# Patient Record
Sex: FEMALE | Race: WHITE | NOT HISPANIC OR LATINO | Employment: FULL TIME | URBAN - NONMETROPOLITAN AREA
[De-identification: names, ages, dates, MRNs, and addresses within clinical notes are randomized per-mention and may not be internally consistent; named-entity substitution may affect disease eponyms.]

---

## 2017-01-20 ENCOUNTER — OFFICE VISIT (OUTPATIENT)
Dept: INTERNAL MEDICINE | Facility: CLINIC | Age: 52
End: 2017-01-20

## 2017-01-20 VITALS
HEIGHT: 62 IN | BODY MASS INDEX: 31.13 KG/M2 | WEIGHT: 169.19 LBS | RESPIRATION RATE: 14 BRPM | OXYGEN SATURATION: 98 % | TEMPERATURE: 97.9 F | DIASTOLIC BLOOD PRESSURE: 70 MMHG | HEART RATE: 64 BPM | SYSTOLIC BLOOD PRESSURE: 110 MMHG

## 2017-01-20 DIAGNOSIS — E78.5 ELEVATED LIPIDS: Primary | ICD-10-CM

## 2017-01-20 PROCEDURE — 99213 OFFICE O/P EST LOW 20 MIN: CPT | Performed by: PHYSICIAN ASSISTANT

## 2017-01-20 NOTE — MR AVS SNAPSHOT
Maddi Betancur   2017 5:15 PM   Office Visit    Provider:  IVAN Muñoz   Department:  [facility] PRIMARY CARE   Dept Phone:  [phone]                Your Full Care Plan              Your Updated Medication List          This list is accurate as of: 17  5:52 PM.  Always use your most recent med list.                azelastine 0.05 % ophthalmic solution   Commonly known as:  OPTIVAR       BENADRYL 25 mg   Generic drug:  diphenhydrAMINE       buPROPion  MG 24 hr tablet   Commonly known as:  WELLBUTRIN XL   Take 1 tablet by mouth every morning.       EPIPEN 2-DONNA 0.3 MG/0.3ML solution auto-injector injection   Generic drug:  EPINEPHrine       FLONASE 50 MCG/ACT nasal spray   Generic drug:  fluticasone       PATANOL 0.1 % ophthalmic solution   Generic drug:  olopatadine       PROAIR  (90 BASE) MCG/ACT inhaler   Generic drug:  albuterol       QVAR 80 MCG/ACT inhaler   Generic drug:  beclomethasone       ZYRTEC ALLERGY 10 MG tablet   Generic drug:  cetirizine               You Were Diagnosed With        Codes Comments    Elevated lipids    -  Primary ICD-10-CM: E78.5  ICD-9-CM: 272.4       Instructions     None    Patient Instructions History      [employer] Signup     [facility] allows you to send messages to your doctor, view your test results, renew your prescriptions, schedule appointments, and more. To sign up, go to [employer] and click on the Sign Up Now link in the New User? box. Enter your [employer] Activation Code exactly as it appears below along with the last four digits of your Social Security Number and your Date of Birth () to complete the sign-up process. If you do not sign up before the expiration date, you must request a new code.    C[employer] Activation Code: [email or web address]  Expires: 2/3/2017  5:52 PM    If you have questions, you can email [email or web address] or call [phone] to talk  "to our MyChart staff. Remember, MyChart is NOT to be used for urgent needs. For medical emergencies, dial 911.               Other Info from Your Visit           Allergies     No Known Allergies      Reason for Visit     Med Refill followup, discuss cholesterol, labs?       Vital Signs     Blood Pressure Pulse Temperature Respirations Height Weight    110/70 64 97.9 °F (36.6 °C) 14 62.25\" (158.1 cm) 169 lb 3 oz (76.7 kg)    Oxygen Saturation Body Mass Index Smoking Status             98% 30.7 kg/m2 Former Smoker         Problems and Diagnoses Noted     Elevated lipids      "

## 2017-01-20 NOTE — PROGRESS NOTES
Maddi Betancur is a 51 y.o. female.     Subjective   History of Present Illness   Here today for follow up of hyperlipidemia. Has been dieting, exercising and losing weight in the last 6 months but recently donated blood and received a letter stating her total cholesterol was over 250. Last lipids in our office were elevated at 228 for total cholesterol. Has family history of hyperlipidemia in several relatives.         The following portions of the patient's history were reviewed and updated as appropriate: allergies, current medications, past family history, past medical history, past social history, past surgical history and problem list.    Review of Systems    Constitutional: Negative for appetite change, chills, fatigue, fever and unexpected weight change.   HENT: Negative for congestion, ear pain, hearing loss, nosebleeds, postnasal drip, rhinorrhea, sore throat, tinnitus and trouble swallowing.    Eyes: Negative for photophobia, discharge and visual disturbance.   Respiratory: Negative for cough, chest tightness, shortness of breath and wheezing.    Cardiovascular: Negative for chest pain, palpitations and leg swelling.   Gastrointestinal: Negative for abdominal distention, abdominal pain, blood in stool, constipation, diarrhea, nausea and vomiting.   Endocrine: Negative for cold intolerance, heat intolerance, polydipsia, polyphagia and polyuria.   Musculoskeletal: Negative for arthralgias, back pain, joint swelling, myalgias, neck pain and neck stiffness.   Skin: Negative for color change, pallor, rash and wound.   Allergic/Immunologic: Negative for environmental allergies, food allergies and immunocompromised state.   Neurological: Negative for dizziness, tremors, seizures, weakness, numbness and headaches.   Hematological: Negative for adenopathy. Does not bruise/bleed easily.   Psychiatric/Behavioral: Negative for agitation, behavioral problems, confusion, hallucinations, self-injury and suicidal  "ideas. The patient is not nervous/anxious.      Objective    Physical Exam  Constitutional: Oriented to person, place, and time. Appears well-developed and well-nourished.   HENT:   Head: Normocephalic and atraumatic.   Eyes: EOM are normal. Pupils are equal, round, and reactive to light.   Neck: Normal range of motion. Neck supple.   Cardiovascular: Normal rate, regular rhythm and normal heart sounds.    Pulmonary/Chest: Effort normal and breath sounds normal. No respiratory distress.  Has no wheezes or rales. Exhibits no chest wall tenderness.   Abdominal: Soft. Bowel sounds are normal. Exhibits no distension and no mass. There is no tenderness.   Musculoskeletal: Normal range of motion. Exhibits no tenderness.   Neurological: Alert and oriented to person, place, and time.   Skin: Skin is warm and dry.   Psychiatric: Has a normal mood and affect. Behavior is normal. Judgment and thought content normal.       Visit Vitals   • /70   • Pulse 64   • Temp 97.9 °F (36.6 °C)   • Resp 14   • Ht 62.25\" (158.1 cm)   • Wt 169 lb 3 oz (76.7 kg)   • SpO2 98%   • BMI 30.7 kg/m2       Nursing note and vitals reviewed.        Assessment/Plan   Maddi was seen today for med refill.    Diagnoses and all orders for this visit:    Elevated lipids  -     Lipid Panel; Future    Will evaluate after fasting lipids reviewed. Continue diet, exercise and weight loss.            "

## 2017-01-24 ENCOUNTER — LAB (OUTPATIENT)
Dept: INTERNAL MEDICINE | Facility: CLINIC | Age: 52
End: 2017-01-24

## 2017-01-24 DIAGNOSIS — E78.5 ELEVATED LIPIDS: ICD-10-CM

## 2017-01-24 LAB
ARTICHOKE IGE QN: 155 MG/DL (ref 0–130)
CHOLEST SERPL-MCNC: 238 MG/DL (ref 0–200)
HDLC SERPL-MCNC: 56 MG/DL (ref 40–60)
TRIGL SERPL-MCNC: 134 MG/DL (ref 0–150)

## 2017-01-24 PROCEDURE — 80061 LIPID PANEL: CPT | Performed by: PHYSICIAN ASSISTANT

## 2017-01-24 PROCEDURE — 36415 COLL VENOUS BLD VENIPUNCTURE: CPT | Performed by: INTERNAL MEDICINE

## 2017-01-26 ENCOUNTER — TELEPHONE (OUTPATIENT)
Dept: INTERNAL MEDICINE | Facility: CLINIC | Age: 52
End: 2017-01-26

## 2017-01-27 RX ORDER — ROSUVASTATIN CALCIUM 10 MG/1
10 TABLET, COATED ORAL NIGHTLY
Qty: 30 TABLET | Refills: 5 | Status: SHIPPED | OUTPATIENT
Start: 2017-01-27 | End: 2017-01-27 | Stop reason: SDUPTHER

## 2017-01-27 RX ORDER — ROSUVASTATIN CALCIUM 10 MG/1
10 TABLET, COATED ORAL NIGHTLY
Qty: 90 TABLET | Refills: 1 | Status: SHIPPED | OUTPATIENT
Start: 2017-01-27 | End: 2017-08-07 | Stop reason: SDUPTHER

## 2017-03-03 RX ORDER — BUPROPION HYDROCHLORIDE 300 MG/1
TABLET ORAL
Qty: 90 TABLET | Refills: 3 | Status: SHIPPED | OUTPATIENT
Start: 2017-03-03 | End: 2018-02-20 | Stop reason: SDUPTHER

## 2017-03-24 ENCOUNTER — TELEPHONE (OUTPATIENT)
Dept: INTERNAL MEDICINE | Facility: CLINIC | Age: 52
End: 2017-03-24

## 2017-03-24 NOTE — TELEPHONE ENCOUNTER
----- Message from Ivett Lozano sent at 3/24/2017 11:05 AM EDT -----  Contact: PAtient   Patient called the office stating that she was needing to leave more information about what she was wanting to discuss with Leonor. She said that it is regarding her cholesterol medication, She said that she thinks that's he is having side effects from it and would like a call back when you get a chance.

## 2017-03-28 NOTE — TELEPHONE ENCOUNTER
Pt said she was not sure if the reactions are from crestor or allergy shots, she gets hot spots on her skin in different places, they itch and will go away, last week she got a 3 inch x 6 inch bruise on leg, it also itched, went away in a wk, she also went to give blood and her total chol was 152, told her we would call her Thursday and if she wanted to d/c med to see if this stops she can or wait for our call, please call her back at [phone]

## 2017-03-30 NOTE — TELEPHONE ENCOUNTER
Cholesterol probably did not truly drop that much and would be more accurate to check a cholesterol panel in a few months. If she is more comfortable taking Crestor every other night that would be fine too.     The bruise was most likely from scratching at one of the skin reactions. Not a side effect of Crestor and not of any concern unless she notices frequent easy bruising.

## 2017-03-30 NOTE — TELEPHONE ENCOUNTER
I doubt that Crestor would be causing the skin issues and more likely would be the allergy shots but if she wants to discontinue the Crestor for 1-2 weeks to see then that would be fine.

## 2017-05-19 ENCOUNTER — OFFICE VISIT (OUTPATIENT)
Dept: INTERNAL MEDICINE | Facility: CLINIC | Age: 52
End: 2017-05-19

## 2017-05-19 VITALS
TEMPERATURE: 98.3 F | OXYGEN SATURATION: 99 % | HEART RATE: 82 BPM | SYSTOLIC BLOOD PRESSURE: 124 MMHG | WEIGHT: 164.2 LBS | BODY MASS INDEX: 30.22 KG/M2 | HEIGHT: 62 IN | DIASTOLIC BLOOD PRESSURE: 78 MMHG

## 2017-05-19 DIAGNOSIS — E78.00 PURE HYPERCHOLESTEROLEMIA: Primary | ICD-10-CM

## 2017-05-19 DIAGNOSIS — R10.9 RIGHT FLANK PAIN: Primary | ICD-10-CM

## 2017-05-19 LAB
BILIRUB BLD-MCNC: NEGATIVE MG/DL
CLARITY, POC: CLEAR
COLOR UR: YELLOW
GLUCOSE UR STRIP-MCNC: NEGATIVE MG/DL
KETONES UR QL: NEGATIVE
LEUKOCYTE EST, POC: NEGATIVE
NITRITE UR-MCNC: NEGATIVE MG/ML
PH UR: 6.5 [PH] (ref 5–8)
PROT UR STRIP-MCNC: NEGATIVE MG/DL
RBC # UR STRIP: NEGATIVE /UL
SP GR UR: 1 (ref 1–1.03)
UROBILINOGEN UR QL: NORMAL

## 2017-05-19 PROCEDURE — 81003 URINALYSIS AUTO W/O SCOPE: CPT | Performed by: PHYSICIAN ASSISTANT

## 2017-05-19 PROCEDURE — 99213 OFFICE O/P EST LOW 20 MIN: CPT | Performed by: PHYSICIAN ASSISTANT

## 2017-05-19 RX ORDER — IBUPROFEN 800 MG/1
800 TABLET ORAL EVERY 8 HOURS PRN
Qty: 60 TABLET | Refills: 0 | Status: SHIPPED | OUTPATIENT
Start: 2017-05-19 | End: 2017-10-12

## 2017-05-19 RX ORDER — CYCLOBENZAPRINE HCL 5 MG
5 TABLET ORAL 3 TIMES DAILY PRN
Qty: 15 TABLET | Refills: 0 | Status: SHIPPED | OUTPATIENT
Start: 2017-05-19 | End: 2017-10-12

## 2017-05-22 ENCOUNTER — TELEPHONE (OUTPATIENT)
Dept: INTERNAL MEDICINE | Facility: CLINIC | Age: 52
End: 2017-05-22

## 2017-07-03 ENCOUNTER — RESULTS ENCOUNTER (OUTPATIENT)
Dept: INTERNAL MEDICINE | Facility: CLINIC | Age: 52
End: 2017-07-03

## 2017-07-03 DIAGNOSIS — E78.00 PURE HYPERCHOLESTEROLEMIA: ICD-10-CM

## 2017-07-25 LAB
CHOLEST SERPL-MCNC: 122 MG/DL (ref 0–199)
HDLC SERPL-MCNC: 50 MG/DL (ref 40–60)
LDLC SERPL CALC-MCNC: 58 MG/DL (ref 0–99)
TRIGL SERPL-MCNC: 69 MG/DL
VLDLC SERPL CALC-MCNC: 13.8 MG/DL

## 2017-08-07 RX ORDER — ROSUVASTATIN CALCIUM 10 MG/1
TABLET, COATED ORAL
Qty: 90 TABLET | Refills: 1 | Status: SHIPPED | OUTPATIENT
Start: 2017-08-07 | End: 2018-02-03 | Stop reason: SDUPTHER

## 2017-10-12 ENCOUNTER — OFFICE VISIT (OUTPATIENT)
Dept: OBSTETRICS AND GYNECOLOGY | Facility: CLINIC | Age: 52
End: 2017-10-12

## 2017-10-12 VITALS
DIASTOLIC BLOOD PRESSURE: 67 MMHG | BODY MASS INDEX: 28.34 KG/M2 | SYSTOLIC BLOOD PRESSURE: 116 MMHG | WEIGHT: 154 LBS | HEIGHT: 62 IN

## 2017-10-12 DIAGNOSIS — N89.8 VAGINAL IRRITATION: Primary | ICD-10-CM

## 2017-10-12 DIAGNOSIS — N95.2 ATROPHIC VAGINITIS: ICD-10-CM

## 2017-10-12 PROCEDURE — 99214 OFFICE O/P EST MOD 30 MIN: CPT | Performed by: PHYSICIAN ASSISTANT

## 2017-10-12 RX ORDER — INFLUENZA VIRUS VACCINE 15; 15; 15; 15 UG/.5ML; UG/.5ML; UG/.5ML; UG/.5ML
SUSPENSION INTRAMUSCULAR
Refills: 0 | COMMUNITY
Start: 2017-10-02 | End: 2017-10-12

## 2017-10-12 NOTE — PROGRESS NOTES
Subjective   Chief Complaint   Patient presents with   • Vaginitis       Maddi Betancur is a 52 y.o. year old  presenting to be seen for complaints of vaginal itching, burning,  and irritation that has been off and on for 6 months now  She has assumed these were yeast infections and has been treated for such-has had diflucan and monistat cream 4 times in past 6 months with persistent symptoms  Has vaginal dyspareunia  She had hysterectomy several years ago-she has ovaries  Has been several years since last pap        Past Medical History:   Diagnosis Date   • Acid reflux    • Anemia    • Asthma    • BMI 34.0-34.9,adult    • Cancer     Cervical   • Depression         Current Outpatient Prescriptions:   •  albuterol (PROAIR HFA) 108 (90 BASE) MCG/ACT inhaler, ProAir  (90 Base) MCG/ACT Inhalation Aerosol Solution; Patient Sig: ProAir  (90 Base) MCG/ACT Inhalation Aerosol Solution INHALE 1 TO 2 PUFFS EVERY 4 TO 6 HOURS AS NEEDED.; 1; 2; -May-2013; Active, Disp: , Rfl:   •  azelastine (OPTIVAR) 0.05 % ophthalmic solution, Apply  to eye every 12 (twelve) hours., Disp: , Rfl:   •  beclomethasone (QVAR) 80 MCG/ACT inhaler, Qvar 80 MCG/ACT Inhalation Aerosol Solution; Patient Sig: Qvar 80 MCG/ACT Inhalation Aerosol Solution ; 0; -2014; Active, Disp: , Rfl:   •  buPROPion XL (WELLBUTRIN XL) 300 MG 24 hr tablet, TAKE 1 TABLET EVERY MORNING **PAR MFG **, Disp: 90 tablet, Rfl: 3  •  cetirizine (ZYRTEC ALLERGY) 10 MG tablet, Take  by mouth., Disp: , Rfl:   •  diphenhydrAMINE (BENADRYL) 25 mg, Take 25 mg by mouth Every Night., Disp: , Rfl:   •  EPIPEN 2-DONNA 0.3 MG/0.3ML solution auto-injector injection, use as directed for ALLERGIC REACTION, Disp: , Rfl: 0  •  rosuvastatin (CRESTOR) 10 MG tablet, TAKE 1 TABLET NIGHTLY, Disp: 90 tablet, Rfl: 1  •  conjugated estrogens (PREMARIN) 0.625 MG/GM vaginal cream, Use 0.5  grams intravaginally 2 times per week to control symptoms., Disp: 30 g, Rfl: 0  •   "conjugated estrogens (PREMARIN) 0.625 MG/GM vaginal cream, Use 0.5  grams intravaginally 2 times per week to control symptoms., Disp: 30 g, Rfl: 3   No Known Allergies   Past Surgical History:   Procedure Laterality Date   • DIAGNOSTIC LAPAROSCOPY     • EXCISION URETHRAL DIVERTICULUM FEMALE     • GANGLION CYST EXCISION     • HYSTERECTOMY     • PARTIAL HYSTERECTOMY        Social History     Social History   • Marital status:      Spouse name: N/A   • Number of children: N/A   • Years of education: N/A     Occupational History   • Not on file.     Social History Main Topics   • Smoking status: Former Smoker   • Smokeless tobacco: Never Used   • Alcohol use No   • Drug use: No   • Sexual activity: Yes     Birth control/ protection: Surgical      Comment: Hysterectomy     Other Topics Concern   • Not on file     Social History Narrative      Family History   Problem Relation Age of Onset   • Lung cancer Mother    • Cervical cancer Mother    • Lymphoma Mother    • Hyperlipidemia Mother    • Hypertension Mother    • Breast cancer Maternal Aunt        Review of Systems   Constitutional: Negative.    Gastrointestinal: Negative.    Genitourinary: Positive for dyspareunia and vaginal pain. Negative for pelvic pain and vaginal discharge.   Musculoskeletal: Positive for arthralgias.   All other systems reviewed and are negative.          Objective   /67  Ht 62\" (157.5 cm)  Wt 154 lb (69.9 kg)  BMI 28.17 kg/m2    Physical Exam   Constitutional: She appears well-developed and well-nourished. She is cooperative.   Eyes: Conjunctivae and lids are normal.   Neck: No thyroid mass and no thyromegaly present.   Cardiovascular: Normal rate, regular rhythm and normal heart sounds.    Pulmonary/Chest: Effort normal and breath sounds normal.   Abdominal: Soft. Normal appearance. There is no hepatosplenomegaly. There is no tenderness.   Genitourinary: There is no tenderness or lesion on the right labia. There is no " tenderness or lesion on the left labia. Right adnexum displays no mass and no tenderness. Left adnexum displays no mass and no tenderness. No erythema or tenderness in the vagina. No vaginal discharge found.   Genitourinary Comments: Tissue atrophic  Small amt white exudate appears residual monistat cream   Neurological: She is alert.   Skin: Skin is warm, dry and intact.   Psychiatric: She has a normal mood and affect. Her behavior is normal.            Assessment and Plan  Maddi was seen today for vaginitis.    Diagnoses and all orders for this visit:    Vaginal irritation  -     Candida panel, PCR - Swab, Vagina    Atrophic vaginitis    Other orders  -     conjugated estrogens (PREMARIN) 0.625 MG/GM vaginal cream; Use 0.5  grams intravaginally 2 times per week to control symptoms.  -     conjugated estrogens (PREMARIN) 0.625 MG/GM vaginal cream; Use 0.5  grams intravaginally 2 times per week to control symptoms.      Patient Instructions   Suspect symptoms from atrophy  Vaginal culture done for yeast and will call with results             This note was electronically signed.    Ilsa Campo PA-C   October 12, 2017

## 2017-10-19 LAB
C ALBICANS DNA VAG QL NAA+PROBE: NEGATIVE
C GLABRATA DNA VAG QL NAA+PROBE: NEGATIVE
C KRUSEI DNA VAG QL NAA+PROBE: NEGATIVE
C LUSITANIAE DNA VAG QL NAA+PROBE: NEGATIVE
C PARAP DNA VAG QL NAA+PROBE: NEGATIVE
C TROPICLS DNA VAG QL NAA+PROBE: NEGATIVE

## 2017-12-15 ENCOUNTER — OFFICE VISIT (OUTPATIENT)
Dept: INTERNAL MEDICINE | Facility: CLINIC | Age: 52
End: 2017-12-15

## 2017-12-15 VITALS
TEMPERATURE: 98.4 F | HEIGHT: 62 IN | SYSTOLIC BLOOD PRESSURE: 128 MMHG | DIASTOLIC BLOOD PRESSURE: 72 MMHG | BODY MASS INDEX: 27.79 KG/M2 | WEIGHT: 151 LBS | HEART RATE: 60 BPM

## 2017-12-15 DIAGNOSIS — E78.2 MIXED HYPERLIPIDEMIA: Primary | ICD-10-CM

## 2017-12-15 LAB
ALBUMIN SERPL-MCNC: 4.5 G/DL (ref 3.5–5)
ALBUMIN/GLOB SERPL: 1.8 G/DL (ref 1–2)
ALP SERPL-CCNC: 86 U/L (ref 38–126)
ALT SERPL-CCNC: 43 U/L (ref 13–69)
AST SERPL-CCNC: 47 U/L (ref 15–46)
BILIRUB SERPL-MCNC: 0.6 MG/DL (ref 0.2–1.3)
BUN SERPL-MCNC: 10 MG/DL (ref 7–20)
BUN/CREAT SERPL: 14.3 (ref 7.1–23.5)
CALCIUM SERPL-MCNC: 9.9 MG/DL (ref 8.4–10.2)
CHLORIDE SERPL-SCNC: 105 MMOL/L (ref 98–107)
CHOLEST SERPL-MCNC: 129 MG/DL (ref 0–199)
CO2 SERPL-SCNC: 25 MMOL/L (ref 26–30)
CREAT SERPL-MCNC: 0.7 MG/DL (ref 0.6–1.3)
GFR SERPLBLD CREATININE-BSD FMLA CKD-EPI: 106 ML/MIN/1.73
GFR SERPLBLD CREATININE-BSD FMLA CKD-EPI: 88 ML/MIN/1.73
GLOBULIN SER CALC-MCNC: 2.5 GM/DL
GLUCOSE SERPL-MCNC: 77 MG/DL (ref 74–98)
HDLC SERPL-MCNC: 61 MG/DL (ref 40–60)
LDLC SERPL CALC-MCNC: 56 MG/DL (ref 0–99)
POTASSIUM SERPL-SCNC: 4.2 MMOL/L (ref 3.5–5.1)
PROT SERPL-MCNC: 7 G/DL (ref 6.3–8.2)
SODIUM SERPL-SCNC: 142 MMOL/L (ref 137–145)
TRIGL SERPL-MCNC: 62 MG/DL
VLDLC SERPL CALC-MCNC: 12.4 MG/DL

## 2017-12-15 PROCEDURE — 99213 OFFICE O/P EST LOW 20 MIN: CPT | Performed by: PHYSICIAN ASSISTANT

## 2017-12-15 NOTE — PROGRESS NOTES
Maddi Betancur is a 52 y.o. female.     Subjective   History of Present Illness   Here today with concern of elevated cholesterol when checked while donating blood. She has been taking Crestor regularly as directed and wants to ensure it is working well. She follows a strict diet low in cholesterol and exercises most days of the week.       The following portions of the patient's history were reviewed and updated as appropriate: allergies, current medications, past family history, past medical history, past social history, past surgical history and problem list.    Review of Systems    Constitutional: Negative for appetite change, chills, fatigue, fever and unexpected weight change.   HENT: Negative for congestion, ear pain, hearing loss, nosebleeds, postnasal drip, rhinorrhea, sore throat, tinnitus and trouble swallowing.    Eyes: Negative for photophobia, discharge and visual disturbance.   Respiratory: Negative for cough, chest tightness, shortness of breath and wheezing.    Cardiovascular: Negative for chest pain, palpitations and leg swelling.   Gastrointestinal: Negative for abdominal distention, abdominal pain, blood in stool, constipation, diarrhea, nausea and vomiting.   Endocrine: Negative for cold intolerance, heat intolerance, polydipsia, polyphagia and polyuria.   Musculoskeletal: Negative for arthralgias, back pain, joint swelling, myalgias, neck pain and neck stiffness.   Skin: Negative for color change, pallor, rash and wound.   Allergic/Immunologic: Negative for environmental allergies, food allergies and immunocompromised state.   Neurological: Negative for dizziness, tremors, seizures, weakness, numbness and headaches.   Hematological: Negative for adenopathy. Does not bruise/bleed easily.   Psychiatric/Behavioral: Negative for sleep disturbances, agitation, behavioral problems, confusion, hallucinations, self-injury and suicidal ideas. The patient is not nervous/anxious.   "    Objective    Physical Exam  Constitutional: Oriented to person, place, and time. Appears well-developed and well-nourished.   HENT:   Head: Normocephalic and atraumatic.   Eyes: EOM are normal. Pupils are equal, round, and reactive to light.   Neck: Normal range of motion. Neck supple.   Cardiovascular: Normal rate, regular rhythm and normal heart sounds.    Pulmonary/Chest: Effort normal and breath sounds normal. No respiratory distress.  Has no wheezes or rales. Exhibits no chest wall tenderness.   Abdominal: Soft. Bowel sounds are normal. Exhibits no distension and no mass. There is no tenderness.   Musculoskeletal: Normal range of motion. Exhibits no tenderness.   Neurological: Alert and oriented to person, place, and time.   Skin: Skin is warm and dry.   Psychiatric: Has a normal mood and affect. Behavior is normal. Judgment and thought content normal.       /72  Pulse 60  Temp 98.4 °F (36.9 °C)  Ht 157.5 cm (62.01\")  Wt 68.5 kg (151 lb)  BMI 27.61 kg/m2    Nursing note and vitals reviewed.        Assessment/Plan   Maddi was seen today for follow-up and hyperlipidemia.    Diagnoses and all orders for this visit:    Mixed hyperlipidemia  -     Lipid Panel  -     Comprehensive Metabolic Panel        PAP scheduled for next month. She declined order for mammogram.   She declined an order for colonoscopy today but will consider in the future.        "

## 2018-01-17 ENCOUNTER — OFFICE VISIT (OUTPATIENT)
Dept: OBSTETRICS AND GYNECOLOGY | Facility: CLINIC | Age: 53
End: 2018-01-17

## 2018-01-17 VITALS
SYSTOLIC BLOOD PRESSURE: 122 MMHG | DIASTOLIC BLOOD PRESSURE: 70 MMHG | HEIGHT: 62 IN | WEIGHT: 152 LBS | BODY MASS INDEX: 27.97 KG/M2

## 2018-01-17 DIAGNOSIS — Z12.72 SPECIAL SCREENING FOR MALIGNANT NEOPLASM OF VAGINA: ICD-10-CM

## 2018-01-17 DIAGNOSIS — Z01.419 ENCOUNTER FOR GYNECOLOGICAL EXAMINATION WITHOUT ABNORMAL FINDING: Primary | ICD-10-CM

## 2018-01-17 PROCEDURE — 99396 PREV VISIT EST AGE 40-64: CPT | Performed by: PHYSICIAN ASSISTANT

## 2018-01-17 NOTE — PROGRESS NOTES
Subjective   Chief Complaint   Patient presents with   • Gynecologic Exam     No complaints       Maddi Betancur is a 52 y.o. year old  presenting to be seen for her annual gynecological exam.   She has no complaints or concerns  Using vaginal estrogen cream about once q 2 weeks and working OK this way-does not need refills  Patient has never had mammogram and refuses mammogram- she does do SBE monthly  Has had hysterectomy-has ovaries    Past Medical History:   Diagnosis Date   • Acid reflux    • Anemia    • Asthma    • BMI 34.0-34.9,adult    • Cancer     Cervical   • Depression         Current Outpatient Prescriptions:   •  albuterol (PROAIR HFA) 108 (90 BASE) MCG/ACT inhaler, ProAir  (90 Base) MCG/ACT Inhalation Aerosol Solution; Patient Sig: ProAir  (90 Base) MCG/ACT Inhalation Aerosol Solution INHALE 1 TO 2 PUFFS EVERY 4 TO 6 HOURS AS NEEDED.; 1; 2; 22-May-2013; Active, Disp: , Rfl:   •  azelastine (OPTIVAR) 0.05 % ophthalmic solution, Apply  to eye every 12 (twelve) hours., Disp: , Rfl:   •  beclomethasone (QVAR) 80 MCG/ACT inhaler, Qvar 80 MCG/ACT Inhalation Aerosol Solution; Patient Sig: Qvar 80 MCG/ACT Inhalation Aerosol Solution ; 0; -2014; Active, Disp: , Rfl:   •  buPROPion XL (WELLBUTRIN XL) 300 MG 24 hr tablet, TAKE 1 TABLET EVERY MORNING **PAR MFG **, Disp: 90 tablet, Rfl: 3  •  cetirizine (ZYRTEC ALLERGY) 10 MG tablet, Take  by mouth., Disp: , Rfl:   •  conjugated estrogens (PREMARIN) 0.625 MG/GM vaginal cream, Use 0.5  grams intravaginally 2 times per week to control symptoms., Disp: 30 g, Rfl: 0  •  diphenhydrAMINE (BENADRYL) 25 mg, Take 25 mg by mouth Every Night., Disp: , Rfl:   •  EPIPEN 2-DONNA 0.3 MG/0.3ML solution auto-injector injection, use as directed for ALLERGIC REACTION, Disp: , Rfl: 0  •  rosuvastatin (CRESTOR) 10 MG tablet, TAKE 1 TABLET NIGHTLY, Disp: 90 tablet, Rfl: 1   No Known Allergies   Past Surgical History:   Procedure Laterality Date   • DIAGNOSTIC  "LAPAROSCOPY     • EXCISION URETHRAL DIVERTICULUM FEMALE     • GANGLION CYST EXCISION     • HYSTERECTOMY     • PARTIAL HYSTERECTOMY        Social History     Social History   • Marital status:      Spouse name: N/A   • Number of children: N/A   • Years of education: N/A     Occupational History   • Not on file.     Social History Main Topics   • Smoking status: Former Smoker   • Smokeless tobacco: Never Used   • Alcohol use No   • Drug use: No   • Sexual activity: Yes     Birth control/ protection: Surgical      Comment: Hysterectomy     Other Topics Concern   • Not on file     Social History Narrative      Family History   Problem Relation Age of Onset   • Lung cancer Mother    • Cervical cancer Mother    • Lymphoma Mother    • Hyperlipidemia Mother    • Hypertension Mother    • Breast cancer Maternal Aunt        Review of Systems   Constitutional: Negative.    Gastrointestinal: Negative.    Genitourinary: Negative.            Objective   /70  Ht 157.5 cm (62\")  Wt 68.9 kg (152 lb)  Breastfeeding? No  BMI 27.8 kg/m2    Physical Exam   Constitutional: She appears well-developed and well-nourished. She is cooperative.   Pulmonary/Chest: Right breast exhibits no inverted nipple, no mass, no nipple discharge, no skin change and no tenderness. Left breast exhibits no inverted nipple, no mass, no nipple discharge, no skin change and no tenderness.   Abdominal: Soft. Normal appearance. There is no hepatosplenomegaly. There is no tenderness.   Genitourinary: Vagina normal. There is no tenderness or lesion on the right labia. There is no tenderness or lesion on the left labia. Right adnexum displays no mass and no tenderness. Left adnexum displays no mass and no tenderness.   Neurological: She is alert.   Skin: Skin is warm and dry.   Psychiatric: She has a normal mood and affect. Her behavior is normal.            Assessment and Plan  Maddi was seen today for gynecologic exam.    Diagnoses and all orders " for this visit:    Encounter for gynecological examination without abnormal finding    Special screening for malignant neoplasm of vagina  -     Liquid-based Pap Smear, Screening - ThinPrep Vial, Cervix; Future    Patient Instructions   Self breast exam monthly  Annual mammogram  Calcium 1200 mg daily and vit D 2000 mg daily  Regular excercise             This note was electronically signed.    Ilsa Campo PA-C   January 17, 2018

## 2018-01-25 DIAGNOSIS — Z12.72 SPECIAL SCREENING FOR MALIGNANT NEOPLASM OF VAGINA: ICD-10-CM

## 2018-02-05 RX ORDER — ROSUVASTATIN CALCIUM 10 MG/1
TABLET, COATED ORAL
Qty: 90 TABLET | Refills: 1 | Status: SHIPPED | OUTPATIENT
Start: 2018-02-05 | End: 2018-08-02 | Stop reason: SDUPTHER

## 2018-02-20 RX ORDER — BUPROPION HYDROCHLORIDE 300 MG/1
TABLET ORAL
Qty: 90 TABLET | Refills: 3 | Status: SHIPPED | OUTPATIENT
Start: 2018-02-20 | End: 2019-02-14 | Stop reason: SDUPTHER

## 2018-03-15 ENCOUNTER — TRANSCRIBE ORDERS (OUTPATIENT)
Dept: ADMINISTRATIVE | Facility: HOSPITAL | Age: 53
End: 2018-03-15

## 2018-03-15 DIAGNOSIS — M25.571 CHRONIC PAIN OF RIGHT ANKLE: Primary | ICD-10-CM

## 2018-03-15 DIAGNOSIS — G89.29 CHRONIC PAIN OF RIGHT ANKLE: Primary | ICD-10-CM

## 2018-03-20 ENCOUNTER — HOSPITAL ENCOUNTER (OUTPATIENT)
Dept: ULTRASOUND IMAGING | Facility: HOSPITAL | Age: 53
Discharge: HOME | End: 2018-03-20
Attending: ORTHOPAEDIC SURGERY | Admitting: ORTHOPAEDIC SURGERY

## 2018-03-20 DIAGNOSIS — G89.29 CHRONIC PAIN OF RIGHT ANKLE: ICD-10-CM

## 2018-03-20 DIAGNOSIS — M25.571 CHRONIC PAIN OF RIGHT ANKLE: ICD-10-CM

## 2018-03-20 PROCEDURE — 93971 EXTREMITY STUDY: CPT

## 2018-08-02 RX ORDER — ROSUVASTATIN CALCIUM 10 MG/1
TABLET, COATED ORAL
Qty: 90 TABLET | Refills: 1 | Status: SHIPPED | OUTPATIENT
Start: 2018-08-02 | End: 2019-01-20 | Stop reason: SDUPTHER

## 2018-10-29 ENCOUNTER — TELEPHONE (OUTPATIENT)
Dept: INTERNAL MEDICINE | Facility: CLINIC | Age: 53
End: 2018-10-29

## 2018-11-01 ENCOUNTER — OFFICE VISIT (OUTPATIENT)
Dept: INTERNAL MEDICINE | Facility: CLINIC | Age: 53
End: 2018-11-01

## 2018-11-01 VITALS
TEMPERATURE: 98.8 F | BODY MASS INDEX: 27.79 KG/M2 | HEART RATE: 70 BPM | WEIGHT: 151 LBS | HEIGHT: 62 IN | SYSTOLIC BLOOD PRESSURE: 112 MMHG | OXYGEN SATURATION: 100 % | DIASTOLIC BLOOD PRESSURE: 68 MMHG

## 2018-11-01 DIAGNOSIS — E78.2 MIXED HYPERLIPIDEMIA: ICD-10-CM

## 2018-11-01 DIAGNOSIS — Z23 NEED FOR INFLUENZA VACCINATION: ICD-10-CM

## 2018-11-01 DIAGNOSIS — M21.612 BUNION OF GREAT TOE OF LEFT FOOT: ICD-10-CM

## 2018-11-01 DIAGNOSIS — Z01.818 PREOP EXAMINATION: Primary | ICD-10-CM

## 2018-11-01 LAB
ALBUMIN SERPL-MCNC: 4.4 G/DL (ref 3.5–5)
ALBUMIN/GLOB SERPL: 1.8 G/DL (ref 1–2)
ALP SERPL-CCNC: 73 U/L (ref 38–126)
ALT SERPL-CCNC: 30 U/L (ref 13–69)
AST SERPL-CCNC: 26 U/L (ref 15–46)
BASOPHILS # BLD AUTO: 0.04 10*3/MM3 (ref 0–0.2)
BASOPHILS NFR BLD AUTO: 0.8 % (ref 0–2.5)
BILIRUB SERPL-MCNC: 0.5 MG/DL (ref 0.2–1.3)
BUN SERPL-MCNC: 9 MG/DL (ref 7–20)
BUN/CREAT SERPL: 12.9 (ref 7.1–23.5)
CALCIUM SERPL-MCNC: 9.6 MG/DL (ref 8.4–10.2)
CHLORIDE SERPL-SCNC: 103 MMOL/L (ref 98–107)
CHOLEST SERPL-MCNC: 132 MG/DL (ref 0–199)
CO2 SERPL-SCNC: 27 MMOL/L (ref 26–30)
CREAT SERPL-MCNC: 0.7 MG/DL (ref 0.6–1.3)
EOSINOPHIL # BLD AUTO: 0.1 10*3/MM3 (ref 0–0.7)
EOSINOPHIL NFR BLD AUTO: 1.9 % (ref 0–7)
ERYTHROCYTE [DISTWIDTH] IN BLOOD BY AUTOMATED COUNT: 14.8 % (ref 11.5–14.5)
GLOBULIN SER CALC-MCNC: 2.5 GM/DL
GLUCOSE SERPL-MCNC: 83 MG/DL (ref 74–98)
HCT VFR BLD AUTO: 34.5 % (ref 37–47)
HDLC SERPL-MCNC: 69 MG/DL (ref 40–60)
HGB BLD-MCNC: 10.8 G/DL (ref 12–16)
IMM GRANULOCYTES # BLD: 0.01 10*3/MM3 (ref 0–0.06)
IMM GRANULOCYTES NFR BLD: 0.2 % (ref 0–0.6)
LDLC SERPL CALC-MCNC: 49 MG/DL (ref 0–99)
LYMPHOCYTES # BLD AUTO: 1.63 10*3/MM3 (ref 0.6–3.4)
LYMPHOCYTES NFR BLD AUTO: 31.2 % (ref 10–50)
MCH RBC QN AUTO: 26.3 PG (ref 27–31)
MCHC RBC AUTO-ENTMCNC: 31.3 G/DL (ref 30–37)
MCV RBC AUTO: 83.9 FL (ref 81–99)
MONOCYTES # BLD AUTO: 0.42 10*3/MM3 (ref 0–0.9)
MONOCYTES NFR BLD AUTO: 8 % (ref 0–12)
NEUTROPHILS # BLD AUTO: 3.03 10*3/MM3 (ref 2–6.9)
NEUTROPHILS NFR BLD AUTO: 57.9 % (ref 37–80)
NRBC BLD AUTO-RTO: 0 /100 WBC (ref 0–0)
PLATELET # BLD AUTO: 196 10*3/MM3 (ref 130–400)
POTASSIUM SERPL-SCNC: 4.7 MMOL/L (ref 3.5–5.1)
PROT SERPL-MCNC: 6.9 G/DL (ref 6.3–8.2)
RBC # BLD AUTO: 4.11 10*6/MM3 (ref 4.2–5.4)
SODIUM SERPL-SCNC: 138 MMOL/L (ref 137–145)
TRIGL SERPL-MCNC: 71 MG/DL
VLDLC SERPL CALC-MCNC: 14.2 MG/DL
WBC # BLD AUTO: 5.23 10*3/MM3 (ref 4.8–10.8)

## 2018-11-01 PROCEDURE — 90674 CCIIV4 VAC NO PRSV 0.5 ML IM: CPT | Performed by: PHYSICIAN ASSISTANT

## 2018-11-01 PROCEDURE — 90471 IMMUNIZATION ADMIN: CPT | Performed by: PHYSICIAN ASSISTANT

## 2018-11-01 PROCEDURE — 99214 OFFICE O/P EST MOD 30 MIN: CPT | Performed by: PHYSICIAN ASSISTANT

## 2019-01-21 RX ORDER — ROSUVASTATIN CALCIUM 10 MG/1
TABLET, COATED ORAL
Qty: 90 TABLET | Refills: 1 | Status: SHIPPED | OUTPATIENT
Start: 2019-01-21 | End: 2019-07-18 | Stop reason: SDUPTHER

## 2019-02-14 RX ORDER — BUPROPION HYDROCHLORIDE 300 MG/1
TABLET ORAL
Qty: 90 TABLET | Refills: 3 | Status: SHIPPED | OUTPATIENT
Start: 2019-02-14 | End: 2020-01-30 | Stop reason: SDUPTHER

## 2019-05-07 ENCOUNTER — OFFICE VISIT (OUTPATIENT)
Dept: INTERNAL MEDICINE | Facility: CLINIC | Age: 54
End: 2019-05-07

## 2019-05-07 ENCOUNTER — HOSPITAL ENCOUNTER (OUTPATIENT)
Dept: GENERAL RADIOLOGY | Facility: HOSPITAL | Age: 54
Discharge: HOME | End: 2019-05-07
Admitting: PHYSICIAN ASSISTANT

## 2019-05-07 VITALS
DIASTOLIC BLOOD PRESSURE: 62 MMHG | SYSTOLIC BLOOD PRESSURE: 118 MMHG | OXYGEN SATURATION: 99 % | HEART RATE: 70 BPM | TEMPERATURE: 99.2 F

## 2019-05-07 DIAGNOSIS — Q76.0 SPINA BIFIDA OCCULTA: ICD-10-CM

## 2019-05-07 DIAGNOSIS — M53.87 SCIATICA ASSOCIATED WITH DISORDER OF LUMBOSACRAL SPINE: ICD-10-CM

## 2019-05-07 DIAGNOSIS — M54.50 ACUTE EXACERBATION OF CHRONIC LOW BACK PAIN: Primary | ICD-10-CM

## 2019-05-07 DIAGNOSIS — G89.29 ACUTE EXACERBATION OF CHRONIC LOW BACK PAIN: Primary | ICD-10-CM

## 2019-05-07 PROCEDURE — 99213 OFFICE O/P EST LOW 20 MIN: CPT | Performed by: PHYSICIAN ASSISTANT

## 2019-05-07 PROCEDURE — 72110 X-RAY EXAM L-2 SPINE 4/>VWS: CPT

## 2019-05-07 RX ORDER — CYCLOBENZAPRINE HCL 10 MG
10 TABLET ORAL NIGHTLY PRN
Qty: 30 TABLET | Refills: 0 | Status: SHIPPED | OUTPATIENT
Start: 2019-05-07 | End: 2020-01-30

## 2019-05-07 NOTE — PROGRESS NOTES
Maddi Betancur is a 53 y.o. female.     Subjective   History of Present Illness   Here today with complaint of acute on chronic right sided sciatica for the last 4 days which has seemed to worsen each day since onset. She typically only experiences symptoms for up to 1 day at most but this time it has been persistent for 4 days.  No known injury.  Pain is worsened by laying or sitting and improves some with walking. She has tried Advil and Tylenol which have not helped.  She has been using a heating pad which helps a little very short-term.  She has been unable to sleep due to pain. No loss of bowel or bladder control. She does have chronic low-back pain which is no worse than normal.  She is known to have spina bifida occulta which was an incidental finding on imaging around 20 years ago and she has had no imaging studies since then.           The following portions of the patient's history were reviewed and updated as appropriate: allergies, current medications, past family history, past medical history, past social history, past surgical history and problem list.    Review of Systems   Constitutional: Negative for appetite change, chills, fatigue, fever and unexpected weight change.   Respiratory: Negative for cough, chest tightness, shortness of breath and wheezing.    Cardiovascular: Negative for chest pain, palpitations and leg swelling.   Gastrointestinal: Negative for abdominal pain.   Genitourinary: Negative.  Negative for decreased urine volume, difficulty urinating, dysuria, frequency and urgency.   Musculoskeletal: Positive for arthralgias, back pain and gait problem. Negative for joint swelling, neck pain and neck stiffness.   Skin: Negative.    Neurological: Negative for dizziness, seizures, syncope, facial asymmetry, weakness, numbness and headaches.   Psychiatric/Behavioral: Positive for sleep disturbance. Negative for behavioral problems, confusion, dysphoric mood, self-injury and suicidal ideas.  The patient is not nervous/anxious.          Objective    Physical Exam   Constitutional: She is oriented to person, place, and time. She appears well-developed and well-nourished. No distress.   HENT:   Head: Normocephalic and atraumatic.   Eyes: Conjunctivae and EOM are normal. Pupils are equal, round, and reactive to light.   Neck: Normal range of motion. Neck supple.   Cardiovascular: Normal rate, regular rhythm and normal heart sounds. Exam reveals no gallop and no friction rub.   No murmur heard.  Pulmonary/Chest: Effort normal and breath sounds normal. No stridor. No respiratory distress. She has no wheezes. She has no rales. She exhibits no tenderness.   Abdominal: Soft. Bowel sounds are normal. She exhibits no mass. There is no tenderness. No hernia.   Musculoskeletal: She exhibits tenderness ( L3-S1 vertebral tenderness with bilateral SI joint tenderness (right greater than left) and right gluteal tenderness.). She exhibits no edema or deformity.   Range of motion slowed due to pain.  Unable to assess straight leg raise due to pain.   Lymphadenopathy:     She has no cervical adenopathy.   Neurological: She is alert and oriented to person, place, and time. She displays normal reflexes. No cranial nerve deficit or sensory deficit. She exhibits normal muscle tone. Coordination normal.   Skin: Skin is warm and dry. Capillary refill takes less than 2 seconds. No rash noted. She is not diaphoretic.   Psychiatric: She has a normal mood and affect. Her behavior is normal. Judgment and thought content normal.   Nursing note and vitals reviewed.        /62   Pulse 70   Temp 99.2 °F (37.3 °C)   SpO2 99%     Nursing note and vitals reviewed.          Assessment/Plan   Maddi was seen today for abstract.    Diagnoses and all orders for this visit:    Acute exacerbation of chronic low back pain  -     XR Spine Lumbar 4+ View  -     cyclobenzaprine (FLEXERIL) 10 MG tablet; Take 1 tablet by mouth At Night As  Needed for Muscle Spasms.    Sciatica associated with disorder of lumbosacral spine  -     XR Spine Lumbar 4+ View    Spina bifida occulta  -     XR Spine Lumbar 4+ View      Provided with instructions regarding low back exercises and stretches.  Encouraged her to continue heat as needed followed by stretching.  May use Flexeril at bedtime as needed due to pain causing sleep disturbances.  She declined physical therapy referral today.    Call or return to clinic if symptoms worsen or persist.

## 2019-05-09 PROBLEM — M53.87 SCIATICA ASSOCIATED WITH DISORDER OF LUMBOSACRAL SPINE: Status: ACTIVE | Noted: 2019-05-09

## 2019-07-18 RX ORDER — ROSUVASTATIN CALCIUM 10 MG/1
TABLET, COATED ORAL
Qty: 90 TABLET | Refills: 1 | Status: SHIPPED | OUTPATIENT
Start: 2019-07-18 | End: 2020-01-13

## 2019-09-30 ENCOUNTER — OFFICE VISIT (OUTPATIENT)
Dept: INTERNAL MEDICINE | Facility: CLINIC | Age: 54
End: 2019-09-30

## 2019-09-30 VITALS
HEART RATE: 74 BPM | HEIGHT: 62 IN | BODY MASS INDEX: 26.13 KG/M2 | SYSTOLIC BLOOD PRESSURE: 116 MMHG | WEIGHT: 142 LBS | OXYGEN SATURATION: 100 % | TEMPERATURE: 98.8 F | DIASTOLIC BLOOD PRESSURE: 74 MMHG

## 2019-09-30 DIAGNOSIS — Z01.818 PREOP GENERAL PHYSICAL EXAM: Primary | ICD-10-CM

## 2019-09-30 DIAGNOSIS — Z23 NEED FOR IMMUNIZATION AGAINST INFLUENZA: ICD-10-CM

## 2019-09-30 DIAGNOSIS — M54.2 CHRONIC NECK PAIN: ICD-10-CM

## 2019-09-30 DIAGNOSIS — G89.29 CHRONIC NECK PAIN: ICD-10-CM

## 2019-09-30 DIAGNOSIS — G89.29 CHRONIC UPPER BACK PAIN: ICD-10-CM

## 2019-09-30 DIAGNOSIS — M54.9 CHRONIC UPPER BACK PAIN: ICD-10-CM

## 2019-09-30 DIAGNOSIS — N62 LARGE BREASTS: ICD-10-CM

## 2019-09-30 DIAGNOSIS — M21.611 BUNION, RIGHT FOOT: ICD-10-CM

## 2019-09-30 LAB
ALBUMIN SERPL-MCNC: 4.5 G/DL (ref 3.5–5.2)
ALBUMIN/GLOB SERPL: 2.4 G/DL
ALP SERPL-CCNC: 71 U/L (ref 39–117)
ALT SERPL-CCNC: 13 U/L (ref 1–33)
AST SERPL-CCNC: 18 U/L (ref 1–32)
BASOPHILS # BLD AUTO: 0.02 10*3/MM3 (ref 0–0.2)
BASOPHILS NFR BLD AUTO: 0.5 % (ref 0–1.5)
BILIRUB SERPL-MCNC: 0.4 MG/DL (ref 0.2–1.2)
BUN SERPL-MCNC: 11 MG/DL (ref 6–20)
BUN/CREAT SERPL: 14.9 (ref 7–25)
CALCIUM SERPL-MCNC: 9.1 MG/DL (ref 8.6–10.5)
CHLORIDE SERPL-SCNC: 108 MMOL/L (ref 98–107)
CO2 SERPL-SCNC: 24.4 MMOL/L (ref 22–29)
CREAT SERPL-MCNC: 0.74 MG/DL (ref 0.57–1)
EOSINOPHIL # BLD AUTO: 0.08 10*3/MM3 (ref 0–0.4)
EOSINOPHIL NFR BLD AUTO: 2 % (ref 0.3–6.2)
ERYTHROCYTE [DISTWIDTH] IN BLOOD BY AUTOMATED COUNT: 13.7 % (ref 12.3–15.4)
GLOBULIN SER CALC-MCNC: 1.9 GM/DL
GLUCOSE SERPL-MCNC: 90 MG/DL (ref 65–99)
HCT VFR BLD AUTO: 33.5 % (ref 34–46.6)
HGB BLD-MCNC: 10.8 G/DL (ref 12–15.9)
IMM GRANULOCYTES # BLD AUTO: 0.01 10*3/MM3 (ref 0–0.05)
IMM GRANULOCYTES NFR BLD AUTO: 0.3 % (ref 0–0.5)
LYMPHOCYTES # BLD AUTO: 1.36 10*3/MM3 (ref 0.7–3.1)
LYMPHOCYTES NFR BLD AUTO: 34.8 % (ref 19.6–45.3)
MCH RBC QN AUTO: 28.3 PG (ref 26.6–33)
MCHC RBC AUTO-ENTMCNC: 32.2 G/DL (ref 31.5–35.7)
MCV RBC AUTO: 87.9 FL (ref 79–97)
MONOCYTES # BLD AUTO: 0.3 10*3/MM3 (ref 0.1–0.9)
MONOCYTES NFR BLD AUTO: 7.7 % (ref 5–12)
NEUTROPHILS # BLD AUTO: 2.14 10*3/MM3 (ref 1.7–7)
NEUTROPHILS NFR BLD AUTO: 54.7 % (ref 42.7–76)
NRBC BLD AUTO-RTO: 0 /100 WBC (ref 0–0.2)
PLATELET # BLD AUTO: 172 10*3/MM3 (ref 140–450)
POTASSIUM SERPL-SCNC: 4.2 MMOL/L (ref 3.5–5.2)
PROT SERPL-MCNC: 6.4 G/DL (ref 6–8.5)
RBC # BLD AUTO: 3.81 10*6/MM3 (ref 3.77–5.28)
SODIUM SERPL-SCNC: 143 MMOL/L (ref 136–145)
WBC # BLD AUTO: 3.91 10*3/MM3 (ref 3.4–10.8)

## 2019-09-30 PROCEDURE — 90471 IMMUNIZATION ADMIN: CPT | Performed by: PHYSICIAN ASSISTANT

## 2019-09-30 PROCEDURE — 90674 CCIIV4 VAC NO PRSV 0.5 ML IM: CPT | Performed by: PHYSICIAN ASSISTANT

## 2019-09-30 PROCEDURE — 99243 OFF/OP CNSLTJ NEW/EST LOW 30: CPT | Performed by: PHYSICIAN ASSISTANT

## 2019-09-30 NOTE — PROGRESS NOTES
Subjective   Maddi Betancur is a 54 y.o. female who presents to the office today for a preoperative consultation at the request of surgeon Dr. Johnston who plans on performing osteotomy of the right 1st distal-metatarsal  on October 28. Planned anesthesia: general. The patient has the following known anesthesia issues: none. Patients bleeding risk: no recent abnormal bleeding, no remote history of abnormal bleeding and no use of Ca-channel blockers. Patient does not have objections to receiving blood products if needed.    Today she is concerned with chronic neck and upper back pain which she feels is due to her breasts being heavy. This has been bothersome to her for many years but seems to be worsening.     The following portions of the patient's history were reviewed and updated as appropriate: allergies, current medications, past family history, past medical history, past social history, past surgical history and problem list.    Patient Active Problem List    Diagnosis   • Sciatica associated with disorder of lumbosacral spine [M53.87]   • Spina bifida occulta [Q76.0]   • Mixed hyperlipidemia [E78.2]   • Atopic rhinitis [J30.9]   • Asthma [J45.909]   • Depression [F32.9]     Current Outpatient Medications on File Prior to Visit   Medication Sig Dispense Refill   • azelastine (OPTIVAR) 0.05 % ophthalmic solution Apply  to eye every 12 (twelve) hours.     • buPROPion XL (WELLBUTRIN XL) 300 MG 24 hr tablet TAKE 1 TABLET EVERY MORNING*PAR MFR* 90 tablet 3   • conjugated estrogens (PREMARIN) 0.625 MG/GM vaginal cream Use 0.5  grams intravaginally 2 times per week to control symptoms. 30 g 0   • cyclobenzaprine (FLEXERIL) 10 MG tablet Take 1 tablet by mouth At Night As Needed for Muscle Spasms. 30 tablet 0   • EPIPEN 2-DONNA 0.3 MG/0.3ML solution auto-injector injection use as directed for ALLERGIC REACTION  0   • rosuvastatin (CRESTOR) 10 MG tablet TAKE 1 TABLET NIGHTLY      *GLENMARK MFR* 90 tablet 1     No current  "facility-administered medications on file prior to visit.        Review of Systems  A comprehensive review of systems was negative except for: Musculoskeletal: positive for arthralgias, back pain, myalgias and neck pain    Objective   /74   Pulse 74   Temp 98.8 °F (37.1 °C)   Ht 157.5 cm (62.01\")   Wt 64.4 kg (142 lb)   SpO2 100%   BMI 25.97 kg/m²     General Appearance:    Alert, cooperative, no distress, appears stated age   Head:    Normocephalic, without obvious abnormality, atraumatic   Eyes:    PERRL, conjunctiva/corneas clear, EOM's intact, fundi     benign, both eyes   Ears:    Normal TM's and external ear canals, both ears   Nose:   Nares normal, septum midline, mucosa normal, no drainage    or sinus tenderness   Throat:   Lips, mucosa, and tongue normal; gums normal; partial upper dentures   Neck:   Supple, symmetrical, trachea midline, no adenopathy;     thyroid:  no enlargement/tenderness/nodules; no carotid    bruit or JVD   Back:     Bilateral trapezius tenderness. Symmetric, no curvature, ROM normal, no CVA tenderness   Lungs:     Clear to auscultation bilaterally, respirations unlabored   Chest Wall:    No tenderness or deformity    Heart:    Regular rate and rhythm, S1 and S2 normal, no murmur, rub   or gallop   Breast Exam:    Large breasts   Abdomen:     Soft, non-tender, bowel sounds active all four quadrants,     no masses, no organomegaly   Extremities:   Right bunion deformity. Extremities atraumatic, no cyanosis   or edema   Pulses:   2+ and symmetric all extremities   Skin:   Skin color, texture, turgor normal, no rashes or lesions   Lymph nodes:   Cervical, supraclavicular, and axillary nodes normal   Neurologic:   CNII-XII intact, normal strength, sensation and reflexes     throughout       Predictors of intubation difficulty:   Morbid obesity? no   Anatomically abnormal facies? no   Prominent incisors? no   Receding mandible? no   Short, thick neck? no   Neck range of motion: " normal   Dentition: partial upper dentures        Assessment/Plan   54 y.o. female with planned surgery as above.    Known risk factors for perioperative complications: None    Difficulty with intubation is not anticipated.    Cardiac Risk Estimation: low    Current medications which may produce withdrawal symptoms if withheld perioperatively: none    1. Preoperative workup as follows CBC, CMP.  2. Change in medication regimen before surgery: d/c NSAIDS 14 days before surgery, continue medication regimen including morning of surgery, with sip of water.  3. Prophylaxis for cardiac events with perioperative beta-blockers: should be considered, specific regimen per anesthesia.  4. Invasive hemodynamic monitoring perioperatively: at the discretion of anesthesiologist.  5. Deep vein thrombosis prophylaxis postoperatively:regimen to be chosen by surgical team.  6. Surveillance for postoperative MI with ECG immediately postoperatively and on postoperative days 1 and 2 AND troponin levels 24 hours postoperatively and on day 4 or hospital discharge (whichever comes first): should be considered.    Maddi was seen today for follow-up and pre-op exam.    Diagnoses and all orders for this visit:    Preop general physical exam  -     CBC & Differential  -     Comprehensive Metabolic Panel    Bunion, right foot  -     CBC & Differential  -     Comprehensive Metabolic Panel    Large breasts  -     Ambulatory Referral to Plastic Surgery    Chronic neck pain  -     Ambulatory Referral to Plastic Surgery    Chronic upper back pain  -     Ambulatory Referral to Plastic Surgery    Need for immunization against influenza  -     Flucelvax Quad=>4Years ([phone])      Pre-operative consultation note was faxed to requesting surgeon on 10/1/2019 after review of labs.

## 2019-10-02 ENCOUNTER — TELEPHONE (OUTPATIENT)
Dept: INTERNAL MEDICINE | Facility: CLINIC | Age: 54
End: 2019-10-02

## 2019-10-02 NOTE — TELEPHONE ENCOUNTER
Janeen called from [facility] (Dr. De Anda's office) stating that they are needing a Letter of Medical Necessity from the patient's provider in order for them to be able to proceed with patient's breast reduction surgery process.

## 2019-10-03 ENCOUNTER — TELEPHONE (OUTPATIENT)
Dept: INTERNAL MEDICINE | Facility: CLINIC | Age: 54
End: 2019-10-03

## 2019-10-24 DIAGNOSIS — Z01.818 PREOP EXAMINATION: Primary | ICD-10-CM

## 2019-10-24 LAB
ERYTHROCYTE [DISTWIDTH] IN BLOOD BY AUTOMATED COUNT: 13.5 % (ref 12.3–15.4)
HCT VFR BLD AUTO: 32 % (ref 34–46.6)
HGB BLD-MCNC: 10.3 G/DL (ref 12–15.9)
MCH RBC QN AUTO: 27.7 PG (ref 26.6–33)
MCHC RBC AUTO-ENTMCNC: 32.2 G/DL (ref 31.5–35.7)
MCV RBC AUTO: 86 FL (ref 79–97)
PLATELET # BLD AUTO: 198 10*3/MM3 (ref 140–450)
RBC # BLD AUTO: 3.72 10*6/MM3 (ref 3.77–5.28)
WBC # BLD AUTO: 3.43 10*3/MM3 (ref 3.4–10.8)

## 2019-11-14 ENCOUNTER — OFFICE VISIT (OUTPATIENT)
Dept: INTERNAL MEDICINE | Facility: CLINIC | Age: 54
End: 2019-11-14

## 2019-11-14 VITALS
WEIGHT: 138 LBS | HEART RATE: 73 BPM | TEMPERATURE: 98.8 F | DIASTOLIC BLOOD PRESSURE: 78 MMHG | HEIGHT: 62 IN | OXYGEN SATURATION: 98 % | BODY MASS INDEX: 25.4 KG/M2 | SYSTOLIC BLOOD PRESSURE: 118 MMHG

## 2019-11-14 DIAGNOSIS — N60.92 ATYPICAL LOBULAR HYPERPLASIA (ALH) OF LEFT BREAST: Primary | ICD-10-CM

## 2019-11-14 PROCEDURE — 99213 OFFICE O/P EST LOW 20 MIN: CPT | Performed by: PHYSICIAN ASSISTANT

## 2019-11-14 NOTE — PROGRESS NOTES
Maddi Betancur is a 54 y.o. female.     Subjective   History of Present Illness   Here today accompanied by her  to discuss abnormal pathology result of the left breast.  She underwent bilateral reduction mammoplasty on 11/5/19 at which time tissue and skin from each breast was sent for pathology.  Left breast tissue returned positive for atypical lobular hyperplasia involving terminal ducts and lobules; negative for invasive carcinoma. Right breast pathology was normal.  Prior to the surgery she had a negative screening mammogram on 10/3/2019.  She notes that her maternal aunt had breast cancer which was treated and in remission when it returned and became metastatic.  No known family history of ovarian cancer.  She does not know any family history from her father's side.  She reports that surgical incisions are healing well without any complications.  She has remained afebrile and denies any discharge from the incision sites.        The following portions of the patient's history were reviewed and updated as appropriate: allergies, current medications, past family history, past medical history, past social history, past surgical history and problem list.    Review of Systems   Constitutional: Negative for appetite change, chills, fatigue, fever and unexpected weight change.   Eyes: Negative for visual disturbance.   Respiratory: Negative for cough, chest tightness, shortness of breath and wheezing.    Cardiovascular: Negative for chest pain, palpitations and leg swelling.   Endocrine: Negative for cold intolerance, heat intolerance, polydipsia, polyphagia and polyuria.   Skin: Positive for wound (surgical incisions, well healing). Negative for color change.   Allergic/Immunologic: Negative for immunocompromised state.   Neurological: Negative for dizziness, tremors, seizures, speech difficulty, weakness, numbness and headaches.   Hematological: Negative for adenopathy. Does not bruise/bleed easily.  "  Psychiatric/Behavioral: Negative for agitation, confusion, dysphoric mood, self-injury and suicidal ideas. The patient is nervous/anxious.          Objective    Physical Exam   Constitutional: She is oriented to person, place, and time. She appears well-developed and well-nourished. No distress.   HENT:   Head: Normocephalic and atraumatic.   Pulmonary/Chest: Effort normal.   Breast exam deferred today due to bilateral reduction mammoplasty 1 week ago.    Neurological: She is alert and oriented to person, place, and time. Coordination normal.   Skin: She is not diaphoretic.   Psychiatric: Her behavior is normal. Judgment and thought content normal.   Anxious mood with congruent affect.     Nursing note and vitals reviewed.          /78   Pulse 73   Temp 98.8 °F (37.1 °C)   Ht 157.5 cm (62.01\")   Wt 62.6 kg (138 lb)   SpO2 98%   BMI 25.23 kg/m²     Nursing note and vitals reviewed.        Assessment/Plan   Maddi was seen today for follow-up.    Diagnoses and all orders for this visit:    Atypical lobular hyperplasia (ALH) of left breast  -     Ambulatory Referral to Breast Surgery      Diagnostic mammogram will not be ordered at this time due to recent bilateral reduction mammoplasty just 1 week ago.  She has been referred to breast surgery for consultation and continued follow-up.    Both the patient and her  are agreeable with this plan.  Reassurance provided that this is not a diagnosis of breast cancer but will need continued close follow-up.         "

## 2019-12-09 ENCOUNTER — PATIENT MESSAGE (OUTPATIENT)
Dept: INTERNAL MEDICINE | Facility: CLINIC | Age: 54
End: 2019-12-09

## 2019-12-10 ENCOUNTER — TELEPHONE (OUTPATIENT)
Dept: INTERNAL MEDICINE | Facility: CLINIC | Age: 54
End: 2019-12-10

## 2019-12-10 NOTE — TELEPHONE ENCOUNTER
Patient is needing clinical to call her back relating to her mother. Said its a long story/issue and she needs to talk to someone.

## 2020-01-13 RX ORDER — ROSUVASTATIN CALCIUM 10 MG/1
TABLET, COATED ORAL
Qty: 90 TABLET | Refills: 1 | Status: SHIPPED | OUTPATIENT
Start: 2020-01-13 | End: 2020-07-06

## 2020-01-30 ENCOUNTER — OFFICE VISIT (OUTPATIENT)
Dept: INTERNAL MEDICINE | Facility: CLINIC | Age: 55
End: 2020-01-30

## 2020-01-30 ENCOUNTER — RESULTS ENCOUNTER (OUTPATIENT)
Dept: INTERNAL MEDICINE | Facility: CLINIC | Age: 55
End: 2020-01-30

## 2020-01-30 VITALS
HEIGHT: 62 IN | TEMPERATURE: 98.6 F | DIASTOLIC BLOOD PRESSURE: 76 MMHG | SYSTOLIC BLOOD PRESSURE: 110 MMHG | HEART RATE: 70 BPM | WEIGHT: 140 LBS | BODY MASS INDEX: 25.76 KG/M2 | OXYGEN SATURATION: 99 %

## 2020-01-30 DIAGNOSIS — R00.2 PALPITATIONS: ICD-10-CM

## 2020-01-30 DIAGNOSIS — E55.9 VITAMIN D DEFICIENCY: ICD-10-CM

## 2020-01-30 DIAGNOSIS — Z00.00 PREVENTATIVE HEALTH CARE: ICD-10-CM

## 2020-01-30 DIAGNOSIS — Z12.11 SCREEN FOR COLON CANCER: ICD-10-CM

## 2020-01-30 DIAGNOSIS — Z00.00 ANNUAL PHYSICAL EXAM: Primary | ICD-10-CM

## 2020-01-30 DIAGNOSIS — E78.2 MIXED HYPERLIPIDEMIA: ICD-10-CM

## 2020-01-30 DIAGNOSIS — F34.1 DYSTHYMIA: ICD-10-CM

## 2020-01-30 PROCEDURE — 93225 XTRNL ECG REC<48 HRS REC: CPT | Performed by: PHYSICIAN ASSISTANT

## 2020-01-30 PROCEDURE — 99396 PREV VISIT EST AGE 40-64: CPT | Performed by: PHYSICIAN ASSISTANT

## 2020-01-30 RX ORDER — BUPROPION HYDROCHLORIDE 300 MG/1
300 TABLET ORAL EVERY MORNING
Qty: 90 TABLET | Refills: 3 | Status: SHIPPED | OUTPATIENT
Start: 2020-01-30 | End: 2021-01-12

## 2020-01-30 RX ORDER — FLUTICASONE PROPIONATE 50 MCG
2 SPRAY, SUSPENSION (ML) NASAL DAILY
COMMUNITY

## 2020-01-30 RX ORDER — PREDNISONE 20 MG/1
TABLET ORAL
COMMUNITY
Start: 2020-01-13 | End: 2020-02-14

## 2020-01-30 NOTE — PROGRESS NOTES
Subjective   Maddi Betancur is a 54 y.o. female and is here for a comprehensive physical exam. The patient reports problems - ankle fracture.    HPI: Atypical lobular hyperplasia of the left breast noted a few months ago when she underwent breast reduction. She is now followed by surgeon, Dr. MIRELES, who has referred her for genetic testing which she will have next week.     She has a stress fracture in the right ankle and will have a bone density scan next week due to this being the second fracture within the last year.     A couple of times per day she feels her heart flutter which is associated with SOA lasting a few seconds.  No associated chest pain, orthopnea, leg edema, headaches, visual disturbance, diaphoresis or confusion.  She denies any current symptoms.           Do you take any herbs or supplements that were not prescribed by a doctor? yes  Are you taking calcium supplements? No  Are you taking aspirin daily? No     History:  LMP: No LMP recorded. Patient has had a hysterectomy.  Menopause: Yes  Last pap date: 2018, normal  Abnormal pap? historical, personal history of cervical cancer  Family history of breast or ovarian cancer: yes         OB History    Para Term  AB Living   4       1 3   SAB TAB Ectopic Molar Multiple Live Births   1                # Outcome Date GA Lbr Chau/2nd Weight Sex Delivery Anes PTL Lv   4             3             2             1 SAB               reports that she currently engages in sexual activity. She reports using the following method of birth control/protection: Surgical.        The following portions of the patient's history were reviewed and updated as appropriate: She  has a past medical history of Acid reflux, Anemia, Asthma, BMI 34.0-34.9,adult, Cancer (CMS/HCC), and Depression.  She does not have any pertinent problems on file.  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  Her family history includes Breast cancer in her maternal aunt; Cervical cancer in her mother; Hyperlipidemia in her mother; Hypertension in her mother; Lung cancer in her mother; Lymphoma in her mother.  She  reports that she has quit smoking. She has never used smokeless tobacco. She reports that she does not drink alcohol or use drugs.  Current Outpatient Medications   Medication Sig Dispense Refill   • azelastine (OPTIVAR) 0.05 % ophthalmic solution Apply  to eye every 12 (twelve) hours.     • B Complex Vitamins (VITAMIN B COMPLEX PO) Take  by mouth.     • betamethasone valerate (VALISONE) 0.1 % cream betamethasone valerate 0.1 % topical cream   As Directed     • buPROPion XL (WELLBUTRIN XL) 300 MG 24 hr tablet Take 1 tablet by mouth Every Morning. 90 tablet 3   • EPIPEN 2-DONNA 0.3 MG/0.3ML solution auto-injector injection use as directed for ALLERGIC REACTION  0   • fluticasone (FLONASE ALLERGY RELIEF) 50 MCG/ACT nasal spray Flonase Allergy Relief 50 mcg/actuation nasal spray,suspension   Daily     • Multiple Vitamins-Minerals (AIRBORNE PO) Take  by mouth.     • predniSONE (DELTASONE) 20 MG tablet      • rosuvastatin (CRESTOR) 10 MG tablet TAKE 1 TABLET NIGHTLY      *GLENMARK* 90 tablet 1     No current facility-administered medications for this visit.        Review of Systems  Do you have pain that bothers you in your daily life? yes    Review of Systems   Constitutional: Negative for appetite change, chills, fatigue, fever and unexpected weight change.   HENT: Negative for congestion, drooling, ear pain, hearing loss, nosebleeds, postnasal drip, rhinorrhea, sore throat, tinnitus and trouble swallowing.    Eyes: Negative for photophobia, discharge and visual disturbance.   Respiratory: Positive for shortness of breath. Negative for cough, chest tightness and wheezing.    Cardiovascular: Positive for  "palpitations. Negative for chest pain and leg swelling.   Gastrointestinal: Negative for abdominal distention, abdominal pain, blood in stool, constipation, diarrhea, nausea and vomiting.   Endocrine: Negative for cold intolerance, heat intolerance, polydipsia, polyphagia and polyuria.   Genitourinary: Negative.    Musculoskeletal: Positive for arthralgias. Negative for back pain, joint swelling, myalgias, neck pain and neck stiffness.   Skin: Negative for color change, pallor, rash and wound.   Allergic/Immunologic: Negative for environmental allergies, food allergies and immunocompromised state.   Neurological: Negative for dizziness, tremors, seizures, weakness, numbness and headaches.   Hematological: Negative for adenopathy. Does not bruise/bleed easily.   Psychiatric/Behavioral: Positive for dysphoric mood (stable). Negative for agitation, behavioral problems, confusion, hallucinations, self-injury and suicidal ideas. The patient is not nervous/anxious.          Objective   /76   Pulse 70   Temp 98.6 °F (37 °C)   Ht 157.5 cm (62.01\")   Wt 63.5 kg (140 lb)   SpO2 99%   BMI 25.60 kg/m²     Physical Exam   Constitutional: She is oriented to person, place, and time. She appears well-developed and well-nourished. No distress.   HENT:   Head: Normocephalic and atraumatic.   Right Ear: External ear normal.   Left Ear: External ear normal.   Nose: Nose normal.   Mouth/Throat: Oropharynx is clear and moist. No oropharyngeal exudate.   Eyes: Pupils are equal, round, and reactive to light. Conjunctivae and EOM are normal. No scleral icterus.   Neck: Normal range of motion. Neck supple. No thyromegaly present.   Cardiovascular: Normal rate, regular rhythm and normal heart sounds. Exam reveals no gallop and no friction rub.   No murmur heard.  Pulmonary/Chest: Effort normal and breath sounds normal. No respiratory distress. She has no wheezes. She has no rales. She exhibits no tenderness.   Abdominal: Soft. " Bowel sounds are normal. She exhibits no distension and no mass. There is no tenderness. There is no rebound.   Musculoskeletal: She exhibits no edema, tenderness or deformity.   Brace on left ankle with boot on right foot.    Lymphadenopathy:     She has no cervical adenopathy.   Neurological: She is alert and oriented to person, place, and time. She displays normal reflexes. No cranial nerve deficit or sensory deficit.   Skin: Skin is warm and dry. Capillary refill takes less than 2 seconds. No rash noted. She is not diaphoretic. No pallor.   Psychiatric: She has a normal mood and affect. Her behavior is normal. Judgment and thought content normal.   Nursing note and vitals reviewed.         Assessment/Plan   Healthy female exam.     1.    Diagnosis Plan   1. Annual physical exam     2. BMI 25.0-25.9,adult  Patient's Body mass index is 25.6 kg/m². BMI is above normal parameters. Recommendations include: educational material.     3. Vitamin D deficiency  Vitamin D 25 Hydroxy  No current supplement use.      4. Mixed hyperlipidemia  Lipid Panel     5. Dysthymia  Well controlled, continue: buPROPion XL (WELLBUTRIN XL) 300 MG 24 hr tablet     6. Preventative health care  Lipid Panel    Comprehensive Metabolic Panel     7. Palpitations  Holter monitor - 24 hour  -she declined cardiology referral today.     8. Screen for colon cancer  Cologuard - Stool, Per Rectum  -she was advised to check with her insurance company to verify coverage for this test before returning sample.     She was asked to have her orthopedic surgeon send me results of upcoming bone density scan for review.       2. Patient Counseling:  --Nutrition: Stressed importance of moderation in sodium/caffeine intake, saturated fat and cholesterol, caloric balance, sufficient intake of fresh fruits, vegetables, fiber, calcium, iron.  --Discussed the issue of calcium supplement, and the daily use of baby aspirin if applicable.             --Mammogram  recommended every 2 years from age 40-49 and yearly beginning at age 50.  --Exercise: Stressed the importance of regular exercise.   --Substance Abuse: Discussed cessation/primary prevention of tobacco (if applicable), alcohol, or other drug use (if applicable); driving or other dangerous activities under the influence; availability of treatment for abuse.    --Sexuality: Discussed sexually transmitted diseases, partner selection, use of condoms, avoidance of unintended pregnancy  and contraceptive alternatives.   --Injury prevention: Discussed safety belts, safety helmets, smoke detector, smoking near bedding or upholstery.   --Dental health: Discussed importance of regular tooth brushing, flossing, and dental visits every 6 months.  --Immunizations reviewed.  --Discussed benefits of screening colonoscopy (if applicable).  --After hours service discussed with patient.    3. Discussed the patient's BMI with her.  The BMI is above average; BMI management plan is completed.  4. Return in about 1 year (around 1/30/2021) for Next scheduled follow up.         IVAN Martel  01/30/2020  12:38 PM

## 2020-01-31 ENCOUNTER — CLINICAL SUPPORT (OUTPATIENT)
Dept: INTERNAL MEDICINE | Facility: CLINIC | Age: 55
End: 2020-01-31

## 2020-01-31 DIAGNOSIS — R00.2 PALPITATIONS: ICD-10-CM

## 2020-01-31 PROBLEM — E55.9 VITAMIN D DEFICIENCY: Status: ACTIVE | Noted: 2020-01-31

## 2020-01-31 LAB
25(OH)D3+25(OH)D2 SERPL-MCNC: 20.9 NG/ML (ref 30–100)
ALBUMIN SERPL-MCNC: 4.3 G/DL (ref 3.5–5.2)
ALBUMIN/GLOB SERPL: 2.2 G/DL
ALP SERPL-CCNC: 81 U/L (ref 39–117)
ALT SERPL-CCNC: 20 U/L (ref 1–33)
AST SERPL-CCNC: 13 U/L (ref 1–32)
BILIRUB SERPL-MCNC: 0.5 MG/DL (ref 0.2–1.2)
BUN SERPL-MCNC: 10 MG/DL (ref 6–20)
BUN/CREAT SERPL: 13.5 (ref 7–25)
CALCIUM SERPL-MCNC: 9.2 MG/DL (ref 8.6–10.5)
CHLORIDE SERPL-SCNC: 103 MMOL/L (ref 98–107)
CHOLEST SERPL-MCNC: 162 MG/DL (ref 0–200)
CO2 SERPL-SCNC: 28 MMOL/L (ref 22–29)
CREAT SERPL-MCNC: 0.74 MG/DL (ref 0.57–1)
GLOBULIN SER CALC-MCNC: 2 GM/DL
GLUCOSE SERPL-MCNC: 82 MG/DL (ref 65–99)
HDLC SERPL-MCNC: 77 MG/DL (ref 40–60)
LDLC SERPL CALC-MCNC: 69 MG/DL (ref 0–100)
POTASSIUM SERPL-SCNC: 3.9 MMOL/L (ref 3.5–5.2)
PROT SERPL-MCNC: 6.3 G/DL (ref 6–8.5)
SODIUM SERPL-SCNC: 142 MMOL/L (ref 136–145)
TRIGL SERPL-MCNC: 80 MG/DL (ref 0–150)
VLDLC SERPL CALC-MCNC: 16 MG/DL

## 2020-01-31 PROCEDURE — 93227 XTRNL ECG REC<48 HR R&I: CPT | Performed by: PHYSICIAN ASSISTANT

## 2020-01-31 RX ORDER — BUPROPION HYDROCHLORIDE 300 MG/1
TABLET ORAL
Qty: 90 TABLET | Refills: 3 | OUTPATIENT
Start: 2020-01-31

## 2020-02-03 DIAGNOSIS — I47.1 PAROXYSMAL SVT (SUPRAVENTRICULAR TACHYCARDIA) (HCC): ICD-10-CM

## 2020-02-03 DIAGNOSIS — R00.2 PALPITATIONS: ICD-10-CM

## 2020-02-03 DIAGNOSIS — R00.2 PALPITATIONS: Primary | ICD-10-CM

## 2020-02-05 ENCOUNTER — CONSULT (OUTPATIENT)
Dept: CARDIOLOGY | Facility: CLINIC | Age: 55
End: 2020-02-05

## 2020-02-05 VITALS
HEART RATE: 81 BPM | WEIGHT: 145 LBS | OXYGEN SATURATION: 98 % | SYSTOLIC BLOOD PRESSURE: 110 MMHG | DIASTOLIC BLOOD PRESSURE: 90 MMHG | HEIGHT: 62 IN | BODY MASS INDEX: 26.68 KG/M2

## 2020-02-05 DIAGNOSIS — E78.2 MIXED HYPERLIPIDEMIA: ICD-10-CM

## 2020-02-05 DIAGNOSIS — R00.2 PALPITATIONS: ICD-10-CM

## 2020-02-05 DIAGNOSIS — R06.09 EXERTIONAL DYSPNEA: Primary | ICD-10-CM

## 2020-02-05 PROCEDURE — 99244 OFF/OP CNSLTJ NEW/EST MOD 40: CPT | Performed by: INTERNAL MEDICINE

## 2020-02-05 PROCEDURE — 93000 ELECTROCARDIOGRAM COMPLETE: CPT | Performed by: INTERNAL MEDICINE

## 2020-02-05 NOTE — PROGRESS NOTES
"     [facility] Cardiology OP Consult Note    Maddi Betancur  [id number]  2020    Referred By: Leonor Ambriz, *    Chief Complaint: Palpitations    History of Present Illness:   Mrs. Maddi Betancur is a 54 y.o. female who presents to the Cardiology Clinic for evaluation of palpitations and exertional dyspnea.  The patient has a past medical history significant for hyperlipidemia, asthma, and a strong family history significant of CAD.  She also has bilateral lower extremity orthopedic issues, for which she has been in bilateral foot braces for approximately the past 2 years.  She presents the cardiology clinic today primarily for evaluation of palpitations.  The patient reports intermittent episodes of palpitations described as a \"racing\" heart rate which results in a \"tense\" feeling in her neck.  She denies any significant dizziness or lightheadedness.  No prior presyncopal or syncopal episodes.  The episodes typically last for several minutes before resolving spontaneously.  In addition to her palpitations, she describes exertional dyspnea.  She reports that she was last able to be active in October, at which time she was doing regular aerobic exercise.  While on the elliptical, she would note increased shortness of breath above her baseline.  No significant chest pain or chest discomfort associated with exertion.  Since October, however, the patient has been unable to exercise due to her orthopedic issues.  She denies orthopnea, PND, or lower extremity edema.    Past Cardiac Testin. Last Coronary Angio: None  2. Prior Stress Testing: None  3. Last Echo: 24-hour Holter monitor 2020   1.  The predominant rhythm is sinus rhythm   2.  Rare supraventricular and ventricular ectopy   3.  No sustained arrhythmias  4. Prior Holter Monitor: None    Review of Systems:   Review of Systems   Constitutional: Negative for activity change, appetite change, chills, diaphoresis, fatigue, " fever, unexpected weight gain and unexpected weight loss.   Respiratory: Positive for shortness of breath. Negative for cough, chest tightness and wheezing.    Cardiovascular: Positive for palpitations. Negative for chest pain and leg swelling.   Gastrointestinal: Negative for abdominal pain, anal bleeding, blood in stool and GERD.   Endocrine: Negative for cold intolerance and heat intolerance.   Genitourinary: Negative for hematuria.   Musculoskeletal: Positive for arthralgias.   Neurological: Negative for dizziness, syncope, weakness and light-headedness.   Hematological: Does not bruise/bleed easily.   Psychiatric/Behavioral: Negative for depressed mood and stress. The patient is not nervous/anxious.        Past Medical History:   Past Medical History:   Diagnosis Date   • Acid reflux    • Anemia    • Asthma    • BMI 34.0-34.9,adult    • Cancer (CMS/HCC)     Cervical   • Depression        Past Surgical History:   Past Surgical History:   Procedure Laterality Date   • BUNIONECTOMY Left 11/26/2018   • DIAGNOSTIC LAPAROSCOPY     • EXCISION URETHRAL DIVERTICULUM FEMALE     • GANGLION CYST EXCISION     • HYSTERECTOMY     • SUBTOTAL HYSTERECTOMY         Family History:   Family History   Problem Relation Age of Onset   • Lung cancer Mother    • Cervical cancer Mother    • Lymphoma Mother         non-Hodgkin's   • Hyperlipidemia Mother    • Hypertension Mother    • Breast cancer Maternal Aunt        Social History:   Social History     Socioeconomic History   • Marital status:      Spouse name: Not on file   • Number of children: Not on file   • Years of education: Not on file   • Highest education level: Not on file   Tobacco Use   • Smoking status: Former Smoker   • Smokeless tobacco: Never Used   Substance and Sexual Activity   • Alcohol use: No   • Drug use: No   • Sexual activity: Yes     Birth control/protection: Surgical     Comment: Hysterectomy       Medications:     Current Outpatient Medications:   •   "azelastine (OPTIVAR) 0.05 % ophthalmic solution, Apply  to eye every 12 (twelve) hours., Disp: , Rfl:   •  B Complex Vitamins (VITAMIN B COMPLEX PO), Take  by mouth., Disp: , Rfl:   •  betamethasone valerate (VALISONE) 0.1 % cream, betamethasone valerate 0.1 % topical cream  As Directed, Disp: , Rfl:   •  buPROPion XL (WELLBUTRIN XL) 300 MG 24 hr tablet, Take 1 tablet by mouth Every Morning., Disp: 90 tablet, Rfl: 3  •  EPIPEN 2-DONNA 0.3 MG/0.3ML solution auto-injector injection, use as directed for ALLERGIC REACTION, Disp: , Rfl: 0  •  fluticasone (FLONASE ALLERGY RELIEF) 50 MCG/ACT nasal spray, Flonase Allergy Relief 50 mcg/actuation nasal spray,suspension  Daily, Disp: , Rfl:   •  Multiple Vitamins-Minerals (AIRBORNE PO), Take  by mouth., Disp: , Rfl:   •  predniSONE (DELTASONE) 20 MG tablet, , Disp: , Rfl:   •  rosuvastatin (CRESTOR) 10 MG tablet, TAKE 1 TABLET NIGHTLY      *[facility] Disp: 90 tablet, Rfl: 1    Allergies:   No Known Allergies    Physical Exam:  Vital Signs:   Vitals:    02/05/20 0948 02/05/20 0954 02/05/20 0955   BP: 110/78 112/78 110/90   BP Location: Left arm Right arm Right arm   Patient Position: Sitting Sitting Standing   Cuff Size: Adult Adult Adult   Pulse: 81     SpO2: 98%     Weight: 65.8 kg (145 lb)     Height: 157.5 cm (62.01\")         Physical Exam   Constitutional: She is oriented to person, place, and time. She appears well-developed and well-nourished. No distress.   HENT:   Head: Normocephalic and atraumatic.   Moist Mucous Membranes.    Eyes: Pupils are equal, round, and reactive to light. EOM are normal. No scleral icterus.   Neck: No tracheal deviation present.   Cardiovascular: Normal rate, regular rhythm, normal heart sounds and intact distal pulses. Exam reveals no gallop and no friction rub.   No murmur heard.  Normal JVD.  No peripheral edema.   Pulmonary/Chest: Effort normal and breath sounds normal. No stridor. No respiratory distress. She has no wheezes. She has no " rales. She exhibits no tenderness.   Abdominal: Soft. Bowel sounds are normal. She exhibits no distension. There is no tenderness. There is no rebound and no guarding.   Musculoskeletal: She exhibits no edema.   Bilateral foot/ankle braces in place.   Lymphadenopathy:     She has no cervical adenopathy.   Neurological: She is alert and oriented to person, place, and time.   Skin: Skin is warm and dry. She is not diaphoretic. No erythema.   Psychiatric: She has a normal mood and affect. Her behavior is normal.       Results Review:   I reviewed the patient's new clinical results.  I personally viewed and interpreted the patient's EKG/Telemetry data      ECG 12 Lead  Date/Time: 2/5/2020 10:47 AM  Performed by: Oj Sunshine MD  Authorized by: Oj Sunshine MD   Comparison: not compared with previous ECG   Rhythm: sinus rhythm  Rate: normal  Conduction: conduction normal  ST Segments: ST segments normal  T Waves: T waves normal  QRS axis: normal    Clinical impression: normal ECG            Assessment / Plan:     1.  Palpitations  --History of intermittent palpitations, somewhat suggestive of symptomatic ectopy  --No symptoms to suggest sustained arrhythmias, no syncopal episodes  --ECG today shows normal sinus rhythm with normal atrioventricular conduction  --Recent 24-hour Holter monitor showed rare supraventricular and ventricular ectopy, no correlation of symptoms however the patient reports she was unable to log her symptoms while she was at work  --Will proceed with evaluation of exertional dyspnea and possible ischemia, as below and consider initiation of beta-blocker or CCB for suppression of ectopy pending results of the below cardiac testing    2.  Exertional dyspnea  --Recent history of exertional dyspnea, present since at least 10/19  --Functional status currently limited given orthopedic issues  --ECG does not show acute or prior ischemic changes  --Will obtain baseline echocardiogram to rule out  underlying structural abnormality or valvulopathy  --Given the possibility of exertional dyspnea being an anginal equivalent in the setting of cardiovascular risk factors, will obtain pharmacologic MPS (inability to exercise) to further rule out ischemia    3.  Mixed hyperlipidemia  --On statin therapy        Preventative Cardiology:   Tobacco Cessation: Prior cessation  Obstructive Sleep Apnea Screening: N/A  AAA Screening: N/A  Peripheral Arterial Disease Screening: N/A    Follow Up:   Return in about 4 weeks (around 3/4/2020).      Thank you for allowing me to participate in the care of your patient. Please to not hesitate to contact me with additional questions or concerns.     LATESHA Sunshine MD  Interventional Cardiology   02/05/2020  10:07 AM

## 2020-02-06 ENCOUNTER — APPOINTMENT (OUTPATIENT)
Dept: LAB | Facility: HOSPITAL | Age: 55
End: 2020-02-06

## 2020-02-06 ENCOUNTER — CLINICAL SUPPORT (OUTPATIENT)
Dept: GENETICS | Facility: HOSPITAL | Age: 55
End: 2020-02-06

## 2020-02-06 DIAGNOSIS — Z13.79 GENETIC TESTING: Primary | ICD-10-CM

## 2020-02-06 DIAGNOSIS — Z80.3 FAMILY HISTORY OF BREAST CANCER: ICD-10-CM

## 2020-02-06 DIAGNOSIS — N60.92 ATYPICAL LOBULAR HYPERPLASIA (ALH) OF LEFT BREAST: ICD-10-CM

## 2020-02-06 DIAGNOSIS — Z13.79 ENCOUNTER FOR GENETIC SCREENING: Primary | ICD-10-CM

## 2020-02-06 PROCEDURE — 96040: CPT | Performed by: GENETIC COUNSELOR, MS

## 2020-02-07 NOTE — PROGRESS NOTES
Maddi Betancur, a 54-year-old female, was referred for genetic counseling due to a personal history of atypical lobular hyperplasia (ALH).  Ms. Betancur recently had a bilateral breast reduction that identified atypical lobular hyperplasia on pathology. Ms. Betancur has a personal history of cervical cancer diagnosed in her 20s. She had a partial hysterectomy at age 45; she retains her ovaries. Her current cancer screening includes annual mammogram and annual clinical breast exam. Ms. Betancur was 8 years old at menarche and had her first child at age 28.  She was interested in discussing her risk to develop breast cancer and the likelihood for a hereditary cancer syndrome.  Ms. Betancur opted to pursue comprehensive testing via the CancerNext panel ordered through [employer] that analyzes BRCA1/2 and 32 additional genes that are associated with increased risk for cancer.  Results are expected in 2-3 weeks.     PERTINENT FAMILY HISTORY: (See attached pedigree)   Sister:     Cervical cancer  Mother:    Non-Hodgkin Lymphoma, 70s; lung cancer, 70s  Mat. Aunt:    Breast cancer, 50  Mat. Grandmother:   Lung cancer  Mat. 1st cousin once removed: Colon cancer, 60s  Several maternal great aunts and uncles reported to have cancer (unspecified cancer types).     We do not have medical records regarding any of these diagnoses.      RISK ASSESSMENT:  Ms. Betancur’s family history of breast cancer led to concern for a hereditary cancer syndrome. She meets NCCN guidelines criteria for BRCA1/2 testing based on her family history of breast cancer. In cases where an affected relative is not available for testing or not willing to pursue testing, it is appropriate to offer testing to an unaffected individual. We also discussed her risk based on the identified ALH. Risk models show her lifetime breast cancer risk is up to 30.7% (CARLI/Tyrer-Uzielzick) based on her family history and personal history of ALH. Based on this  information, regardless of her genetic test results, she would qualify for increased breast screening moving forward.  Increased screening is recommended for anyone who has a lifetime breast cancer risk greater than 20%.  This risk assessment is based on the family history information provided at the time of the appointment.  The assessment could change in the future should new information be obtained.    GENETIC COUNSELING (30 minutes):  Cases of cancer follow three general patterns: sporadic, familial, and hereditary.  While most cancer is sporadic, some cases appear to occur in family clusters.  These cases are said to be familial and account for 10-20% of breast cancer cases.  Familial cases may be due to a combination of shared genes and environmental factors among family members.  In even fewer families, the cancer is said to be inherited, and the genes responsible for the cancer are known.      The pedigree patterns observed in sporadic versus hereditary cancer families were reviewed.  Family histories typical of hereditary cancer syndromes usually include multiple first- and second-degree relatives diagnosed with cancer types that define a syndrome.  These cases tend to be diagnosed at younger- than-expected ages and can be bilateral or multifocal.  The cancer in these families follows an autosomal dominant inheritance pattern, which indicates the likely presence of a mutation in a cancer susceptibility gene.  Children and siblings of an individual known to carry this mutation have a 50% chance of inheriting that mutation, thereby inheriting the increased risk to develop cancer.    Hereditary breast cancer accounts for 5-10% of all cases of breast and ovarian cancer.  A significant proportion of hereditary breast and ovarian cancer can be attributed to mutations in the BRCA1 and BRCA2 genes.  Mutations in these genes confer an increased risk for breast cancer, ovarian cancer, male breast cancer, prostate  cancer, and pancreatic cancer.  Women with a BRCA1 or BRCA2 mutation have up to an 87% lifetime risk of breast cancer, and up to a 44% lifetime risk of ovarian cancer.     There are other, more rare, hereditary cancer syndromes. Some of these genes have well defined cancer risks and established management guidelines.  Other genes that can be tested for have been more recently described, and there may be less data regarding the risks and therefore may not have established management guidelines.  We discussed these limitations at length. Based on Ms. Betancur’s family history and her desire to get more information regarding her personal risks, she opted to pursue testing through a panel evaluating several other genes known to increase the risk for breast cancer.    GENETIC TESTING:  The risks, benefits and limitations of genetic testing and implications for clinical management following testing were reviewed.  DNA test results can influence decisions regarding screening and prevention.  Genetic testing can have significant psychological implications for both individuals and families. Also discussed was the possibility of employment and insurance discrimination based on genetic test results and the laws in place to prevent this, as well as the limitations of these laws.      We discussed panel testing, which would involve testing for BRCA1/2 as well as several other genes (other genes include APC, DOLORES, BARD1, BMPR1A, BRCA1, BRCA2, BRIP1, CDH1, CDK4, CDKN2A, CHEK2, DICER1, EPCAM, GREM1, HOXB13, MLH1, MRE11A, MSH2, MSH6, MUTYH, NBN, NF1, PALB2, PMS2, POLD1, POLE, PTEN, RAD50, RAD51C, RAD51D, SMAD4, SMARCA4, STK11, and TP53). The benefits and limitations of genetic testing were discussed and Ms. Betancur decided to pursue testing via the panel. The implications of a positive or negative test result were discussed. We discussed the importance of testing on an affected relative. In general, a negative genetic test result is  most informative if a mutation has first been established in an affected member of the family.  In cases where an affected individual is not available or interested in testing, it is appropriate to offer testing to an unaffected individual. We discussed the possibility that, in some cases, genetic test results may be informative or may be ambiguous due to the identification of a genetic variant. These variants may or may not be associated with an increased cancer risk.  With multigene panel testing, it is not uncommon for a variant of uncertain significance (VUS) to be identified.  If a VUS is identified, testing family members is typically not recommended and screening recommendations are made based on the family history.  The laboratories that perform genetic testing work to reclassify the VUS and send out an amended report if and when a VUS is reclassified.  The majority of variant findings are ultimately reclassified to a negative result. Given her family history, a negative test result does not eliminate all cancer risk, although the risk would not be as high as it would with positive genetic testing.     PLAN: Genetic testing via the [employer] panel was ordered and results are expected in 2-3 weeks. We will contact Ms. Betancur with her results once they are received.      Leatha Luo MS, [facility], [facility]  Licensed Certified Genetic Counselor

## 2020-02-14 DIAGNOSIS — M81.0 OSTEOPOROSIS OF LUMBAR SPINE: Primary | ICD-10-CM

## 2020-02-24 ENCOUNTER — HOSPITAL ENCOUNTER (OUTPATIENT)
Dept: NUCLEAR MEDICINE | Facility: HOSPITAL | Age: 55
Discharge: HOME | End: 2020-02-24

## 2020-02-24 DIAGNOSIS — R06.09 EXERTIONAL DYSPNEA: ICD-10-CM

## 2020-02-24 DIAGNOSIS — R00.2 PALPITATIONS: ICD-10-CM

## 2020-02-24 LAB
BH CV STRESS COMMENTS STAGE 1: NORMAL
BH CV STRESS DOSE REGADENOSON STAGE 1: 0.4
BH CV STRESS DURATION MIN STAGE 1: 0
BH CV STRESS DURATION SEC STAGE 1: 10
BH CV STRESS PROTOCOL 1: NORMAL
BH CV STRESS RECOVERY BP: NORMAL MMHG
BH CV STRESS RECOVERY HR: 86 BPM
BH CV STRESS STAGE 1: 1
LV EF NUC BP: 63 %
MAXIMAL PREDICTED HEART RATE: 165 BPM
PERCENT MAX PREDICTED HR: 68.48 %
STRESS BASELINE BP: NORMAL MMHG
STRESS BASELINE HR: 75 BPM
STRESS PERCENT HR: 81 %
STRESS POST PEAK BP: NORMAL MMHG
STRESS POST PEAK HR: 113 BPM
STRESS TARGET HR: 140 BPM

## 2020-02-24 PROCEDURE — 78452 HT MUSCLE IMAGE SPECT MULT: CPT | Performed by: INTERNAL MEDICINE

## 2020-02-24 PROCEDURE — 78452 HT MUSCLE IMAGE SPECT MULT: CPT

## 2020-02-24 PROCEDURE — A9500 TC99M SESTAMIBI: HCPCS | Performed by: INTERNAL MEDICINE

## 2020-02-24 PROCEDURE — 0 TECHNETIUM SESTAMIBI: Performed by: INTERNAL MEDICINE

## 2020-02-24 PROCEDURE — 93018 CV STRESS TEST I&R ONLY: CPT | Performed by: INTERNAL MEDICINE

## 2020-02-24 PROCEDURE — 25010000002 REGADENOSON 0.4 MG/5ML SOLUTION: Performed by: INTERNAL MEDICINE

## 2020-02-24 PROCEDURE — 93017 CV STRESS TEST TRACING ONLY: CPT

## 2020-02-24 RX ADMIN — TECHNETIUM TC 99M SESTAMIBI 1 DOSE: 1 INJECTION INTRAVENOUS at 08:50

## 2020-02-24 RX ADMIN — TECHNETIUM TC 99M SESTAMIBI 1 DOSE: 1 INJECTION INTRAVENOUS at 07:20

## 2020-02-24 RX ADMIN — REGADENOSON 0.4 MG: 0.08 INJECTION, SOLUTION INTRAVENOUS at 08:50

## 2020-02-25 ENCOUNTER — PRIOR AUTHORIZATION (OUTPATIENT)
Dept: INTERNAL MEDICINE | Facility: CLINIC | Age: 55
End: 2020-02-25

## 2020-02-27 ENCOUNTER — DOCUMENTATION (OUTPATIENT)
Dept: GENETICS | Facility: HOSPITAL | Age: 55
End: 2020-02-27

## 2020-02-27 ENCOUNTER — TRANSCRIBE ORDERS (OUTPATIENT)
Dept: MAMMOGRAPHY | Facility: HOSPITAL | Age: 55
End: 2020-02-27

## 2020-02-27 DIAGNOSIS — N60.99 BENIGN MAMMARY DYSPLASIA, UNSPECIFIED LATERALITY: Primary | ICD-10-CM

## 2020-02-27 DIAGNOSIS — M81.0 OSTEOPOROSIS OF LUMBAR SPINE: ICD-10-CM

## 2020-03-03 ENCOUNTER — OFFICE VISIT (OUTPATIENT)
Dept: OBSTETRICS AND GYNECOLOGY | Facility: CLINIC | Age: 55
End: 2020-03-03

## 2020-03-03 VITALS
WEIGHT: 138 LBS | SYSTOLIC BLOOD PRESSURE: 102 MMHG | HEIGHT: 62 IN | DIASTOLIC BLOOD PRESSURE: 78 MMHG | BODY MASS INDEX: 25.4 KG/M2

## 2020-03-03 DIAGNOSIS — Z01.419 ENCOUNTER FOR GYNECOLOGICAL EXAMINATION WITHOUT ABNORMAL FINDING: Primary | ICD-10-CM

## 2020-03-03 DIAGNOSIS — Z12.72 VAGINAL PAP SMEAR: ICD-10-CM

## 2020-03-03 PROCEDURE — 99396 PREV VISIT EST AGE 40-64: CPT | Performed by: PHYSICIAN ASSISTANT

## 2020-03-03 NOTE — PROGRESS NOTES
Subjective   Chief Complaint   Patient presents with   • Gynecologic Exam     Last pap done 18-WNL, MMG done 10/3/19-WNL.  Patient dx with ALH on left breast       Maddi Betancur is a 54 y.o. year old  presenting to be seen for her annual gynecological exam.   Patient has no GYN complaints or concerns.  She has had previous hysterectomy but retains both ovaries.  She had a last mammogram 2019.  She underwent breast reduction in 2019 and some tissue returned showing atypical lymphatic hyperplasia.  The patient now has been referred to general surgeon for further monitoring of this.  She has another mammogram scheduled in May and she has an appointment with the  as well.  The patient had a DEXA scan in February by her PCP.  She was found to have osteoporosis and is awaiting Prolia approval.    Past Medical History:   Diagnosis Date   • Acid reflux    • Anemia    • Asthma    • BMI 34.0-34.9,adult    • Cancer (CMS/[facility])     Cervical   • Depression         Current Outpatient Medications:   •  azelastine (OPTIVAR) 0.05 % ophthalmic solution, Apply  to eye every 12 (twelve) hours., Disp: , Rfl:   •  B Complex Vitamins (VITAMIN B COMPLEX PO), Take  by mouth., Disp: , Rfl:   •  betamethasone valerate (VALISONE) 0.1 % cream, betamethasone valerate 0.1 % topical cream  As Directed, Disp: , Rfl:   •  buPROPion XL (WELLBUTRIN XL) 300 MG 24 hr tablet, Take 1 tablet by mouth Every Morning., Disp: 90 tablet, Rfl: 3  •  denosumab (PROLIA) 60 MG/ML solution prefilled syringe syringe, Inject 1 mL under the skin into the appropriate area as directed Every 6 (Six) Months., Disp: 1 mL, Rfl: 1  •  EPIPEN 2-DONNA 0.3 MG/0.3ML solution auto-injector injection, use as directed for ALLERGIC REACTION, Disp: , Rfl: 0  •  fluticasone (FLONASE ALLERGY RELIEF) 50 MCG/ACT nasal spray, Flonase Allergy Relief 50 mcg/actuation nasal spray,suspension  Daily, Disp: , Rfl:   •  Multiple Vitamins-Minerals (AIRBORNE  "PO), Take  by mouth., Disp: , Rfl:   •  rosuvastatin (CRESTOR) 10 MG tablet, TAKE 1 TABLET NIGHTLY      *[facility]*, Disp: 90 tablet, Rfl: 1   No Known Allergies   Past Surgical History:   Procedure Laterality Date   • BILATERAL BREAST REDUCTION  11/04/2019   • BUNIONECTOMY Left 11/26/2018   • DIAGNOSTIC LAPAROSCOPY     • EXCISION URETHRAL DIVERTICULUM FEMALE     • GANGLION CYST EXCISION     • HYSTERECTOMY     • SUBTOTAL HYSTERECTOMY        Social History     Socioeconomic History   • Marital status:      Spouse name: Not on file   • Number of children: Not on file   • Years of education: Not on file   • Highest education level: Not on file   Tobacco Use   • Smoking status: Former Smoker   • Smokeless tobacco: Never Used   Substance and Sexual Activity   • Alcohol use: No   • Drug use: No   • Sexual activity: Yes     Birth control/protection: Surgical     Comment: Hysterectomy      Family History   Problem Relation Age of Onset   • Lung cancer Mother    • Cervical cancer Mother    • Lymphoma Mother         non-Hodgkin's   • Hyperlipidemia Mother    • Hypertension Mother    • Breast cancer Maternal Aunt        Review of Systems   Constitutional: Negative for chills, diaphoresis and fever.   Gastrointestinal: Negative for abdominal pain, constipation, nausea and vomiting.   Genitourinary: Negative for difficulty urinating, dysuria, pelvic pain, vaginal bleeding and vaginal discharge.   All other systems reviewed and are negative.          Objective   /78   Ht 157.5 cm (62\")   Wt 62.6 kg (138 lb)   Breastfeeding No   BMI 25.24 kg/m²     Physical Exam   Constitutional: She appears well-developed and well-nourished. She is cooperative. No distress.   Eyes: Conjunctivae, EOM and lids are normal.   Pulmonary/Chest: Right breast exhibits no inverted nipple, no mass, no nipple discharge, no skin change and no tenderness. Left breast exhibits no inverted nipple, no mass, no nipple discharge, no skin change and " no tenderness.   Reduction mammoplasty scars healing well   Abdominal: Soft. Normal appearance. There is no tenderness. There is no rigidity and no guarding.   Genitourinary: There is no tenderness or lesion on the right labia. There is no tenderness or lesion on the left labia. Right adnexum displays no mass and no tenderness. Left adnexum displays no mass and no tenderness. No erythema or tenderness in the vagina. No vaginal discharge found.   Genitourinary Comments: Pap done   Neurological: She is alert.   Skin: Skin is warm and dry. No lesion and no rash noted.   Psychiatric: She has a normal mood and affect. Her behavior is normal. Thought content normal.            Assessment and Plan  Maddi was seen today for gynecologic exam.    Diagnoses and all orders for this visit:    Encounter for gynecological examination without abnormal finding    Vaginal Pap smear  -     Liquid-based Pap Smear, Screening; Future      Patient Instructions   Self breast exam monthly  Annual mammogram  Calcium 1200 mg daily and vit D 2000 mg daily  Regular excercise             This note was electronically signed.    Ilsa Campo PA-C   March 3, 2020   detailed exam

## 2020-03-05 ENCOUNTER — OFFICE VISIT (OUTPATIENT)
Dept: CARDIOLOGY | Facility: CLINIC | Age: 55
End: 2020-03-05

## 2020-03-05 VITALS
WEIGHT: 138 LBS | SYSTOLIC BLOOD PRESSURE: 132 MMHG | HEIGHT: 62 IN | DIASTOLIC BLOOD PRESSURE: 86 MMHG | BODY MASS INDEX: 25.4 KG/M2 | OXYGEN SATURATION: 99 % | HEART RATE: 72 BPM

## 2020-03-05 DIAGNOSIS — E78.2 MIXED HYPERLIPIDEMIA: ICD-10-CM

## 2020-03-05 DIAGNOSIS — R00.2 PALPITATIONS: Primary | ICD-10-CM

## 2020-03-05 DIAGNOSIS — R06.09 EXERTIONAL DYSPNEA: ICD-10-CM

## 2020-03-05 PROCEDURE — 99213 OFFICE O/P EST LOW 20 MIN: CPT | Performed by: INTERNAL MEDICINE

## 2020-03-05 RX ORDER — CHOLECALCIFEROL (VITAMIN D3) 125 MCG
CAPSULE ORAL
COMMUNITY
Start: 2020-02-08 | End: 2021-02-06 | Stop reason: CLARIF

## 2020-03-05 NOTE — PROGRESS NOTES
"             [facility] Cardiology Office Follow Up Note    Maddi Betancur  [id number]  2020    Primary Care Provider: Kevin Billings MD    Chief Complaint: Follow-up for palpitations and exertional dyspnea    History of Present Illness:   Mrs. Maddi Betancur is a 54 y.o. female who presents to the Cardiology Clinic for follow-up of palpitations and exertional dyspnea.  The patient has a past medical history significant for hyperlipidemia, asthma, and a strong family history significant of CAD.  She also has bilateral lower extremity orthopedic issues, for which she has been in bilateral foot braces for approximately the past 2 years.  She initially presented the cardiology clinic for evaluation of palpitations and exertional dyspnea.  A subsequent pharmacologic MPS showed no evidence of inducible ischemia.  She also had an echocardiogram, which showed normal left ventricular systolic function and no significant valvular abnormalities.  She also wore a 24-hour outpatient Holter monitor, which showed predominantly sinus rhythm with rare supraventricular and ventricular ectopy with no correlation of symptoms.  She presents today for follow-up after cardiac testing.  Since her last follow-up, the patient notes continued episodes of palpitations.  The palpitations may last several minutes to several hours in duration.  She describes the palpitations as a \"fluttering\" sensation.  No associated dizziness, lightheadedness, or syncope.  She also notes persistent mild exertional dyspnea.  She denies any recent escalation in her dyspnea.  At this point, she reports her palpitations and dyspnea do not interfere with her daily activities.  She denies orthopnea, PND, or lower extremity swelling.  No other specific complaints at this time.      Past Cardiac Testin. Last Coronary Angio: None  2. Prior Stress Testin2020   1. Normal pharmacologic MPS with no evidence of inducible ischemia. "    2. Normal LV systolic function, LVEF 63%.   3. Last Echo: 2/17/2020   1.  Normal left ventricular size and systolic function, LVEF 55-60%.   2.  Normal LV diastolic filling pattern.   3.  Normal right ventricular size and systolic function.   4.  Normal left and right atrial size.   5.  Trace MR and TR.  4. Prior Holter Monitor:  24-hour Holter monitor 1/30/2020              1.  The predominant rhythm is sinus rhythm              2.  Rare supraventricular and ventricular ectopy              3.  No sustained arrhythmias    Review of Systems:   Review of Systems   Constitutional: Negative for activity change, appetite change, chills, diaphoresis, fatigue, fever, unexpected weight gain and unexpected weight loss.   Respiratory: Positive for shortness of breath. Negative for cough, chest tightness and wheezing.    Cardiovascular: Positive for palpitations. Negative for chest pain and leg swelling.   Gastrointestinal: Negative for abdominal pain, anal bleeding, blood in stool and GERD.   Endocrine: Negative for cold intolerance and heat intolerance.   Genitourinary: Negative for hematuria.   Musculoskeletal: Positive for arthralgias.   Neurological: Negative for dizziness, syncope, weakness and light-headedness.   Hematological: Does not bruise/bleed easily.   Psychiatric/Behavioral: Negative for depressed mood and stress. The patient is not nervous/anxious.        I have reviewed and/or updated the patient's past medical, past surgical, family, social history, problem list and allergies as appropriate.     Medications:     Current Outpatient Medications:   •  azelastine (OPTIVAR) 0.05 % ophthalmic solution, Apply  to eye every 12 (twelve) hours., Disp: , Rfl:   •  B Complex Vitamins (VITAMIN B COMPLEX PO), Take  by mouth., Disp: , Rfl:   •  betamethasone valerate (VALISONE) 0.1 % cream, betamethasone valerate 0.1 % topical cream  As Directed, Disp: , Rfl:   •  buPROPion XL (WELLBUTRIN XL) 300 MG 24 hr tablet, Take 1  "tablet by mouth Every Morning., Disp: 90 tablet, Rfl: 3  •  Calcium Carbonate-Vitamin D (TGT CALCIUM DIETARY SUPPLEMENT PO), , Disp: , Rfl:   •  Cholecalciferol (VITAMIN D) 125 MCG (5000 UT) capsule capsule, , Disp: , Rfl:   •  denosumab (PROLIA) 60 MG/ML solution prefilled syringe syringe, Inject 1 mL under the skin into the appropriate area as directed Every 6 (Six) Months., Disp: 1 mL, Rfl: 1  •  EPIPEN 2-DONNA 0.3 MG/0.3ML solution auto-injector injection, use as directed for ALLERGIC REACTION, Disp: , Rfl: 0  •  fluticasone (FLONASE ALLERGY RELIEF) 50 MCG/ACT nasal spray, Flonase Allergy Relief 50 mcg/actuation nasal spray,suspension  Daily, Disp: , Rfl:   •  Multiple Vitamins-Minerals (AIRBORNE PO), Take  by mouth., Disp: , Rfl:   •  rosuvastatin (CRESTOR) 10 MG tablet, TAKE 1 TABLET NIGHTLY      *SLIMENMARK*, Disp: 90 tablet, Rfl: 1    Physical Exam:  Vital Signs:   Vitals:    03/05/20 1444   BP: 132/86   BP Location: Right arm   Patient Position: Sitting   Cuff Size: Adult   Pulse: 72   SpO2: 99%   Weight: 62.6 kg (138 lb)   Height: 157.5 cm (62\")       Physical Exam   Constitutional: She is oriented to person, place, and time. She appears well-developed and well-nourished. No distress.   HENT:   Head: Normocephalic and atraumatic.   Moist Mucous Membranes.    Eyes: Pupils are equal, round, and reactive to light. EOM are normal. No scleral icterus.   Neck: No tracheal deviation present.   Cardiovascular: Normal rate, regular rhythm, normal heart sounds and intact distal pulses. Exam reveals no gallop and no friction rub.   No murmur heard.  Normal JVD.  No peripheral edema.  No significant murmur.   Pulmonary/Chest: Effort normal and breath sounds normal. No stridor. No respiratory distress. She has no wheezes. She has no rales. She exhibits no tenderness.   Abdominal: Soft. Bowel sounds are normal. She exhibits no distension. There is no tenderness. There is no rebound and no guarding.   Musculoskeletal: She " exhibits no edema.   Bilateral lower extremity braces in place.   Lymphadenopathy:     She has no cervical adenopathy.   Neurological: She is alert and oriented to person, place, and time.   Skin: Skin is warm and dry. She is not diaphoretic. No erythema.   Psychiatric: She has a normal mood and affect. Her behavior is normal.       Results Review:   I reviewed the patient's new clinical results.        Assessment / Plan:     1.  Palpitations  --Initially presented for evaluation of palpitations suggestive of symptomatic ectopy  --ECG showed normal sinus rhythm with normal atrioventricular conduction  --24-hour Holter monitor showed rare supraventricular and ventricular ectopy, no correlation of symptoms however the patient reports she was unable to log her symptoms while she was at work  --Echocardiogram showed normal LV systolic function with no underlying structural or valvular abnormalities  --Suspect a component of the patient's palpitations may be related to symptomatic ectopy.  Discussed suppression with initiation of low-dose bisoprolol.  Given the patient is only mildly symptomatic, she wishes to defer starting additional medications at this time  --Will follow-up on a PRN basis, consider suppression should palpitations worsen     2.  Exertional dyspnea  --Continues to report mild exertional dyspnea  --Echocardiogram shows normal left ventricular systolic function, normal diastolic function, no significant valvular abnormalities to explain dyspnea  --Pharmacologic MPS with no evidence of inducible ischemia  --No evidence of paroxysmal arrhythmias on outpatient cardiac monitoring  --At this point, suspect exertional dyspnea noncardiac in etiology, possibly secondary to deconditioning in the setting of chronic orthopedic issues  --No indication for further cardiac evaluation     3.  Mixed hyperlipidemia  --On statin therapy      Preventative Cardiology:   Tobacco Cessation: Prior cessation  Obstructive Sleep  Apnea Screening: Deffered  AAA Screening: N/A  Peripheral Arterial Disease Screening: N/A    Follow Up:   Return if symptoms worsen or fail to improve.      Thank you for allowing me to participate in the care of your patient. Please to not hesitate to contact me with additional questions or concerns.     LATESHA Sunshine MD  Interventional Cardiology   03/05/2020  2:55 PM

## 2020-03-09 DIAGNOSIS — Z12.72 VAGINAL PAP SMEAR: ICD-10-CM

## 2020-03-09 RX ORDER — FLUCONAZOLE 150 MG/1
TABLET ORAL
Qty: 2 TABLET | Refills: 0 | Status: SHIPPED | OUTPATIENT
Start: 2020-03-09 | End: 2020-07-08

## 2020-03-13 ENCOUNTER — CLINICAL SUPPORT (OUTPATIENT)
Dept: INTERNAL MEDICINE | Facility: CLINIC | Age: 55
End: 2020-03-13

## 2020-03-13 DIAGNOSIS — M81.0 OSTEOPOROSIS OF LUMBAR SPINE: Primary | ICD-10-CM

## 2020-03-13 PROCEDURE — 96372 THER/PROPH/DIAG INJ SC/IM: CPT | Performed by: PHYSICIAN ASSISTANT

## 2020-05-01 ENCOUNTER — HOSPITAL ENCOUNTER (OUTPATIENT)
Dept: ULTRASOUND IMAGING | Facility: HOSPITAL | Age: 55
Discharge: HOME | End: 2020-05-01

## 2020-05-01 ENCOUNTER — HOSPITAL ENCOUNTER (OUTPATIENT)
Dept: MAMMOGRAPHY | Facility: HOSPITAL | Age: 55
Discharge: HOME | End: 2020-05-01
Admitting: SURGERY

## 2020-05-01 DIAGNOSIS — N60.99 BENIGN MAMMARY DYSPLASIA, UNSPECIFIED LATERALITY: ICD-10-CM

## 2020-05-01 PROCEDURE — 76642 ULTRASOUND BREAST LIMITED: CPT | Performed by: RADIOLOGY

## 2020-05-01 PROCEDURE — 77062 BREAST TOMOSYNTHESIS BI: CPT | Performed by: RADIOLOGY

## 2020-05-01 PROCEDURE — 77066 DX MAMMO INCL CAD BI: CPT

## 2020-05-01 PROCEDURE — G0279 TOMOSYNTHESIS, MAMMO: HCPCS

## 2020-05-01 PROCEDURE — 77066 DX MAMMO INCL CAD BI: CPT | Performed by: RADIOLOGY

## 2020-05-01 PROCEDURE — 76642 ULTRASOUND BREAST LIMITED: CPT

## 2020-07-06 RX ORDER — ROSUVASTATIN CALCIUM 10 MG/1
TABLET, COATED ORAL
Qty: 90 TABLET | Refills: 1 | Status: SHIPPED | OUTPATIENT
Start: 2020-07-06 | End: 2020-12-28

## 2020-07-08 ENCOUNTER — OFFICE VISIT (OUTPATIENT)
Dept: GYNECOLOGIC ONCOLOGY | Facility: CLINIC | Age: 55
End: 2020-07-08

## 2020-07-08 VITALS
OXYGEN SATURATION: 99 % | SYSTOLIC BLOOD PRESSURE: 134 MMHG | TEMPERATURE: 97.7 F | HEIGHT: 62 IN | HEART RATE: 74 BPM | RESPIRATION RATE: 16 BRPM | WEIGHT: 145 LBS | BODY MASS INDEX: 26.68 KG/M2 | DIASTOLIC BLOOD PRESSURE: 72 MMHG

## 2020-07-08 DIAGNOSIS — N60.92 ATYPICAL LOBULAR HYPERPLASIA (ALH) OF LEFT BREAST: ICD-10-CM

## 2020-07-08 DIAGNOSIS — Z91.89 AT HIGH RISK FOR BREAST CANCER: Primary | ICD-10-CM

## 2020-07-08 PROCEDURE — 99204 OFFICE O/P NEW MOD 45 MIN: CPT | Performed by: NURSE PRACTITIONER

## 2020-07-08 NOTE — PROGRESS NOTES
GYN ONCOLOGY HIGH RISK CLINIC    Maddi Betancur  [id number]  1965    Chief Complaint:   Chief Complaint   Patient presents with   • Establish Care     high risk breast 30.7%, ALH       HISTORY OF PRESENT ILLNESS:    Maddi Betancur is a 54 y.o. year old female here today for her initial high risk visit. Patient underwent bilateral breast reduction in 2019 after a 70 lb weight loss through diet and exercise. Pathology from her reduction revealed atypical lobular hyperplasia (ALH). Patient had a negative screening mammogram only 1 month prior to this surgery. This and her family cancer history prompted her to undergo genetic counseling and testing. Genetic testing was negative for pathogenic mutations in all genes, including BRCA 1/2, via the CancerNext panel. Computer risk-modeling was then used to assess her personal risk. Results showed patient has a lifetime risk of breast cancer up to 30.7% by CARLI/Stephanie. Patient has also undergone consultation with Dr. MIRELES and has decided to pursue high risk management.     Patient is a [id number], underwent menarche at age 8, and had her first child at age 28. She has a personal history of very early cervical cancer in her 20's (prior to having her children) that was treated conservatively to maintain fertility. She did not require chemotherapy or radiation. She notes all pap smears since that time have been negative. Last pap and annual exam by IVAN Bang on 3/3/2020. Patient used OCPs in her early 20's for management of abnormal menses and then underwent fertility treatments prior to conceiving her first child. She has not used any hormonal medications after age 29. She underwent hysterectomy with ovarian preservation at age 45. She does not use HRT, has occasional tolerable vasomotor symptoms x 10 years.     Patient performs regular home self breast exams, feels she is healing well from bilateral breast reduction. Last mammogram was diagnostic study with  left breast ultrasound in 5/2020 for follow-up from [facility]. Results were BiRADS II. She has never had a high risk MRI, but has been recommended by Dr. MIRELES and breast radiologist to begin this now.       Past Medical History:   Diagnosis Date   • Acid reflux    • Anemia    • Asthma    • Atypical lobular hyperplasia (ALH) of breast    • BMI 34.0-34.9,adult    • Cervical cancer (CMS/HCC)     stage 1 or less in early 20's (no chemo or radiation). All pap smears negative since   • Depression        Past Surgical History:   Procedure Laterality Date   • BILATERAL BREAST REDUCTION  11/04/2019   • BUNIONECTOMY Left 11/26/2018   • DIAGNOSTIC LAPAROSCOPY     • EXCISION URETHRAL DIVERTICULUM FEMALE     • GANGLION CYST EXCISION     • HYSTERECTOMY     • REDUCTION MAMMAPLASTY Bilateral     11/2019.    • SUBTOTAL HYSTERECTOMY         Family History   Problem Relation Age of Onset   • Lung cancer Mother    • Cervical cancer Mother    • Lymphoma Mother         non-Hodgkin's   • Hyperlipidemia Mother    • Hypertension Mother    • Breast cancer Maternal Aunt        Health Maintenance:    Regular self breast exam: yes  Mammogram: 5/2020. History of abnormal mammogram: no, ALH incidentally diagnosed upon bilateral breast reduction  Breast MRI: NEVER.     Risk Asessment:   CARLI/Tyrer-Cuzick: 30.7%, h/o ALH    Allergies: No Known Allergies    Medications: Medications have been reviewed in the chart and reconciled.     Review of Systems   Constitutional: Negative for fatigue, fever and unexpected weight change.   HENT: Negative for congestion, hearing loss, sore throat and trouble swallowing.    Eyes: Negative for visual disturbance.   Respiratory: Negative for cough, shortness of breath and wheezing.    Cardiovascular: Negative for chest pain and leg swelling.   Gastrointestinal: Negative for abdominal distention, abdominal pain, constipation, diarrhea, nausea and vomiting.   Endocrine: Negative.    Genitourinary: Negative.   "  Musculoskeletal: Negative for arthralgias, back pain and gait problem.   Skin: Negative.    Allergic/Immunologic: Negative.    Neurological: Negative for dizziness, weakness, numbness and headaches.   Hematological: Negative for adenopathy. Does not bruise/bleed easily.   Psychiatric/Behavioral: Negative.    All other systems reviewed and are negative.        Physical Exam  Vital Signs: /72   Pulse 74   Temp 97.7 °F (36.5 °C) (Temporal)   Resp 16   Ht 156.2 cm (61.5\")   Wt 65.8 kg (145 lb)   SpO2 99%   BMI 26.95 kg/m²   Vitals:    07/08/20 1012   PainSc: 0-No pain           General Appearance:  alert, cooperative, no apparent distress and appears stated age   Neurologic/Psychiatric: A&O x 3, gait steady, appropriate affect   HEENT:  Normocephalic, without obvious abnormality, mucous membranes moist   Neck: Supple, symmetrical, trachea midline, no adenopathy;  No thyromegaly, masses, or tenderness   Back:   Symmetric, no curvature, ROM normal, no CVA tenderness   Lungs:   Clear to auscultation bilaterally; respirations regular, even, and unlabored bilaterally   Heart:  Regular rate and rhythm, no murmurs appreciated   Breasts:  Symmetrical, no masses, no lesions, no nipple discharge and scarring consistent with bilateral breast reduction   Abdomen:   Soft, non-tender, non-distended and no organomegaly   Lymph nodes: No axillary adenopathy noted   Extremities: Normal, atraumatic; no clubbing, cyanosis, or edema    Skin: No rashes, ulcers, or suspicious lesions noted   Pelvic: deferred       Assessment and Plan:    Maddi was seen today for establish care.    Diagnoses and all orders for this visit:    At high risk for breast cancer  -     MRI Breast Bilateral Screening With & Without Contrast; Future    Atypical lobular hyperplasia (ALH) of left breast  -     MRI Breast Bilateral Screening With & Without Contrast; Future        Patient Education:  Reviewed screening schedule related to diagnosis as " follows:  Monthly breast self-exam starting at age 18.  Clinical breast exam every 6 months.  Annual mammogram starting at age 40, or 10 years prior to the earliest diagnosis in the family.   Annual MRI starting at age 40, or 10 years prior to the earliest diagnosis in the family.   Consider chemoprevention for breast cancer risk reduction (tamoxifen/evista).     Discussed the purpose of [facility] clinic in coordinating, scheduling, and planning screening interventions to include yearly mammograms, yearly screening breast MRI, Q6 month CBE, and monthly SBE. Colonoscopy and GYN screening per national guidelines.   Patient sees IVAN Bang for routine well woman exams and gynecologic care.  Reviewed technique for SBE including concerning findings to report.  We can alternate every 6 month clinical breast exams between high risk clinic and regular gyn office.   Discussed yearly screening mammogram. Results will be reported by breast imaging center and a copy of report will be sent to our office for review. Last mammogram diagnostic in 5/2020, repeat in 1 year unless instructed otherwise.   Discussed screening breast MRI scheduling based on menstrual cycles, HRT. We also discussed the increased sensitivity of MRI screening and possibility of call- backs for additional imaging or biopsies to establish baseline. MRI order placed today. If negative, will eventually plan to alternate mammogram and MRI every 6 months.   Discussed vitamin D as benefit to bone health and possible benefit to breast health. Recommended 1000 IU daily unless contraindicated  Discussed benefit of Vitamin E for fibrocystic breasts/breast pain  Discussed tamoxifen/evista use for chemoprevention including risk and benefits of use as well as side effects of medication-pt declines at this time. Additional information regarding both medications and drug interactions reviewed. Potential interaction with Wellbutrin and tamoxifen. Patient and I in  agreement that if she pursues chemoprevention in the future, Evista may be best option for her.   Discussed prophylactic surgeries including bilateral mastectomies. The patient declines surgical intervention at this time,  but the options were presented to her as education and she knows she can request further information or referral for consulation if she desires in the future.       This was a 45 minute face-to-face visit with 35 minutes spent in counseling and coordination of care as documented above.     Return to clinic in 6 months for High Risk visit.      Ivett He, APRN

## 2020-07-27 ENCOUNTER — OFFICE VISIT (OUTPATIENT)
Dept: INTERNAL MEDICINE | Facility: CLINIC | Age: 55
End: 2020-07-27

## 2020-07-27 VITALS
WEIGHT: 147 LBS | DIASTOLIC BLOOD PRESSURE: 77 MMHG | BODY MASS INDEX: 27.05 KG/M2 | TEMPERATURE: 97.4 F | SYSTOLIC BLOOD PRESSURE: 126 MMHG | HEIGHT: 62 IN | HEART RATE: 77 BPM | OXYGEN SATURATION: 99 %

## 2020-07-27 DIAGNOSIS — M25.50 ARTHRALGIA, UNSPECIFIED JOINT: ICD-10-CM

## 2020-07-27 DIAGNOSIS — M81.0 OSTEOPOROSIS OF LUMBAR SPINE: Primary | ICD-10-CM

## 2020-07-27 DIAGNOSIS — T50.905A MEDICATION SIDE EFFECT, INITIAL ENCOUNTER: ICD-10-CM

## 2020-07-27 PROCEDURE — 99213 OFFICE O/P EST LOW 20 MIN: CPT | Performed by: PHYSICIAN ASSISTANT

## 2020-07-27 NOTE — PROGRESS NOTES
Maddi Betancur is a 54 y.o. female.     Subjective   History of Present Illness   Here today with concern of joint pain predominantly in the left elbow and knee.  She reports that she is always had pain in multiple joints that around 2 weeks ago the pain in the left elbow and knee became particularly bothersome.  She has some left knee pain with any activity but it is notably worse with bending or squatting.  After increased activity the left medial knee will be abnormally warm to touch with slight erythema.  The left elbow has some baseline pain at rest and is tender to touch medially.  She has to minimize swinging of her arms when walking as that seems to worsen the pain.  Any lifting, pushing or pulling with the left arm worsens pain as well.  She denies any edema of her joints.  No new injuries.  No previous occurrences with pain to this magnitude.  She also endorses some pain in her right elbow and knee but not nearly as bothersome as the left.  She was given her first Prolia injection in late April or early May.  No other medication changes and she is not immune suppressed.  She has continued calcium and vitamin D supplementation as directed.  She denies any fever, chills, nausea, abdominal pain, weakness or confusion.        The following portions of the patient's history were reviewed and updated as appropriate: allergies, current medications, past family history, past medical history, past social history, past surgical history and problem list.    Review of Systems   Constitutional: Negative for activity change, chills, fatigue and fever.   HENT: Negative.    Eyes: Negative.  Negative for visual disturbance.   Respiratory: Negative for cough, chest tightness, shortness of breath and wheezing.    Cardiovascular: Negative for chest pain, palpitations and leg swelling.   Gastrointestinal: Negative for abdominal pain, constipation, diarrhea, nausea and vomiting.   Endocrine: Negative for polydipsia,  polyphagia and polyuria.   Genitourinary: Negative.    Musculoskeletal: Positive for arthralgias and back pain. Negative for gait problem and joint swelling.   Skin: Positive for color change. Negative for rash and bruise.        Abnormal warmth     Allergic/Immunologic: Positive for environmental allergies. Negative for immunocompromised state.   Neurological: Negative for dizziness, speech difficulty, weakness, numbness, headache and confusion.   Hematological: Negative for adenopathy. Does not bruise/bleed easily.   Psychiatric/Behavioral: Negative for agitation, sleep disturbance and depressed mood (stable). The patient is not nervous/anxious.          Objective    Physical Exam   Constitutional: She is oriented to person, place, and time. She appears well-developed and well-nourished. No distress.   Overweight.    HENT:   Head: Normocephalic and atraumatic.   Eyes: Pupils are equal, round, and reactive to light. Conjunctivae and EOM are normal.   Neck: Normal range of motion. Neck supple.   Cardiovascular: Normal rate, regular rhythm and normal heart sounds. Exam reveals no gallop and no friction rub.   No murmur heard.  Pulmonary/Chest: Effort normal and breath sounds normal. No respiratory distress. She has no wheezes. She has no rales.   Abdominal: Soft. Bowel sounds are normal. She exhibits no mass. There is no tenderness.   Musculoskeletal: She exhibits no edema or deformity.        Left elbow: She exhibits decreased range of motion (pain). She exhibits no swelling, no effusion, no deformity and no laceration. Tenderness found. Medial epicondyle tenderness noted. No lateral epicondyle and no olecranon process tenderness noted.        Left knee: She exhibits normal range of motion, no swelling, no effusion, no ecchymosis, no deformity, no laceration, no erythema, normal alignment and no bony tenderness. No tenderness found.   Lymphadenopathy:     She has no cervical adenopathy.   Neurological: She is alert  "and oriented to person, place, and time. She displays normal reflexes. No cranial nerve deficit or sensory deficit. She exhibits normal muscle tone. Coordination normal.   Skin: Skin is warm and dry. Capillary refill takes less than 2 seconds. No rash noted. She is not diaphoretic. No erythema. No pallor.   Psychiatric: She has a normal mood and affect. Her behavior is normal. Judgment and thought content normal.   Nursing note and vitals reviewed.        /77   Pulse 77   Temp 97.4 °F (36.3 °C)   Ht 156.2 cm (61.5\")   Wt 66.7 kg (147 lb)   SpO2 99%   BMI 27.33 kg/m²     Nursing note and vitals reviewed.          Assessment/Plan   Maddi was seen today for elbow pain.    Diagnoses and all orders for this visit:    Osteoporosis of lumbar spine  -     Comprehensive Metabolic Panel  -     Magnesium  -     Phosphorus  -     Vitamin D 25 Hydroxy  -     CBC & Differential    Medication side effect, initial encounter  -     Comprehensive Metabolic Panel  -     Magnesium  -     Phosphorus  -     Vitamin D 25 Hydroxy  -     CBC & Differential    Arthralgia, unspecified joint  -     Comprehensive Metabolic Panel  -     Magnesium  -     Phosphorus  -     Vitamin D 25 Hydroxy  -     CBC & Differential      Suspect symptoms may be related to side effects from Prolia injection as it has common listed reactions of extremity pain and bone pain.  It has been around 3 months since the initial Prolia injection.  Prolia has been added to her allergy list due to significant side effects discussed today.  She is encouraged to continue calcium and vitamin D supplementation.  An alternative treatment for osteoporosis will be discussed when she returns in January for her annual physical as sufficient time since initial Prolia injection will have passed by then.      Call or return to clinic if symptoms worsen or persist.       IVAN Martel  07/27/2020  7:55 AM  "

## 2020-07-28 LAB
25(OH)D3+25(OH)D2 SERPL-MCNC: 56.7 NG/ML (ref 30–100)
ALBUMIN SERPL-MCNC: 4.7 G/DL (ref 3.5–5.2)
ALBUMIN/GLOB SERPL: 2.5 G/DL
ALP SERPL-CCNC: 59 U/L (ref 39–117)
ALT SERPL-CCNC: 13 U/L (ref 1–33)
AST SERPL-CCNC: 15 U/L (ref 1–32)
BASOPHILS # BLD AUTO: ABNORMAL 10*3/UL
BASOPHILS # BLD MANUAL: 0.08 10*3/MM3 (ref 0–0.2)
BASOPHILS NFR BLD MANUAL: 2 % (ref 0–1.5)
BILIRUB SERPL-MCNC: 0.3 MG/DL (ref 0–1.2)
BUN SERPL-MCNC: 10 MG/DL (ref 6–20)
BUN/CREAT SERPL: 12.3 (ref 7–25)
CALCIUM SERPL-MCNC: 10 MG/DL (ref 8.6–10.5)
CHLORIDE SERPL-SCNC: 104 MMOL/L (ref 98–107)
CO2 SERPL-SCNC: 27.1 MMOL/L (ref 22–29)
CREAT SERPL-MCNC: 0.81 MG/DL (ref 0.57–1)
DIFFERENTIAL COMMENT: ABNORMAL
EOSINOPHIL # BLD AUTO: ABNORMAL 10*3/UL
EOSINOPHIL # BLD MANUAL: 0.2 10*3/MM3 (ref 0–0.4)
EOSINOPHIL NFR BLD AUTO: ABNORMAL %
EOSINOPHIL NFR BLD MANUAL: 5 % (ref 0.3–6.2)
ERYTHROCYTE [DISTWIDTH] IN BLOOD BY AUTOMATED COUNT: 16.4 % (ref 12.3–15.4)
GLOBULIN SER CALC-MCNC: 1.9 GM/DL
GLUCOSE SERPL-MCNC: 98 MG/DL (ref 65–99)
HCT VFR BLD AUTO: 39.6 % (ref 34–46.6)
HGB BLD-MCNC: 12 G/DL (ref 12–15.9)
LYMPHOCYTES # BLD AUTO: ABNORMAL 10*3/UL
LYMPHOCYTES # BLD MANUAL: 1.06 10*3/MM3 (ref 0.7–3.1)
LYMPHOCYTES NFR BLD AUTO: ABNORMAL %
LYMPHOCYTES NFR BLD MANUAL: 26 % (ref 19.6–45.3)
MAGNESIUM SERPL-MCNC: 2.3 MG/DL (ref 1.6–2.6)
MCH RBC QN AUTO: 25.6 PG (ref 26.6–33)
MCHC RBC AUTO-ENTMCNC: 30.3 G/DL (ref 31.5–35.7)
MCV RBC AUTO: 84.6 FL (ref 79–97)
MONOCYTES # BLD MANUAL: 0.28 10*3/MM3 (ref 0.1–0.9)
MONOCYTES NFR BLD AUTO: ABNORMAL %
MONOCYTES NFR BLD MANUAL: 7 % (ref 5–12)
NEUTROPHILS # BLD MANUAL: 2.44 10*3/MM3 (ref 1.7–7)
NEUTROPHILS NFR BLD AUTO: ABNORMAL %
NEUTROPHILS NFR BLD MANUAL: 60 % (ref 42.7–76)
PHOSPHATE SERPL-MCNC: 4.2 MG/DL (ref 2.5–4.5)
PLATELET # BLD AUTO: 182 10*3/MM3 (ref 140–450)
PLATELET BLD QL SMEAR: ABNORMAL
POTASSIUM SERPL-SCNC: 4.4 MMOL/L (ref 3.5–5.2)
PROT SERPL-MCNC: 6.6 G/DL (ref 6–8.5)
RBC # BLD AUTO: 4.68 10*6/MM3 (ref 3.77–5.28)
RBC MORPH BLD: ABNORMAL
SODIUM SERPL-SCNC: 141 MMOL/L (ref 136–145)
WBC # BLD AUTO: 4.06 10*3/MM3 (ref 3.4–10.8)

## 2020-08-11 ENCOUNTER — HOSPITAL ENCOUNTER (OUTPATIENT)
Dept: MRI IMAGING | Facility: HOSPITAL | Age: 55
Discharge: HOME | End: 2020-08-11
Admitting: NURSE PRACTITIONER

## 2020-08-11 DIAGNOSIS — N60.92 ATYPICAL LOBULAR HYPERPLASIA (ALH) OF LEFT BREAST: ICD-10-CM

## 2020-08-11 DIAGNOSIS — Z91.89 AT HIGH RISK FOR BREAST CANCER: ICD-10-CM

## 2020-08-11 PROCEDURE — C8937 CAD BREAST MRI: HCPCS

## 2020-08-11 PROCEDURE — 77049 MRI BREAST C-+ W/CAD BI: CPT | Performed by: RADIOLOGY

## 2020-08-11 PROCEDURE — A9575 INJ GADOTERATE MEGLUMI 0.1ML: HCPCS | Performed by: NURSE PRACTITIONER

## 2020-08-11 PROCEDURE — 25010000002 GADOTERATE MEGLUMINE 7.5 MMOL/15ML SOLUTION: Performed by: NURSE PRACTITIONER

## 2020-08-11 PROCEDURE — 77049 MRI BREAST C-+ W/CAD BI: CPT

## 2020-08-11 RX ORDER — GADOTERATE MEGLUMINE 376.9 MG/ML
13 INJECTION INTRAVENOUS
Status: COMPLETED | OUTPATIENT
Start: 2020-08-11 | End: 2020-08-11

## 2020-08-11 RX ADMIN — GADOTERATE MEGLUMINE 13 ML: 376.9 INJECTION, SOLUTION INTRAVENOUS at 08:50

## 2020-08-14 ENCOUNTER — TELEPHONE (OUTPATIENT)
Dept: GYNECOLOGIC ONCOLOGY | Facility: CLINIC | Age: 55
End: 2020-08-14

## 2020-08-14 DIAGNOSIS — K76.9 LIVER LESION, RIGHT LOBE: Primary | ICD-10-CM

## 2020-08-14 NOTE — TELEPHONE ENCOUNTER
Called patient and notified of breast MRI results, BiRADS 2. However, there was an incidental finding of a right lobe cystic liver lesion 1 cm and follow-up is recommended. I suggested we begin with liver ultrasound. She v/u and agrees. Order placed.    Patient also informed me PCP has discontinued Prolia due to severe joint pain (now listed as allergy) and has suggested Evista. We discussed pharmacology of this drug and that it is often used in the high risk space for breast cancer risk reduction. I feel she may get a dual benefit from the drug and and supportive of this change.   Patient also notes she has a new abnormal skin lesion to back, seeing dermatology on 8/25/2020 for evaluation. Encouraged patient to have a copy of records sent to our office.

## 2020-08-17 ENCOUNTER — TELEPHONE (OUTPATIENT)
Dept: MRI IMAGING | Facility: HOSPITAL | Age: 55
End: 2020-08-17

## 2020-08-17 NOTE — TELEPHONE ENCOUNTER
Called patient with MRI Breast results. Recommended yearly high risk screening. Pt expressed understanding and was encouraged to call with any further questions or concerns.

## 2020-08-28 ENCOUNTER — HOSPITAL ENCOUNTER (OUTPATIENT)
Dept: ULTRASOUND IMAGING | Facility: HOSPITAL | Age: 55
Discharge: HOME | End: 2020-08-28
Admitting: NURSE PRACTITIONER

## 2020-08-28 DIAGNOSIS — K76.9 LIVER LESION, RIGHT LOBE: ICD-10-CM

## 2020-08-28 PROCEDURE — 76705 ECHO EXAM OF ABDOMEN: CPT

## 2020-08-31 ENCOUNTER — TELEPHONE (OUTPATIENT)
Dept: GYNECOLOGIC ONCOLOGY | Facility: CLINIC | Age: 55
End: 2020-08-31

## 2020-08-31 RX ORDER — RALOXIFENE HYDROCHLORIDE 60 MG/1
60 TABLET, FILM COATED ORAL DAILY
Qty: 90 TABLET | Refills: 4 | Status: SHIPPED | OUTPATIENT
Start: 2020-08-31 | End: 2021-08-10 | Stop reason: SDUPTHER

## 2020-08-31 NOTE — TELEPHONE ENCOUNTER
Called patient and notified US liver shows sub cm simple cyst, no other concerns in RUQ. This was a follow-up for the incidental liver cyst seen on high risk breast ultrasound. Patient is pleased.   She is now interested in beginning prescription breast prophylaxis. We have previously discussed that Evista would be the best option for her as she will also get bone health benefits. All pros/cons and potential side effects reviewed. Patient wants to start now. Rx for Evista sent to mail order pharmacy.

## 2020-12-28 RX ORDER — ROSUVASTATIN CALCIUM 10 MG/1
TABLET, COATED ORAL
Qty: 90 TABLET | Refills: 3 | Status: SHIPPED | OUTPATIENT
Start: 2020-12-28 | End: 2021-12-20

## 2021-01-06 ENCOUNTER — OFFICE VISIT (OUTPATIENT)
Dept: GYNECOLOGIC ONCOLOGY | Facility: CLINIC | Age: 56
End: 2021-01-06

## 2021-01-06 VITALS
HEART RATE: 54 BPM | WEIGHT: 132.5 LBS | RESPIRATION RATE: 18 BRPM | TEMPERATURE: 97.8 F | BODY MASS INDEX: 25.02 KG/M2 | DIASTOLIC BLOOD PRESSURE: 71 MMHG | SYSTOLIC BLOOD PRESSURE: 127 MMHG | HEIGHT: 61 IN

## 2021-01-06 DIAGNOSIS — Z91.89 AT HIGH RISK FOR BREAST CANCER: Primary | ICD-10-CM

## 2021-01-06 DIAGNOSIS — L91.0 KELOID SCAR: ICD-10-CM

## 2021-01-06 DIAGNOSIS — N60.92 ATYPICAL LOBULAR HYPERPLASIA (ALH) OF LEFT BREAST: ICD-10-CM

## 2021-01-06 DIAGNOSIS — L90.5 SCAR PAIN: ICD-10-CM

## 2021-01-06 PROCEDURE — 99214 OFFICE O/P EST MOD 30 MIN: CPT | Performed by: NURSE PRACTITIONER

## 2021-01-06 NOTE — PROGRESS NOTES
"GYN ONCOLOGY HIGH RISK CLINIC    Maddi Betancur  [id number]  1965    Subjective   Chief Complaint: Follow-up (high risk breast, c/o pain at breast reduction scars bilaterally)      HISTORY OF PRESENT ILLNESS:       Maddi Betancur is a 55 y.o. year old female here today for her high risk visit. Patient underwent bilateral breast reduction in 11/2019 after a 70 lb weight loss through diet and exercise. Pathology from her reduction revealed atypical lobular hyperplasia (ALH). She has had negative genetic testing and found to have a lifetime risk of breast cancer up to 30.7% by CARLI/Stephanie. Annual exams and routine gynecologic care are with IVAN Bang and currently UTD. She continues to work hard toward her weight loss journey, down another 15 lbs since our last visit. She is happy that her BMI is now within normal range.     Upon arrival patient c/o persistent pain at bilateral lower breast scars. She is over 1 year out from her reduction and ws hoping discomfort would have been resolved by now. She describes sensitivity in the areas during the day and \"burning/tearing\" sensations at night that often wake her up. She is using topical Vitamin E cream with limited benefit. She notes she bothersome scars are both keyloided.   Patient performs regular home self breast exams, denies any new or concerning findings. Last mammogram was diagnostic study with left breast ultrasound in 5/2020 for follow-up from [facility]. Results were BiRADS II. Initial high risk MRI completed in 8/2020, also BiRADS II. She started Evista in 8/2020 and denies any side effects or trouble with the medication, desires to continue.      The current medication list and allergy list were reviewed and reconciled.     Past Medical History, Past Surgical History, Social History, Family History have been reviewed and are without significant changes except as mentioned.      Health Maintenance:    Regular self breast exam: yes  Mammogram: " "5/1/2020. History of abnormal mammogram: no (ALH incidentally found upon path from reduction mammaplasty)  Breast MRI: 8/11/2020. Results: negative    Risk Asessment: Anuradha/Tyrer-Cuzick: 30.7%, h/o ALH      Review of Systems   Constitutional: Negative.    Respiratory: Negative.    Cardiovascular: Negative.    Skin:        Pain at bilateral lower breast scars since breast reduction       Objective   Physical Exam  Vital Signs: /71   Pulse 54   Temp 97.8 °F (36.6 °C) (Temporal)   Resp 18   Ht 156.2 cm (61.5\")   Wt 60.1 kg (132 lb 8 oz)   BMI 24.63 kg/m²    Wt Readings from Last 3 Encounters:   01/06/21 0938 60.1 kg (132 lb 8 oz)   07/27/20 0805 66.7 kg (147 lb)   07/08/20 1012 65.8 kg (145 lb)       Vitals:    01/06/21 0938   PainSc: 0-No pain           General Appearance:  alert, cooperative, no apparent distress, appears stated age and normal weight   Neurologic/Psychiatric: A&O x 3, gait steady, appropriate affect   Breasts:  Symmetrical, no masses, no lesions, no nipple discharge and scarring consistent with bilateral breast reduction. Bilateral lower horizontal scars keyloid and tender to palpation.    Abdomen:   Soft, non-tender, non-distended and no organomegaly   Lymph nodes: No axillary adenopathy noted   Skin: No rashes, ulcers, or suspicious lesions noted              Assessment and Plan:    Diagnoses and all orders for this visit:    1. At high risk for breast cancer (Primary)    2. Atypical lobular hyperplasia (ALH) of left breast    3. Scar pain  -     Ambulatory Referral to Physical Therapy Breast care    4. Keloid scar  -     Ambulatory Referral to Physical Therapy Breast care        Patient Education:  Reviewed screening schedule related to diagnosis as follows:  Monthly breast self-exam starting at age 18.  Clinical breast exam every 6 months.  Annual mammogram starting at age 40, or 10 years prior to the earliest diagnosis in the family.   Annual MRI starting at age 40, or 10 years prior " to the earliest diagnosis in the family.   Consider chemoprevention for breast cancer risk reduction (tamoxifen/evista).     Discussed the purpose of high risk clinic in coordinating, scheduling, and planning screening interventions to include yearly mammograms, yearly screening breast MRI, Q6 month CBE, and monthly SBE. Colonoscopy and GYN screening per national guidelines.   Patient sees IVAN Bang for routine well woman exams and gynecologic care.  Reviewed technique for SBE including concerning findings to report.  Continue every 6 month clinical breast exams.   Discussed yearly screening mammogram. Results will be reported by breast imaging center and a copy of report will be sent to our office for review. Last mammogram diagnostic in 5/2020, repeat screening mammogram due in 5/2021.   Discussed screening breast MRI scheduling based on menstrual cycles, HRT. We also discussed the increased sensitivity of MRI screening and possibility of call- backs for additional imaging or biopsies to establish baseline. Repeat MRI due in 8/2021, will order with next visit.   Discussed vitamin D as benefit to bone health and possible benefit to breast health. Recommended 1000 IU daily unless contraindicated  Discussed benefit of Vitamin E for fibrocystic breasts/breast pain  Discussed tamoxifen/evista use for chemoprevention including risk and benefits of use as well as side effects of medication. Patient initiated prescription Evista in 8/2020 and is happy with medication thus far, no side effects of note or complications, continue.     Patient and I also discussed her pain related to bilateral lower breast scars. I recommended referral to PT breast care and patient in agreement. Referral placed today.     Pain assessment was performed today as a part of patient’s care. For patients with pain related to surgery, gynecologic malignancy or cancer treatment, the plan is as noted in the assessment/plan.  For patients with  pain not related to these issues, they are to seek any further needed care from a more appropriate provider, such as PCP.      This was a 30 minute visit including face-to-face and non-face-to-face time on the date of service.         Return to clinic in 6 months for High Risk visit.      Electronically signed by ALLAN Pelayo on 01/06/21 at 10:06 EST

## 2021-01-12 ENCOUNTER — HOSPITAL ENCOUNTER (OUTPATIENT)
Dept: PHYSICAL THERAPY | Facility: HOSPITAL | Age: 56
Setting detail: THERAPIES SERIES
Discharge: HOME | End: 2021-01-12

## 2021-01-12 DIAGNOSIS — F34.1 DYSTHYMIA: ICD-10-CM

## 2021-01-12 DIAGNOSIS — G89.29 CHEST WALL PAIN, CHRONIC: ICD-10-CM

## 2021-01-12 DIAGNOSIS — R07.89 CHEST WALL PAIN, CHRONIC: ICD-10-CM

## 2021-01-12 DIAGNOSIS — L91.0 KELOID SCAR OF SKIN: Primary | ICD-10-CM

## 2021-01-12 PROCEDURE — 97140 MANUAL THERAPY 1/> REGIONS: CPT | Performed by: PHYSICAL THERAPIST

## 2021-01-12 PROCEDURE — 97162 PT EVAL MOD COMPLEX 30 MIN: CPT | Performed by: PHYSICAL THERAPIST

## 2021-01-12 RX ORDER — BUPROPION HYDROCHLORIDE 300 MG/1
TABLET ORAL
Qty: 90 TABLET | Refills: 1 | Status: SHIPPED | OUTPATIENT
Start: 2021-01-12 | End: 2021-07-06

## 2021-01-12 NOTE — THERAPY EVALUATION
Physical Therapy  Initial Evaluation  [facility]     Patient Name: Maddi Betancur  : 1965  MRN: [id number]  Today's Date: 2021      Visit Date: 2021    Visit Dx:    ICD-10-CM ICD-9-CM   1. Keloid scar of skin  L91.0 701.4   2. Chest wall pain, chronic  R07.89 786.52    G89.29 338.29       Patient Active Problem List   Diagnosis   • Atopic rhinitis   • Asthma   • Depression   • Mixed hyperlipidemia   • Spina bifida occulta   • Sciatica associated with disorder of lumbosacral spine   • Atypical lobular hyperplasia (ALH) of left breast   • Vitamin D deficiency   • Osteoporosis of lumbar spine        Past Medical History:   Diagnosis Date   • Acid reflux    • Anemia    • Asthma    • Atypical lobular hyperplasia (ALH) of breast    • BMI 34.0-34.9,adult    • Cervical cancer (CMS/HCC)     stage 1 or less in early  (no chemo or radiation). All pap smears negative since   • Depression    • Keloid scar of skin 2019        Past Surgical History:   Procedure Laterality Date   • BILATERAL BREAST REDUCTION  2019   • BUNIONECTOMY Left 2018   • DIAGNOSTIC LAPAROSCOPY     • EXCISION URETHRAL DIVERTICULUM FEMALE     • GANGLION CYST EXCISION     • HYSTERECTOMY     • REDUCTION MAMMAPLASTY Bilateral     2019.    • SUBTOTAL HYSTERECTOMY         Visit Dx:    ICD-10-CM ICD-9-CM   1. Keloid scar of skin  L91.0 701.4   2. Chest wall pain, chronic  R07.89 786.52    G89.29 338.29       Patient History     Row Name 21 1105             History    Chief Complaint  Burn;Pain  -MW      Type of Pain  Other pain keloid scar inferior breast line, lateral   -MW      Date Current Problem(s) Began  20  -MW      Brief Description of Current Complaint  Pt reports pressistent burning pain along areas of keloid scar from breast reduction surgery. The pain is worse at night and worse along the lateral aspects of the scars. The pain interferes with sleep every night. The pain tends to worsen as the  day goes by. The bra is somewhat helpful but also irritates along the ribs more (lateral aspect). She is able to complete her gym work outs without change in pain, other than irritation from bra along ribs. Pt presents to PT for assistance in reducing the pain and keloid.   -MW      Patient/Caregiver Goals  Relieve pain;Other (comment) Improve sleep   -MW      Patient/Caregiver Goals Comment  treat scar tissue  -MW      Patient's Rating of General Health  Good  -MW      Hand Dominance  left-handed  -MW      Occupation/sports/leisure activities  Cardio workouts, weight training, walking/hiking, swimming  -MW      Patient seeing anyone else for problem(s)?  Ivett He  -MW      How has patient tried to help current problem?  Vitamin E oil, silicone gel, cocoa butter   -MW      Are you or can you be pregnant  No  -MW         Pain     Pain Location  Chest;Rib cage  -MW      Pain at Present  3  -MW      Pain at Best  0  -MW      Pain at Worst  10  -MW      Pain Frequency  Constant/continuous  -MW      Pain Description  Burning;Other (Comment) tearing; intermittent shooting/ electrical pain   -MW      Pain Comments  Seems worse at night, wakes from sleep, then unable to find comfortable position   -MW      Is your sleep disturbed?  Yes  -MW      Total hours of sleep per night  3-4  -MW      What position do you sleep in?  Right sidelying;Left sidelying  -MW         Fall Risk Assessment    Any falls in the past year:  Yes  -MW      Factors that contributed to the fall:  Other (comment)  -MW      Other factors that contributed to the fall:  loose ankle joints  -MW         Services    Prior Rehab/Home Health Experiences  Yes  -MW      Are you currently receiving Home Health services  No  -MW      Do you plan to receive Home Health services in the near future  No  -MW         Daily Activities    Primary Language  English  -MW      Are you able to read  Yes  -MW      Are you able to write  Yes  -MW      Teaching needs  identified  Management of Condition  -MW      Patient is concerned about/has problems with  Other (comment) sleeping   -MW      Barriers to learning  None  -MW      Pt Participated in POC and Goals  Yes  -MW         Safety    Are you being hurt, hit, or frightened by anyone at home or in your life?  No  -MW      Are you being neglected by a caregiver  No  -MW      Have you had any of the following issues with  Depression  -MW        User Key  (r) = Recorded By, (t) = Taken By, (c) = Cosigned By    Initials Name Provider Type    Katelynn Calvillo, PT Physical Therapist          Lymphedema     Row Name 01/12/21 1100             Skin Changes/Observations    Location/Assessment  Upper Quadrant  -MW      Upper Quadrant Conditions  bilateral:;intact;clean  -MW      Upper Quadrant Color/Pigment  bilateral:;other (comment) pink raised keloid tissue along inferior incision line   -MW      Upper Quadrant Skin Details  keloid scar tissue approx 0.8 cm wide and 0.5 cm thick/ elevated from skin surface   -MW      Skin Observations Comment  Keloid scars with moderate mobility off of ribs   -MW         Manual Lymphatic Drainage    Manual Therapy  ASTYM: to Lt chest/ inferior and lateral to breast very light strokes with drag position, 3 passes M-L and L-M with evaluator and localizer. Evaluator over Keloid, localizer parallel to keloid.   -MW        User Key  (r) = Recorded By, (t) = Taken By, (c) = Cosigned By    Initials Name Provider Type    Katelynn Calvillo, PT Physical Therapist            PT Ortho     Row Name 01/12/21 1100       Subjective Comments    Subjective Comments  Pt voices frustration with chronic burning & tearing pain along inferior scars from breast reduction surgery as it has been over 1 year ago.    -MW       Subjective Pain    Able to rate subjective pain?  yes  -MW    Pre-Treatment Pain Level  3  -MW    Post-Treatment Pain Level  6  -MW    Subjective Pain Comment  Worst along lateral aspect of Lt  keloid   -MW       Posture/Observations    Alignment Options  Rounded shoulders  -MW    Rounded Shoulders  Bilateral:;Mild  -MW       General ROM    Head/Neck/Trunk  Trunk Lt Lateral Flexion;Trunk Rt Lateral Flexion;Trunk Lt Rotation;Trunk Rt Rotation  -MW    GENERAL ROM COMMENTS  Bilat shoulders WFL gross movements   -MW       Head/Neck/Trunk    Trunk Lt Lateral Flexion AROM  WFL  -MW    Trunk Rt Lateral Flexion AROM  WFL   -MW    Trunk Lt Rotation AROM  WFL   -MW    Trunk Rt Rotation AROM  WFL   -MW       Sensation    Additional Comments  Bilateral Keloid scars inferior breast line with hyposensitivity medially and hypersensitivity laterally   -MW      User Key  (r) = Recorded By, (t) = Taken By, (c) = Cosigned By    Initials Name Provider Type    Katelynn Calvillo, PT Physical Therapist                    Therapy Education  Education Details: Risks and benefits of ASTYM as treatment option. Silicone as another treatment option.   Given: Symptoms/condition management(drink water )  Program: New  How Provided: Verbal, Demonstration  Provided to: Patient  Level of Understanding: Teach back education performed, Verbalized      OP Exercises     Row Name 01/12/21 1414 01/12/21 1100          Subjective Comments    Subjective Comments  --  Pt voices frustration with chronic burning & tearing pain along inferior scars from breast reduction surgery as it has been over 1 year ago.    -MW        Subjective Pain    Able to rate subjective pain?  --  yes  -MW     Pre-Treatment Pain Level  --  3  -MW     Post-Treatment Pain Level  --  6  -MW     Subjective Pain Comment  --  Worst along lateral aspect of Lt keloid   -MW        Total Minutes    20506 - PT Manual Therapy Minutes  12  -MW  12  -MW       User Key  (r) = Recorded By, (t) = Taken By, (c) = Cosigned By    Initials Name Provider Type    Katelynn Calvillo, PT Physical Therapist                     Manual Rx (last 36 hours)      Manual Treatments     Row Name  01/12/21 1414 01/12/21 1100          Total Minutes    57682 - PT Manual Therapy Minutes  12  -MW  12  -MW       User Key  (r) = Recorded By, (t) = Taken By, (c) = Cosigned By    Initials Name Provider Type    Katelynn Calvillo, PT Physical Therapist          PT OP Goals     Row Name 01/12/21 1414 01/12/21 1100       PT Short Term Goals    STG Date to Achieve  --  02/09/21  -MW    STG 1  --  Pt to report decreased burning / tearing pain at night; able to sleep > 4hr without waking.   -MW    STG 2  --  Keloid tissue to decrease at least 10%.   -MW    STG 3  --  Pt to report pain at less then 5 on average throughout the day.   -MW    STG 4  --  Pt independent with self care HEP for skin.   -MW       Long Term Goals    LTG 1  --  Pt to report decreased burning / tearing pain at night; able to sleep > 6 hr without waking due to pain.   -MW    LTG 2  --  Pt to report pain at less then 3 on average throughout the day.   -MW       Time Calculation    PT Goal Re-Cert Due Date  04/12/21  -MW  04/12/21  -MW      User Key  (r) = Recorded By, (t) = Taken By, (c) = Cosigned By    Initials Name Provider Type    Katelynn Calvillo, PT Physical Therapist          PT Assessment/Plan     Row Name 01/12/21 1100          PT Assessment    Functional Limitations  Performance in self-care ADL;Other (comment) sleep  -MW     Impairments  Integumentary integrity;Pain;Sensation  -MW     Assessment Comments  Pt presents with evolving symptoms of soft tissue pain and impaired integument integrity s/p bilateral breast reduction surgery greater than 1 year ago. Her pain is chronic and is burning and tearing in sensation. The pain is interfering with her sleep nightly; she reports only 3-4 hours of sleep per night. She is working full-time and exercises 5-7 days per week. She demonstrates functional to full AROM of her shoulders and trunk. She has adequate soft tissue mobility around her incision lines, however, she does display keloid type  scarring along the inferior incision line. This tissue is hyposensitive medially but hypersensitive laterally; the burning and tearing sensations seem to center around this lateral tissue. This issue is bilateral with the LT being more sensitive. A trial of ASTYM was initiated on the LT side this date. Pt is expected to benefit form manual therapy, ASTYM, breathing exercises, as well as options to try Kinesiotape and/ or silicone sheets with or without compression.   -MW     Rehab Potential  Fair  -MW     Patient/caregiver participated in establishment of treatment plan and goals  Yes  -MW     Patient would benefit from skilled therapy intervention  Yes  -MW        PT Plan    PT Frequency  2x/week  -MW     Predicted Duration of Therapy Intervention (PT)  12 weeks   -MW     Planned CPT's?  PT EVAL MOD COMPLELITY: 30727;PT MANUAL THERAPY EA 15 MIN: 22560;PT THER PROC EA 15 MIN: 88127;PT SELF CARE/HOME MGMT/TRAIN EA 15: 01041  -     Physical Therapy Interventions (Optional Details)  manual therapy techniques;taping;patient/family education;home exercise program  -     PT Plan Comments  Trial of ASTYM Lt; reassess and progress or modify   -       User Key  (r) = Recorded By, (t) = Taken By, (c) = Cosigned By    Initials Name Provider Type    MW Katelynn Kiser, PT Physical Therapist                       Time Calculation:   Start Time: 0800  Total Timed Code Minutes- PT: 12 minute(s)   Therapy Charges for Today     Code Description Service Date Service Provider Modifiers Qty    76883540851  PT MANUAL THERAPY EA 15 MIN 1/12/2021 Katelynn Kiser, PT GP 1    94969620401 HC PT EVAL MOD COMPLEXITY 4 1/12/2021 Katelynn Kiser, PT GP 1                    Katelynn Kiser PT  1/12/2021

## 2021-01-14 ENCOUNTER — HOSPITAL ENCOUNTER (OUTPATIENT)
Dept: PHYSICAL THERAPY | Facility: HOSPITAL | Age: 56
Setting detail: THERAPIES SERIES
Discharge: HOME | End: 2021-01-14

## 2021-01-14 DIAGNOSIS — R07.89 CHEST WALL PAIN, CHRONIC: ICD-10-CM

## 2021-01-14 DIAGNOSIS — G89.29 CHEST WALL PAIN, CHRONIC: ICD-10-CM

## 2021-01-14 DIAGNOSIS — L91.0 KELOID SCAR OF SKIN: Primary | ICD-10-CM

## 2021-01-14 PROCEDURE — 97535 SELF CARE MNGMENT TRAINING: CPT | Performed by: PHYSICAL THERAPIST

## 2021-01-14 PROCEDURE — 97140 MANUAL THERAPY 1/> REGIONS: CPT | Performed by: PHYSICAL THERAPIST

## 2021-01-14 NOTE — THERAPY TREATMENT NOTE
Outpatient Physical Therapy  Treatment Note  [facility]     Patient Name: Maddi Betancur  : 1965  MRN: [id number]  Today's Date: 2021        Visit Date: 2021    Visit Dx:    ICD-10-CM ICD-9-CM   1. Keloid scar of skin  L91.0 701.4   2. Chest wall pain, chronic  R07.89 786.52    G89.29 338.29       Patient Active Problem List   Diagnosis   • Atopic rhinitis   • Asthma   • Depression   • Mixed hyperlipidemia   • Spina bifida occulta   • Sciatica associated with disorder of lumbosacral spine   • Atypical lobular hyperplasia (ALH) of left breast   • Vitamin D deficiency   • Osteoporosis of lumbar spine        Lymphedema     Row Name 21 142             Subjective Pain    Able to rate subjective pain?  yes  -MW      Pre-Treatment Pain Level  2  -MW      Post-Treatment Pain Level  6  -MW      Subjective Pain Comment  Worst Lt lateral scar. Rt 0 & 3   -MW         Subjective Comments    Subjective Comments  Pt reports less tearing pain, but burning the same.   -MW         Skin Changes/Observations    Location/Assessment  Upper Quadrant  -MW      Upper Quadrant Conditions  bilateral:;intact;clean  -MW      Upper Quadrant Color/Pigment  bilateral:;other (comment) pink raised keloid tissue along inferior incision line   -MW         Manual Lymphatic Drainage    Manual Therapy  ASTYM: Bilat chest/ inferior and lateral to breast light strokes 3 passes M-L and L-M with evaluator and localizer.   -MW        User Key  (r) = Recorded By, (t) = Taken By, (c) = Cosigned By    Initials Name Provider Type    MW Katelynn Kiser, PT Physical Therapist                        PT Assessment/Plan     Row Name 21 142          PT Assessment    Functional Limitations  Performance in self-care ADL;Other (comment) sleep  -MW     Impairments  Integumentary integrity;Pain;Sensation  -MW     Assessment Comments  Pt with some change in negative sensation at Lt keloid following initial ASTYM tx. Added Rt side tx  and increased technique with typical tool position rather than drag position. Discussed option for Kinesiotape as well; trial piece placed to rule out any skin sensitivities. Instructed pt in positive visualization of scars/ healing of the scars. Cont PT.   -MW        PT Plan    PT Frequency  2x/week  -MW     Physical Therapy Interventions (Optional Details)  manual therapy techniques;taping;patient/family education;home exercise program  -MW     PT Plan Comments  Cont ASTYM; ck KT tolerance   -MW       User Key  (r) = Recorded By, (t) = Taken By, (c) = Cosigned By    Initials Name Provider Type    Katelynn Calvillo PT Physical Therapist             OP Exercises     Row Name 01/14/21 1420             Subjective Comments    Subjective Comments  Pt reports less tearing pain, but burning the same.   -MW         Subjective Pain    Able to rate subjective pain?  yes  -MW      Pre-Treatment Pain Level  2  -MW      Post-Treatment Pain Level  6  -MW      Subjective Pain Comment  Worst Lt lateral scar. Rt 0 & 3   -MW         Total Minutes    57073 - PT Manual Therapy Minutes  13  -MW        User Key  (r) = Recorded By, (t) = Taken By, (c) = Cosigned By    Initials Name Provider Type    Katelynn Calvillo PT Physical Therapist                     Manual Rx (last 36 hours)      Manual Treatments     Row Name 01/14/21 1420             Total Minutes    20789 - PT Manual Therapy Minutes  13  -MW        User Key  (r) = Recorded By, (t) = Taken By, (c) = Cosigned By    Initials Name Provider Type    Katelynn Calvillo PT Physical Therapist              Therapy Education  Education Details: Pt to continue to monitor sensations along keloids. Trial of Kinesiotape to Rt medial forearm to check skin sensitivity. Instructed pt in visualizations of peace and calm/ positive energy over her scares. Pt to gently place hands over scars prior to sleep and visualize peace, positive healing of her scars.   Given: Symptoms/condition  management, Pain management  Program: Progressed  How Provided: Verbal, Demonstration  Provided to: Patient  Level of Understanding: Teach back education performed, Verbalized  76780 - PT Self Care/Mgmt Minutes: 10              Time Calculation:   Start Time: 1420  Total Timed Code Minutes- PT: 23 minute(s)   Therapy Charges for Today     Code Description Service Date Service Provider Modifiers Qty    69327436225 HC PT MANUAL THERAPY EA 15 MIN 1/14/2021 Katelynn Kiser, PT GP 1    77379991144 HC PT SELF CARE/MGMT/TRAIN EA 15 MIN 1/14/2021 Katelynn Kiser, PT GP 1                    Katelynn Kiser, PT  1/14/2021

## 2021-01-28 ENCOUNTER — HOSPITAL ENCOUNTER (OUTPATIENT)
Dept: PHYSICAL THERAPY | Facility: HOSPITAL | Age: 56
Setting detail: THERAPIES SERIES
Discharge: HOME | End: 2021-01-28

## 2021-01-28 DIAGNOSIS — G89.29 CHEST WALL PAIN, CHRONIC: ICD-10-CM

## 2021-01-28 DIAGNOSIS — L91.0 KELOID SCAR OF SKIN: Primary | ICD-10-CM

## 2021-01-28 DIAGNOSIS — R07.89 CHEST WALL PAIN, CHRONIC: ICD-10-CM

## 2021-01-28 PROCEDURE — 97140 MANUAL THERAPY 1/> REGIONS: CPT | Performed by: PHYSICAL THERAPIST

## 2021-01-28 PROCEDURE — 97535 SELF CARE MNGMENT TRAINING: CPT | Performed by: PHYSICAL THERAPIST

## 2021-01-28 NOTE — THERAPY TREATMENT NOTE
"    Outpatient Physical Therapy  Treatment Note  [facility]     Patient Name: Maddi Betancur  : 1965  MRN: [id number]  Today's Date: 2021        Visit Date: 2021    Visit Dx:    ICD-10-CM ICD-9-CM   1. Keloid scar of skin  L91.0 701.4   2. Chest wall pain, chronic  R07.89 786.52    G89.29 338.29       Patient Active Problem List   Diagnosis   • Atopic rhinitis   • Asthma   • Depression   • Mixed hyperlipidemia   • Spina bifida occulta   • Sciatica associated with disorder of lumbosacral spine   • Atypical lobular hyperplasia (ALH) of left breast   • Vitamin D deficiency   • Osteoporosis of lumbar spine        Lymphedema     Row Name 21 1510             Subjective Pain    Able to rate subjective pain?  yes  -MW      Pre-Treatment Pain Level  1  -MW      Post-Treatment Pain Level  6  -MW      Subjective Pain Comment  LT lateral   -MW         Subjective Comments    Subjective Comments  Pt reports some what less burning pain, but still wakes with \"tearing\" pain at night. States positive visualization and breathing \"didn't help at all\"   -MW         Skin Changes/Observations    Location/Assessment  Upper Quadrant  -MW      Upper Quadrant Conditions  bilateral:;intact;clean  -MW      Upper Quadrant Color/Pigment  bilateral:;other (comment) pink raised keloid tissue along inferior incision line   -MW         Manual Lymphatic Drainage    Manual Therapy  ASTYM: Bilat chest/ inferior and lateral to breast light strokes 3 passes M-L and L-M with evaluator and localizer. Single pass of \"Star burst\" with isolator perpendicular to incision lines.   -MW        User Key  (r) = Recorded By, (t) = Taken By, (c) = Cosigned By    Initials Name Provider Type    Katelynn Calvillo, PT Physical Therapist                        PT Assessment/Plan     Row Name 21 1510          PT Assessment    Functional Limitations  Performance in self-care ADL;Other (comment) sleep  -MW     Impairments  Integumentary " "integrity;Pain;Sensation  -MW     Assessment Comments  Pt with slight improvement in burning pain, but no change in tearing pain at night, and some increase in shooting/ sudden pains. Reports no benefit from positivie visualization with breathing but unsure how long she tried. Discussed events surrounding surgery; pt was awake and talking with staff during the procedure but does not recall any negative emotions or pain / tearing sensations with surgery. Cont ASTYM, trial of Kinesiotape to be placed after showering.   -MW     Rehab Potential  Fair  -MW        PT Plan    PT Frequency  1x/week;2x/week  -     Physical Therapy Interventions (Optional Details)  manual therapy techniques;taping;patient/family education;home exercise program  -     PT Plan Comments  Cont ASTYM; ck KT benefit   -MW       User Key  (r) = Recorded By, (t) = Taken By, (c) = Cosigned By    Initials Name Provider Type    Katelynn Calvillo, PT Physical Therapist             OP Exercises     Row Name 01/28/21 1510             Subjective Comments    Subjective Comments  Pt reports some what less burning pain, but still wakes with \"tearing\" pain at night. States positive visualization and breathing \"didn't help at all\"   -MW         Subjective Pain    Able to rate subjective pain?  yes  -MW      Pre-Treatment Pain Level  1  -MW      Post-Treatment Pain Level  6  -MW      Subjective Pain Comment  LT lateral   -MW         Total Minutes    56054 - PT Manual Therapy Minutes  15  -MW        User Key  (r) = Recorded By, (t) = Taken By, (c) = Cosigned By    Initials Name Provider Type    Katelynn Calvillo, PT Physical Therapist                     Manual Rx (last 36 hours)      Manual Treatments     Row Name 01/28/21 1510             Total Minutes    81230 - PT Manual Therapy Minutes  15  -MW        User Key  (r) = Recorded By, (t) = Taken By, (c) = Cosigned By    Initials Name Provider Type    Katelynn Calvillo, PT Physical Therapist    "           Therapy Education  Education Details: Reviewed proper placement, use and removal of kinesiotape at keloid scars. Pt to place after her shower. Reviewed basic concept of phantom limb pain and how this may be part of the sensations she is experiencing.   Given: Symptoms/condition management, Pain management  Program: Progressed  How Provided: Verbal, Demonstration  Provided to: Patient  Level of Understanding: Teach back education performed, Verbalized  64440 - PT Self Care/Mgmt Minutes: 10              Time Calculation:   Start Time: 1510  Total Timed Code Minutes- PT: 25 minute(s)   Therapy Charges for Today     Code Description Service Date Service Provider Modifiers Qty    34752667385 HC PT MANUAL THERAPY EA 15 MIN 1/28/2021 Katelynn Kiser, PT GP 1    61002424427 HC PT SELF CARE/MGMT/TRAIN EA 15 MIN 1/28/2021 Katelynn Kiser, PT GP 1                    Katelynn Kiser, PT  1/28/2021

## 2021-01-29 ENCOUNTER — TELEPHONE (OUTPATIENT)
Dept: INTERNAL MEDICINE | Facility: CLINIC | Age: 56
End: 2021-01-29

## 2021-01-29 DIAGNOSIS — E78.2 MIXED HYPERLIPIDEMIA: Primary | ICD-10-CM

## 2021-01-29 DIAGNOSIS — E55.9 VITAMIN D DEFICIENCY: ICD-10-CM

## 2021-01-29 DIAGNOSIS — M81.0 OSTEOPOROSIS OF LUMBAR SPINE: ICD-10-CM

## 2021-01-29 DIAGNOSIS — Z11.59 NEED FOR HEPATITIS C SCREENING TEST: ICD-10-CM

## 2021-01-29 DIAGNOSIS — Z00.00 PREVENTATIVE HEALTH CARE: ICD-10-CM

## 2021-01-29 NOTE — TELEPHONE ENCOUNTER
Caller: Maddi Betancur    Relationship: Self    Best call back number: [phone]    What orders are you requesting (i.e. lab or imaging): LAB    In what timeframe would the patient need to come in: Monday 02/01 BEFORE APPOINTMENT ON SAT 02/06    Where will you receive your lab/imaging services: IN OFFICE    Additional notes: PLEASE CALL PATIENT WHEN LAB ORDERS ARE READY

## 2021-02-03 LAB
25(OH)D3+25(OH)D2 SERPL-MCNC: 55.1 NG/ML (ref 30–100)
ALBUMIN SERPL-MCNC: 4.2 G/DL (ref 3.5–5.2)
ALBUMIN/GLOB SERPL: 2 G/DL
ALP SERPL-CCNC: 59 U/L (ref 39–117)
ALT SERPL-CCNC: 13 U/L (ref 1–33)
AST SERPL-CCNC: 20 U/L (ref 1–32)
BILIRUB SERPL-MCNC: 0.3 MG/DL (ref 0–1.2)
BUN SERPL-MCNC: 7 MG/DL (ref 6–20)
BUN/CREAT SERPL: 9.7 (ref 7–25)
CALCIUM SERPL-MCNC: 9.2 MG/DL (ref 8.6–10.5)
CHLORIDE SERPL-SCNC: 106 MMOL/L (ref 98–107)
CHOLEST SERPL-MCNC: 133 MG/DL (ref 0–200)
CO2 SERPL-SCNC: 27.9 MMOL/L (ref 22–29)
CREAT SERPL-MCNC: 0.72 MG/DL (ref 0.57–1)
ERYTHROCYTE [DISTWIDTH] IN BLOOD BY AUTOMATED COUNT: 13.6 % (ref 12.3–15.4)
GLOBULIN SER CALC-MCNC: 2.1 GM/DL
GLUCOSE SERPL-MCNC: 92 MG/DL (ref 65–99)
HCT VFR BLD AUTO: 34.6 % (ref 34–46.6)
HCV AB S/CO SERPL IA: <0.1 S/CO RATIO (ref 0–0.9)
HDLC SERPL-MCNC: 68 MG/DL (ref 40–60)
HGB BLD-MCNC: 11.1 G/DL (ref 12–15.9)
LDLC SERPL CALC-MCNC: 53 MG/DL (ref 0–100)
MCH RBC QN AUTO: 27.9 PG (ref 26.6–33)
MCHC RBC AUTO-ENTMCNC: 32.1 G/DL (ref 31.5–35.7)
MCV RBC AUTO: 86.9 FL (ref 79–97)
PLATELET # BLD AUTO: 191 10*3/MM3 (ref 140–450)
POTASSIUM SERPL-SCNC: 4.2 MMOL/L (ref 3.5–5.2)
PROT SERPL-MCNC: 6.3 G/DL (ref 6–8.5)
RBC # BLD AUTO: 3.98 10*6/MM3 (ref 3.77–5.28)
SODIUM SERPL-SCNC: 140 MMOL/L (ref 136–145)
TRIGL SERPL-MCNC: 51 MG/DL (ref 0–150)
TSH SERPL DL<=0.005 MIU/L-ACNC: 4.36 UIU/ML (ref 0.27–4.2)
VLDLC SERPL CALC-MCNC: 12 MG/DL (ref 5–40)
WBC # BLD AUTO: 4.42 10*3/MM3 (ref 3.4–10.8)

## 2021-02-04 ENCOUNTER — TELEPHONE (OUTPATIENT)
Dept: INTERNAL MEDICINE | Facility: CLINIC | Age: 56
End: 2021-02-04

## 2021-02-06 ENCOUNTER — OFFICE VISIT (OUTPATIENT)
Dept: INTERNAL MEDICINE | Facility: CLINIC | Age: 56
End: 2021-02-06

## 2021-02-06 VITALS
HEIGHT: 62 IN | TEMPERATURE: 97.8 F | DIASTOLIC BLOOD PRESSURE: 60 MMHG | WEIGHT: 130.6 LBS | SYSTOLIC BLOOD PRESSURE: 110 MMHG | OXYGEN SATURATION: 99 % | BODY MASS INDEX: 24.03 KG/M2 | HEART RATE: 57 BPM

## 2021-02-06 DIAGNOSIS — K21.9 GASTROESOPHAGEAL REFLUX DISEASE, UNSPECIFIED WHETHER ESOPHAGITIS PRESENT: ICD-10-CM

## 2021-02-06 DIAGNOSIS — Z00.00 ANNUAL PHYSICAL EXAM: Primary | ICD-10-CM

## 2021-02-06 DIAGNOSIS — Z12.11 SCREEN FOR COLON CANCER: ICD-10-CM

## 2021-02-06 DIAGNOSIS — Z91.89 AT HIGH RISK FOR VENOUS THROMBOEMBOLISM: ICD-10-CM

## 2021-02-06 DIAGNOSIS — R79.89 ABNORMAL TSH: ICD-10-CM

## 2021-02-06 DIAGNOSIS — R60.0 EDEMA OF RIGHT LOWER EXTREMITY: ICD-10-CM

## 2021-02-06 DIAGNOSIS — M81.0 OSTEOPOROSIS OF LUMBAR SPINE: ICD-10-CM

## 2021-02-06 DIAGNOSIS — Z23 NEED FOR PNEUMOCOCCAL VACCINATION: ICD-10-CM

## 2021-02-06 PROBLEM — E55.9 VITAMIN D DEFICIENCY: Chronic | Status: ACTIVE | Noted: 2020-01-31

## 2021-02-06 PROBLEM — N60.92 ATYPICAL LOBULAR HYPERPLASIA (ALH) OF LEFT BREAST: Chronic | Status: ACTIVE | Noted: 2019-11-14

## 2021-02-06 PROBLEM — E78.2 MIXED HYPERLIPIDEMIA: Chronic | Status: ACTIVE | Noted: 2018-11-01

## 2021-02-06 PROCEDURE — 99396 PREV VISIT EST AGE 40-64: CPT | Performed by: PHYSICIAN ASSISTANT

## 2021-02-06 NOTE — PROGRESS NOTES
Subjective   Maddi Betancur is a 55 y.o. female and is here for a comprehensive physical exam. The patient reports problems - edema in right ankle.    HPI: donated blood last on 1/8, explains mild anemia on labs from last week.     She has intentionally lost 15 pounds in the last 6 months. She has noticed abnormally dry skin of her hands and feet. She denies abnormal fatigue, bowel habit changes or heart rate changes. She denies any new temperature intolerance but notes that she always stays cold which is chronic.     She has noticed some swelling in the right ankle since surgery 1 year ago. No CP or SOA. She has been on Raloxifene for the last 6 months which can increase risk for thromboembolism. Her mother and maternal grandmother may have had DVTs, her mother's occurring post-operatively and cause of grandmother's is unknown.    Osteoporosis- she is taking a calcium and vitamin D supplement and also has been taking raloxifene for the last 6 months.  She previously tried Prolia injection which adverse response of severe joint pain for a few weeks following.  She would like to hold off on considering any further treatment until after the next DEXA scan which is due in 1 year.      Health Habits:  Eye exam within last 2 years? yes  Dental exam every 6 months? yes  Exercise habits: 4-6 days per week  Healthy diet? yes      The 10-year ASCVD risk score (Dwayne JENNIFER Jr., et al., 2013) is: 0.9%    Values used to calculate the score:      Age: 55 years      Sex: Female      Is Non- : No      Diabetic: No      Tobacco smoker: No      Systolic Blood Pressure: 110 mmHg      Is BP treated: No      HDL Cholesterol: 68 mg/dL      Total Cholesterol: 133 mg/dL        Do you take any herbs or supplements that were not prescribed by a doctor? yes  Are you taking calcium supplements? Yes  Are you taking aspirin daily? No     History:  LMP: No LMP recorded. Patient has had a hysterectomy.  Menopause:  Yes  Last pap date: 3/2020  Abnormal pap? no  Family history of breast or ovarian cancer: yes         OB History    Para Term  AB Living   4 3 3   1 3   SAB TAB Ectopic Molar Multiple Live Births   1                # Outcome Date GA Lbr Chau/2nd Weight Sex Delivery Anes PTL Lv   4 Term            3 Term            2 Term            1 SAB               reports being sexually active. She reports using the following method of birth control/protection: Surgical.        The following portions of the patient's history were reviewed and updated as appropriate: She  has a past medical history of Acid reflux, Anemia, Asthma, Atypical lobular hyperplasia (ALH) of breast, BMI 34.0-34.9,adult, Cervical cancer (CMS/HCC), Depression, and Keloid scar of skin (2019).  She does not have any pertinent problems on file.  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  Her family history includes Breast cancer in her maternal aunt; Cervical cancer in her mother; Hyperlipidemia in her mother; Hypertension in her mother; Lung cancer in her mother; Lymphoma in her mother.  She  reports that she has quit smoking. She has never used smokeless tobacco. She reports that she does not drink alcohol or use drugs.  Current Outpatient Medications   Medication Sig Dispense Refill   • azelastine (OPTIVAR) 0.05 % ophthalmic solution Apply  to eye every 12 (twelve) hours.     • B Complex Vitamins (VITAMIN B COMPLEX PO) Take  by mouth.     • betamethasone valerate (VALISONE) 0.1 % cream betamethasone valerate 0.1 % topical cream   As Directed     • buPROPion XL (WELLBUTRIN XL) 300 MG 24 hr tablet TAKE 1 TABLET EVERY MORNING 90 tablet 1   • Calcium Carbonate-Vitamin D (TGT CALCIUM DIETARY SUPPLEMENT PO)      • [facility]  FIBER GUMMIES PO Take  by mouth.     • EPIPEN 2-DONNA 0.3 MG/0.3ML solution auto-injector injection use as directed for ALLERGIC REACTION  0   • fluticasone (FLONASE ALLERGY RELIEF) 50 MCG/ACT nasal spray Flonase Allergy Relief 50 mcg/actuation nasal spray,suspension   Daily     • Multiple Vitamins-Minerals (AIRBORNE PO) Take  by mouth.     • raloxifene (Evista) 60 MG tablet Take 1 tablet by mouth Daily. 90 tablet 4   • rosuvastatin (CRESTOR) 10 MG tablet TAKE 1 TABLET NIGHTLY      *GLENMARK MFR* 90 tablet 3     No current facility-administered medications for this visit.        Review of Systems  Do you have pain that bothers you in your daily life? no    Review of Systems   Constitutional: Negative for activity change, appetite change, chills, diaphoresis, fatigue, fever, unexpected weight gain and unexpected weight loss.   HENT: Negative for congestion, dental problem, ear pain, mouth sores, postnasal drip, rhinorrhea, sinus pressure, sneezing, sore throat, swollen glands, trouble swallowing and voice change.    Eyes: Negative for blurred vision, double vision, pain, discharge, redness, itching and visual disturbance.   Respiratory: Negative for cough, choking, chest tightness, shortness of breath and wheezing.    Cardiovascular: Positive for leg swelling. Negative for chest pain and palpitations.   Gastrointestinal: Positive for constipation (chronic), GERD and indigestion. Negative for abdominal distention, abdominal pain, blood in stool, diarrhea, nausea, rectal pain and vomiting.   Endocrine: Negative for cold intolerance, heat intolerance, polydipsia, polyphagia and polyuria.   Genitourinary: Negative for breast discharge, breast lump, breast pain, decreased libido, decreased urine volume, difficulty urinating, dysuria, flank pain, frequency, hematuria, pelvic pain, urgency, vaginal bleeding and vaginal pain.   Musculoskeletal: Negative for arthralgias, back pain, gait problem, myalgias and neck pain.   Skin:  "Negative for color change, rash and skin lesions.   Allergic/Immunologic: Positive for environmental allergies. Negative for immunocompromised state.   Neurological: Negative for dizziness, tremors, facial asymmetry, speech difficulty, weakness, light-headedness, numbness, headache, memory problem and confusion.   Hematological: Negative for adenopathy. Bruises/bleeds easily (bruise).   Psychiatric/Behavioral: Negative for agitation, behavioral problems, decreased concentration, hallucinations, self-injury, sleep disturbance, suicidal ideas, negative for hyperactivity, depressed mood and stress. The patient is not nervous/anxious.          Objective   /60   Pulse 57   Temp 97.8 °F (36.6 °C) (Infrared)   Ht 156.2 cm (61.5\")   Wt 59.2 kg (130 lb 9.6 oz)   SpO2 99%   BMI 24.28 kg/m²     Physical Exam  Vitals signs and nursing note reviewed.   Constitutional:       General: She is not in acute distress.     Appearance: Normal appearance. She is well-developed and normal weight. She is not ill-appearing, toxic-appearing or diaphoretic.   HENT:      Head: Normocephalic and atraumatic.      Right Ear: Tympanic membrane, ear canal and external ear normal. There is no impacted cerumen.      Left Ear: Tympanic membrane, ear canal and external ear normal. There is no impacted cerumen.   Eyes:      General: No scleral icterus.     Extraocular Movements: Extraocular movements intact.      Conjunctiva/sclera: Conjunctivae normal.      Pupils: Pupils are equal, round, and reactive to light.   Neck:      Musculoskeletal: Normal range of motion and neck supple. No neck rigidity or muscular tenderness.      Thyroid: No thyromegaly.   Cardiovascular:      Rate and Rhythm: Normal rate and regular rhythm.      Heart sounds: Normal heart sounds. No murmur. No friction rub. No gallop.    Pulmonary:      Effort: Pulmonary effort is normal. No respiratory distress.      Breath sounds: Normal breath sounds. No wheezing, rhonchi " or rales.   Chest:      Chest wall: No tenderness.   Abdominal:      General: Bowel sounds are normal. There is no distension.      Palpations: Abdomen is soft. There is no mass.      Tenderness: There is abdominal tenderness (mild) in the left upper quadrant. There is no right CVA tenderness, left CVA tenderness or rebound.   Musculoskeletal: Normal range of motion.         General: Tenderness ( positive Sydnie's sign right leg) present. No deformity.      Right lower leg: Edema (trace pitting) present.      Left lower leg: No edema.   Lymphadenopathy:      Cervical: No cervical adenopathy.   Skin:     General: Skin is warm and dry.      Capillary Refill: Capillary refill takes less than 2 seconds.      Coloration: Skin is not pale.      Findings: No erythema or rash.   Neurological:      General: No focal deficit present.      Mental Status: She is alert and oriented to person, place, and time.      Cranial Nerves: No cranial nerve deficit.      Sensory: No sensory deficit.      Gait: Gait normal.      Deep Tendon Reflexes: Reflexes normal.   Psychiatric:         Mood and Affect: Mood normal.         Behavior: Behavior normal.         Thought Content: Thought content normal.         Judgment: Judgment normal.            Assessment/Plan   Healthy female exam.     1.    Diagnosis Plan   1. Annual physical exam     2. BMI 24.0-24.9, adult  Patient's Body mass index is 24.28 kg/m². BMI is within normal parameters. No follow-up required..     3. Edema of right lower extremity  US venous doppler lower extremity right (duplex)   4. At high risk for venous thromboembolism  US venous doppler lower extremity right (duplex)     5. Abnormal TSH  TSH    T4, Free    T3    Thyroid Peroxidase Antibody    Will base decision to initiate treatment on further lab results.     6. Screen for colon cancer  Ambulatory Referral to General Surgery     7. Gastroesophageal reflux disease, unspecified whether esophagitis present  Helicobacter  Pylori, IgA IgG IgM       8. Need for pneumococcal vaccination  Pneumococcal Polysaccharide Vaccine 23-Valent Greater Than or Equal To 3yo Subcutaneous / IM (ordered for future administration, not administered today)     9. Osteoporosis of lumbar spine  She is taking calcium and vitamin D as well as raloxifene which has some benefit in preserving bone mass.  She elected not to try any more aggressive treatment for osteoporosis at this time but may reconsider after repeating DEXA scan 1 year from now when due.       2. Patient Counseling:  --Nutrition: Stressed importance of moderation in sodium/caffeine intake, saturated fat and cholesterol, caloric balance, sufficient intake of fresh fruits, vegetables, fiber, calcium, iron, and 1 g folate.  --Discussed the issue of calcium supplement, and the daily use of baby aspirin if applicable.             --Mammogram recommended every 2 years from age 40-49 and yearly beginning at age 50.  --Exercise: Stressed the importance of regular exercise.   --Substance Abuse: Discussed cessation/primary prevention of tobacco (if applicable), alcohol, or other drug use (if applicable); driving or other dangerous activities under the influence; availability of treatment for abuse.    --Sexuality: Discussed sexually transmitted diseases, partner selection, use of condoms, avoidance of unintended pregnancy  and contraceptive alternatives.   --Injury prevention: Discussed safety belts, safety helmets, smoke detector, smoking near bedding or upholstery.   --Dental health: Discussed importance of regular tooth brushing, flossing, and dental visits every 6 months.  --Immunizations reviewed.  --Discussed benefits of screening colonoscopy (if applicable).  --After hours service discussed with patient.    3. Discussed the patient's BMI with her.  The BMI is in the acceptable range  4. Return in about 1 year (around 2/6/2022) for Annual physical.         Leonor Ambriz,  PA  02/06/2021  11:13 EST

## 2021-02-08 ENCOUNTER — HOSPITAL ENCOUNTER (OUTPATIENT)
Dept: CARDIOLOGY | Facility: HOSPITAL | Age: 56
Discharge: HOME | End: 2021-02-08
Admitting: PHYSICIAN ASSISTANT

## 2021-02-08 ENCOUNTER — TELEPHONE (OUTPATIENT)
Dept: SURGERY | Facility: CLINIC | Age: 56
End: 2021-02-08

## 2021-02-08 PROCEDURE — 93971 EXTREMITY STUDY: CPT

## 2021-02-08 PROCEDURE — 93971 EXTREMITY STUDY: CPT | Performed by: INTERNAL MEDICINE

## 2021-02-09 LAB
BH CV LOWER VASCULAR LEFT COMMON FEMORAL AUGMENT: NORMAL
BH CV LOWER VASCULAR LEFT COMMON FEMORAL COMPRESS: NORMAL
BH CV LOWER VASCULAR LEFT COMMON FEMORAL PHASIC: NORMAL
BH CV LOWER VASCULAR LEFT COMMON FEMORAL SPONT: NORMAL
BH CV LOWER VASCULAR RIGHT COMMON FEMORAL AUGMENT: NORMAL
BH CV LOWER VASCULAR RIGHT COMMON FEMORAL COMPRESS: NORMAL
BH CV LOWER VASCULAR RIGHT COMMON FEMORAL PHASIC: NORMAL
BH CV LOWER VASCULAR RIGHT COMMON FEMORAL SPONT: NORMAL
BH CV LOWER VASCULAR RIGHT DISTAL FEMORAL COMPRESS: NORMAL
BH CV LOWER VASCULAR RIGHT GASTRONEMIUS COMPRESS: NORMAL
BH CV LOWER VASCULAR RIGHT GREATER SAPH AK COMPRESS: NORMAL
BH CV LOWER VASCULAR RIGHT GREATER SAPH BK COMPRESS: NORMAL
BH CV LOWER VASCULAR RIGHT LESSER SAPH COMPRESS: NORMAL
BH CV LOWER VASCULAR RIGHT MID FEMORAL AUGMENT: NORMAL
BH CV LOWER VASCULAR RIGHT MID FEMORAL COMPRESS: NORMAL
BH CV LOWER VASCULAR RIGHT MID FEMORAL PHASIC: NORMAL
BH CV LOWER VASCULAR RIGHT MID FEMORAL SPONT: NORMAL
BH CV LOWER VASCULAR RIGHT PERONEAL COMPRESS: NORMAL
BH CV LOWER VASCULAR RIGHT POPLITEAL AUGMENT: NORMAL
BH CV LOWER VASCULAR RIGHT POPLITEAL COMPRESS: NORMAL
BH CV LOWER VASCULAR RIGHT POPLITEAL PHASIC: NORMAL
BH CV LOWER VASCULAR RIGHT POPLITEAL SPONT: NORMAL
BH CV LOWER VASCULAR RIGHT POSTERIOR TIBIAL COMPRESS: NORMAL
BH CV LOWER VASCULAR RIGHT PROFUNDA FEMORAL COMPRESS: NORMAL
BH CV LOWER VASCULAR RIGHT PROXIMAL FEMORAL COMPRESS: NORMAL
BH CV LOWER VASCULAR RIGHT SAPHENOFEMORAL JUNCTION COMPRESS: NORMAL

## 2021-02-10 LAB
H PYLORI IGA SER-ACNC: <9 UNITS (ref 0–8.9)
H PYLORI IGG SER IA-ACNC: 0.18 INDEX VALUE (ref 0–0.79)
H PYLORI IGM SER-ACNC: <9 UNITS (ref 0–8.9)
T3 SERPL-MCNC: 120 NG/DL (ref 71–180)
T4 FREE SERPL-MCNC: 0.83 NG/DL (ref 0.82–1.77)
THYROPEROXIDASE AB SERPL-ACNC: <9 IU/ML (ref 0–34)
TSH SERPL DL<=0.005 MIU/L-ACNC: 6.37 UIU/ML (ref 0.45–4.5)

## 2021-02-11 DIAGNOSIS — E03.9 ACQUIRED HYPOTHYROIDISM: Primary | Chronic | ICD-10-CM

## 2021-02-11 RX ORDER — LEVOTHYROXINE SODIUM 0.05 MG/1
50 TABLET ORAL DAILY
Qty: 90 TABLET | Refills: 1 | Status: SHIPPED | OUTPATIENT
Start: 2021-02-11 | End: 2021-07-28

## 2021-02-25 ENCOUNTER — DOCUMENTATION (OUTPATIENT)
Dept: PHYSICAL THERAPY | Facility: HOSPITAL | Age: 56
End: 2021-02-25

## 2021-02-25 DIAGNOSIS — R07.89 CHEST WALL PAIN, CHRONIC: ICD-10-CM

## 2021-02-25 DIAGNOSIS — L91.0 KELOID SCAR OF SKIN: Primary | ICD-10-CM

## 2021-02-25 DIAGNOSIS — G89.29 CHEST WALL PAIN, CHRONIC: ICD-10-CM

## 2021-02-25 NOTE — THERAPY DISCHARGE NOTE
Outpatient Physical Therapy Lymphedema Discharge Summary       Patient Name: Maddi Betancur  : 1965  MRN: [id number]  Today's Date: 2021      Visit Date: 2021    Visit Dx:    ICD-10-CM ICD-9-CM   1. Keloid scar of skin  L91.0 701.4   2. Chest wall pain, chronic  R07.89 786.52    G89.29 338.29       Patient Active Problem List   Diagnosis   • Atopic rhinitis   • Asthma   • Depression   • Mixed hyperlipidemia   • Spina bifida occulta   • Sciatica associated with disorder of lumbosacral spine   • Atypical lobular hyperplasia (ALH) of left breast   • Vitamin D deficiency   • Osteoporosis of lumbar spine   • Gastroesophageal reflux disease   • Acquired hypothyroidism            PT OP Goals     Row Name 21 1412          PT Short Term Goals    STG Date to Achieve  21  -MW     STG 1  Pt to report decreased burning / tearing pain at night; able to sleep > 4hr without waking.   -MW     STG 1 Progress  Partially Met  -MW     STG 2  Keloid tissue to decrease at least 10%.   -MW     STG 2 Progress  Not Met  -MW     STG 3  Pt to report pain at less then 5 on average throughout the day.   -MW     STG 3 Progress  Partially Met  -MW     STG 4  Pt independent with self care HEP for skin.   -MW     STG 4 Progress  Met  -MW        Long Term Goals    LTG 1  Pt to report decreased burning / tearing pain at night; able to sleep > 6 hr without waking due to pain.   -MW     LTG 1 Progress  Progressing  -MW     LTG 2  Pt to report pain at less then 3 on average throughout the day.   -MW     LTG 2 Progress  Progressing  -MW       User Key  (r) = Recorded By, (t) = Taken By, (c) = Cosigned By    Initials Name Provider Type    Katelynn Calvillo, PT Physical Therapist                                    OP PT Discharge Summary  Date of Discharge: 21  Reason for Discharge: Patient/Caregiver request  Outcomes Achieved: Patient able to partially acheive established goals  Discharge Destination: Home  with home program  Discharge Instructions/Additional Comments: Pt reports Kinesiotape helps some at night; less pain and improved sleep. Otherwise little change. Will continue with KT at night and self care as previously completing. Request D/c at this time.      Katelynn Kiser, PT  2/25/2021

## 2021-03-10 ENCOUNTER — OFFICE VISIT (OUTPATIENT)
Dept: OBSTETRICS AND GYNECOLOGY | Facility: CLINIC | Age: 56
End: 2021-03-10

## 2021-03-10 VITALS
BODY MASS INDEX: 23.11 KG/M2 | DIASTOLIC BLOOD PRESSURE: 70 MMHG | WEIGHT: 125.6 LBS | HEIGHT: 62 IN | SYSTOLIC BLOOD PRESSURE: 110 MMHG

## 2021-03-10 DIAGNOSIS — Z01.419 ENCOUNTER FOR GYNECOLOGICAL EXAMINATION WITHOUT ABNORMAL FINDING: Primary | ICD-10-CM

## 2021-03-10 DIAGNOSIS — Z12.72 VAGINAL PAP SMEAR: ICD-10-CM

## 2021-03-10 PROCEDURE — 99396 PREV VISIT EST AGE 40-64: CPT | Performed by: PHYSICIAN ASSISTANT

## 2021-03-10 NOTE — PROGRESS NOTES
Subjective   Chief Complaint   Patient presents with   • Gynecologic Exam     Last pap done 3/320-WNL, MMG is due in May, No complaints       Maddi Betancur is a 55 y.o. year old  presenting to be seen for her annual gynecological exam.   She has had a hysterectomy but has both ovaries remaining.  She has no GYN complaints today.  She had a breast reduction last year and was found to have atypical lobular hyperplasia of the breast.  She is now followed by gynecologic oncology and has had breast MRI along with her mammograms.  Her next routine screening mammogram is due in May and she states she will have a breast MRI shortly thereafter.    Past Medical History:   Diagnosis Date   • Acid reflux    • Acquired hypothyroidism 2021   • Anemia    • Asthma    • Atypical lobular hyperplasia (ALH) of breast    • BMI 34.0-34.9,adult    • Cervical cancer (CMS/HCC)     stage 1 or less in early 20's (no chemo or radiation). All pap smears negative since   • Depression    • Keloid scar of skin 2019        Current Outpatient Medications:   •  azelastine (OPTIVAR) 0.05 % ophthalmic solution, Apply  to eye every 12 (twelve) hours., Disp: , Rfl:   •  B Complex Vitamins (VITAMIN B COMPLEX PO), Take  by mouth., Disp: , Rfl:   •  betamethasone valerate (VALISONE) 0.1 % cream, betamethasone valerate 0.1 % topical cream  As Directed, Disp: , Rfl:   •  buPROPion XL (WELLBUTRIN XL) 300 MG 24 hr tablet, TAKE 1 TABLET EVERY MORNING, Disp: 90 tablet, Rfl: 1  •  Calcium Carbonate-Vitamin D (TGT CALCIUM DIETARY SUPPLEMENT PO), , Disp: , Rfl:   •  [employer] FIBER GUMMIES PO, Take  by mouth., Disp: , Rfl:   •  EPIPEN 2-DONNA 0.3 MG/0.3ML solution auto-injector injection, use as directed for ALLERGIC REACTION, Disp: , Rfl: 0  •  fluticasone (FLONASE ALLERGY RELIEF) 50 MCG/ACT nasal spray, Flonase Allergy Relief 50 mcg/actuation nasal spray,suspension  Daily, Disp: , Rfl:   •  levothyroxine (Synthroid) 50 MCG tablet, Take 1 tablet by mouth  "Daily. Take on empty stomach and wait 30 minutes before eating or taking other medications., Disp: 90 tablet, Rfl: 1  •  Multiple Vitamins-Minerals (AIRBORNE PO), Take  by mouth., Disp: , Rfl:   •  raloxifene (Evista) 60 MG tablet, Take 1 tablet by mouth Daily., Disp: 90 tablet, Rfl: 4  •  rosuvastatin (CRESTOR) 10 MG tablet, TAKE 1 TABLET NIGHTLY      *GLENMARK MFR*, Disp: 90 tablet, Rfl: 3   Allergies   Allergen Reactions   • Prolia [Denosumab] Other (See Comments)     Severe joint pain      Past Surgical History:   Procedure Laterality Date   • BILATERAL BREAST REDUCTION  11/04/2019   • BUNIONECTOMY Left 11/26/2018   • DIAGNOSTIC LAPAROSCOPY     • EXCISION URETHRAL DIVERTICULUM FEMALE     • GANGLION CYST EXCISION     • HYSTERECTOMY     • REDUCTION MAMMAPLASTY Bilateral     11/2019.    • SUBTOTAL HYSTERECTOMY        Social History     Socioeconomic History   • Marital status:      Spouse name: Not on file   • Number of children: Not on file   • Years of education: Not on file   • Highest education level: Not on file   Tobacco Use   • Smoking status: Former Smoker   • Smokeless tobacco: Never Used   Vaping Use   • Vaping Use: Never used   Substance and Sexual Activity   • Alcohol use: No   • Drug use: No   • Sexual activity: Yes     Birth control/protection: Surgical     Comment: Hysterectomy      Family History   Problem Relation Age of Onset   • Lung cancer Mother    • Cervical cancer Mother    • Lymphoma Mother         non-Hodgkin's   • Hyperlipidemia Mother    • Hypertension Mother    • Breast cancer Maternal Aunt        Review of Systems   Constitutional: Negative for chills, diaphoresis and fever.   Gastrointestinal: Negative for abdominal pain, constipation, diarrhea, nausea and vomiting.   Genitourinary: Negative for difficulty urinating, dysuria, pelvic pain, vaginal bleeding and vaginal discharge.   All other systems reviewed and are negative.          Objective   /70   Ht 156.2 cm (61.5\")  "  Wt 57 kg (125 lb 9.6 oz)   Breastfeeding No   BMI 23.35 kg/m²     Physical Exam  Constitutional:       Appearance: Normal appearance. She is well-developed, well-groomed and normal weight.   Eyes:      General: Lids are normal.      Extraocular Movements: Extraocular movements intact.      Conjunctiva/sclera: Conjunctivae normal.   Chest:      Breasts: Breasts are symmetrical.         Right: Skin change present. No inverted nipple, mass, nipple discharge or tenderness.         Left: Skin change present. No inverted nipple, mass, nipple discharge or tenderness.      Comments: Keloid formation from breast reduction  Abdominal:      Palpations: Abdomen is soft.      Tenderness: There is no abdominal tenderness.   Genitourinary:     Labia:         Right: No rash, tenderness or lesion.         Left: No rash, tenderness or lesion.       Urethra: No prolapse, urethral pain, urethral swelling or urethral lesion.      Vagina: No vaginal discharge, tenderness or lesions.      Uterus: Absent.       Adnexa:         Right: No mass or tenderness.          Left: No mass or tenderness.        Comments: Pap done  Skin:     General: Skin is warm and dry.      Findings: No lesion or rash.   Neurological:      General: No focal deficit present.      Mental Status: She is alert and oriented to person, place, and time.   Psychiatric:         Attention and Perception: Attention normal.         Mood and Affect: Mood normal.         Speech: Speech normal.         Behavior: Behavior is cooperative.         Thought Content: Thought content normal.              Assessment and Plan  Diagnoses and all orders for this visit:    1. Encounter for gynecological examination without abnormal finding (Primary)    2. Vaginal Pap smear  -     Liquid-based Pap Smear, Screening; Future      Patient Instructions   Self breast exam monthly  Annual mammogram  Calcium 1200 mg daily and vit D 2000 mg daily  Regular excercise             This note was  electronically signed.    Ilsa Campo PA-C   March 10, 2021

## 2021-03-15 NOTE — PROGRESS NOTES
Subjective   Maddi Betancur is a 55 y.o. female.   Chief Complaint   Patient presents with   • Colonoscopy     constipation      History of Present Illness   Ms. Betancur is a 55-year-old female patient comes the office today to discuss colonoscopy.  She was initially referred as a screening exam, but it was noted on her previous visit telephone interview that she did have a several month history of constipation.  For this reason, she was asked to come to the office for evaluation.  She reports that she is taking stool softeners daily or every other day, without which she can go 7 days or more without having a bowel movement.  This has been ongoing for the last several months.  Previous to this that she reports having regular daily bowel movements without requiring any medical therapy.  She also reports sustaining an 80 pound weight loss with diet and exercise over the last several years.  This was intentional.  She denies having any bright red blood per rectum or melena.  She has never had a previous colonoscopy.  There is a family history of colon cancer in the patient's maternal aunt.    The following portions of the patient's history were reviewed and updated as appropriate: allergies, current medications, past family history, past medical history, past social history, past surgical history and problem list.    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 All pap smears negative since   • Depression    • Keloid scar of skin 11/2019       Past Surgical History:   Procedure Laterality Date   • BILATERAL BREAST REDUCTION  11/04/2019   • BLADDER SUSPENSION     • BUNIONECTOMY Left 11/26/2018   • BUNIONECTOMY Right 2020   • CLOSED MANIPULATION SHOULDER Right    • DIAGNOSTIC LAPAROSCOPY     • EXCISION URETHRAL DIVERTICULUM FEMALE     • GANGLION CYST EXCISION     • GYNECOLOGIC CRYOSURGERY      for cervical CA   • HYSTERECTOMY         Medications:     Current Outpatient Medications:   •  azelastine (OPTIVAR) 0.05 % ophthalmic solution, Apply  to eye every 12 (twelve) hours., Disp: , Rfl:   •  B Complex Vitamins (VITAMIN B COMPLEX PO), Take  by mouth., Disp: , Rfl:   •  betamethasone valerate (VALISONE) 0.1 % cream, betamethasone valerate 0.1 % topical cream  As Directed, Disp: , Rfl:   •  buPROPion XL (WELLBUTRIN XL) 300 MG 24 hr tablet, TAKE 1 TABLET EVERY MORNING, Disp: 90 tablet, Rfl: 1  •  Calcium Carbonate-Vitamin D (TGT CALCIUM DIETARY SUPPLEMENT PO), , Disp: , Rfl:   •  [employer] FIBER GUMMIES PO, Take  by mouth., Disp: , Rfl:   •  EPIPEN 2-DONNA 0.3 MG/0.3ML solution auto-injector injection, use as directed for ALLERGIC REACTION, Disp: , Rfl: 0  •  fluticasone (FLONASE ALLERGY RELIEF) 50 MCG/ACT nasal spray, Flonase Allergy Relief 50 mcg/actuation nasal spray,suspension  Daily, Disp: , Rfl:   •  levothyroxine (Synthroid) 50 MCG tablet, Take 1 tablet by mouth Daily. Take on empty stomach and wait 30 minutes before eating or taking other medications., Disp: 90 tablet, Rfl: 1  •  Multiple Vitamins-Minerals (AIRBORNE PO), Take  by mouth., Disp: , Rfl:   •  raloxifene (Evista) 60 MG tablet, Take 1 tablet by mouth Daily., Disp: 90 tablet, Rfl: 4  •  rosuvastatin (CRESTOR) 10 MG tablet, TAKE 1 TABLET NIGHTLY      *SLIMENMARK MFR*, Disp: 90 tablet, Rfl: 3  •  bisacodyl (bisacodyl) 5 MG EC tablet, Take 4 tablets  at 1:00pm with 8oz of clear liquids the day before your colonoscopy., Disp: 4 tablet, Rfl: 0  •  loratadine-pseudoephedrine (Claritin-D 24 Hour)  MG per 24 hr tablet, Take 1 tablet by mouth Daily., Disp: 14 tablet, Rfl: 0  •  polyethylene glycol (MIRALAX) 17 GM/SCOOP powder, Mix 238g powder with 64 oz of clear liquid at 4:00pm. Drink 8 oz every 10-15 minutes until consumed., Disp: 238 g, Rfl: 0    Allergies:   Allergies   Allergen Reactions   • Prolia [Denosumab] Other (See Comments)     Severe joint pain         Family History   Problem Relation Age of Onset   • Lung cancer Mother    • Cervical cancer Mother    • Lymphoma Mother         non-Hodgkin's   • Hyperlipidemia Mother    • Hypertension Mother    • Breast cancer Maternal Aunt    • Colon cancer Maternal Aunt         40s       Social History     Socioeconomic History   • Marital status:      Spouse name: Not on file   • Number of children: Not on file   • Years of education: Not on file   • Highest education level: Not on file   Tobacco Use   • Smoking status: Former Smoker   • Smokeless tobacco: Never Used   • Tobacco comment: quit more than 20 years ago   Vaping Use   • Vaping Use: Never used   Substance and Sexual Activity   • Alcohol use: No   • Drug use: No   • Sexual activity: Yes     Birth control/protection: Surgical     Comment: Hysterectomy       Review of Systems   Constitutional: Negative for chills, fever and unexpected weight change.   HENT: Negative for hearing loss, trouble swallowing and voice change.    Eyes: Negative for visual disturbance.   Respiratory: Negative for apnea, cough, chest tightness, shortness of breath and wheezing.    Cardiovascular: Negative for chest pain, palpitations and leg swelling.   Gastrointestinal: Positive for constipation. Negative for abdominal distention, abdominal pain, anal bleeding, blood in stool, diarrhea, nausea, rectal pain and vomiting.   Endocrine: Negative for cold intolerance and heat  "intolerance.   Genitourinary: Negative for difficulty urinating, dysuria and flank pain.   Musculoskeletal: Negative for back pain and gait problem.   Skin: Negative for color change, rash and wound.   Neurological: Negative for dizziness, syncope, speech difficulty, weakness, light-headedness, numbness and headaches.   Hematological: Negative for adenopathy. Does not bruise/bleed easily.   Psychiatric/Behavioral: Negative for confusion. The patient is not nervous/anxious.      I have reviewed and confirmed the accuracy of the ROS as documented by the MA/LPN/RN Tasha Vallejo MD      Objective    /72   Pulse 64   Temp 97.7 °F (36.5 °C)   Ht 156.2 cm (61.5\")   Wt 57.5 kg (126 lb 12.8 oz)   SpO2 98%   BMI 23.57 kg/m²     Physical Exam  Constitutional:       Appearance: She is well-developed.   HENT:      Head: Normocephalic and atraumatic.   Eyes:      General: No scleral icterus.  Cardiovascular:      Rate and Rhythm: Regular rhythm.   Pulmonary:      Effort: Pulmonary effort is normal.   Abdominal:      General: There is no distension.      Palpations: Abdomen is soft.      Tenderness: There is no abdominal tenderness.   Skin:     General: Skin is warm and dry.   Neurological:      Mental Status: She is alert and oriented to person, place, and time.   Psychiatric:         Behavior: Behavior normal.         Assessment/Plan   Diagnoses and all orders for this visit:    1. Constipation, unspecified constipation type (Primary)    2. Change in bowel habits    3. Colon cancer screening      I have discussed with Ms. Betancur the need for colonoscopy for both the purposes of colon cancer screening, and for further diagnostic evaluation of constipation, which represents a change in her bowel habits.  We discussed the indications for colonoscopy as well as the risks, benefits and alternatives to this procedure. Risks including but not limited to perforation, bleeding,need for blood transfusion or emergent " surgery ,and missed neoplasm were reviewed in detail with the patient. The necessary bowel preparation and pre-procedure clear liquid diet was explained in detail.  A written instructional handout was also provided.  Electronic prescriptions for miralax and dulcolax were sent to the patient's pharmacy.  The patient was given an opportunity to ask questions.  The patient verbalized understanding of these recommendations and the plan of care. The patient is willing to proceed with colonoscopy and has signed informed consent in the office today.  My office will arrange scheduling for the colonoscopy procedure and pre-admission testing.

## 2021-03-16 ENCOUNTER — OFFICE VISIT (OUTPATIENT)
Dept: SURGERY | Facility: CLINIC | Age: 56
End: 2021-03-16

## 2021-03-16 VITALS
WEIGHT: 126.8 LBS | OXYGEN SATURATION: 98 % | HEIGHT: 62 IN | HEART RATE: 64 BPM | DIASTOLIC BLOOD PRESSURE: 72 MMHG | SYSTOLIC BLOOD PRESSURE: 118 MMHG | BODY MASS INDEX: 23.34 KG/M2 | TEMPERATURE: 97.7 F

## 2021-03-16 DIAGNOSIS — R19.4 CHANGE IN BOWEL HABITS: ICD-10-CM

## 2021-03-16 DIAGNOSIS — K59.00 CONSTIPATION, UNSPECIFIED CONSTIPATION TYPE: Primary | ICD-10-CM

## 2021-03-16 DIAGNOSIS — Z12.11 COLON CANCER SCREENING: ICD-10-CM

## 2021-03-16 PROCEDURE — 99203 OFFICE O/P NEW LOW 30 MIN: CPT | Performed by: SURGERY

## 2021-03-16 RX ORDER — POLYETHYLENE GLYCOL 3350 17 G/17G
POWDER, FOR SOLUTION ORAL
Qty: 238 G | Refills: 0 | Status: SHIPPED | OUTPATIENT
Start: 2021-03-16 | End: 2021-03-17 | Stop reason: SDUPTHER

## 2021-03-16 RX ORDER — BISACODYL 5 MG
TABLET, DELAYED RELEASE (ENTERIC COATED) ORAL
Qty: 4 TABLET | Refills: 0 | Status: SHIPPED | OUTPATIENT
Start: 2021-03-16 | End: 2021-03-17 | Stop reason: SDUPTHER

## 2021-03-17 RX ORDER — BISACODYL 5 MG
TABLET, DELAYED RELEASE (ENTERIC COATED) ORAL
Qty: 4 TABLET | Refills: 0 | Status: SHIPPED | OUTPATIENT
Start: 2021-03-17 | End: 2021-04-02 | Stop reason: HOSPADM

## 2021-03-17 RX ORDER — POLYETHYLENE GLYCOL 3350 17 G/17G
POWDER, FOR SOLUTION ORAL
Qty: 238 G | Refills: 0 | Status: SHIPPED | OUTPATIENT
Start: 2021-03-17 | End: 2021-04-02 | Stop reason: HOSPADM

## 2021-03-19 DIAGNOSIS — Z01.818 PREOP TESTING: Primary | ICD-10-CM

## 2021-03-23 DIAGNOSIS — Z12.72 VAGINAL PAP SMEAR: ICD-10-CM

## 2021-03-24 ENCOUNTER — CLINICAL SUPPORT (OUTPATIENT)
Dept: INTERNAL MEDICINE | Facility: CLINIC | Age: 56
End: 2021-03-24

## 2021-03-24 DIAGNOSIS — Z23 NEED FOR PNEUMOCOCCAL VACCINATION: ICD-10-CM

## 2021-03-26 ENCOUNTER — PREP FOR SURGERY (OUTPATIENT)
Dept: OTHER | Facility: HOSPITAL | Age: 56
End: 2021-03-26

## 2021-03-26 DIAGNOSIS — Z12.11 COLON CANCER SCREENING: Primary | ICD-10-CM

## 2021-03-26 RX ORDER — SODIUM CHLORIDE, SODIUM LACTATE, POTASSIUM CHLORIDE, CALCIUM CHLORIDE 600; 310; 30; 20 MG/100ML; MG/100ML; MG/100ML; MG/100ML
50 INJECTION, SOLUTION INTRAVENOUS CONTINUOUS
Status: CANCELLED | OUTPATIENT
Start: 2021-03-26

## 2021-03-26 RX ORDER — SODIUM CHLORIDE 0.9 % (FLUSH) 0.9 %
10 SYRINGE (ML) INJECTION AS NEEDED
Status: CANCELLED | OUTPATIENT
Start: 2021-03-26

## 2021-03-26 RX ORDER — SODIUM CHLORIDE 0.9 % (FLUSH) 0.9 %
3 SYRINGE (ML) INJECTION EVERY 12 HOURS SCHEDULED
Status: CANCELLED | OUTPATIENT
Start: 2021-03-26

## 2021-03-29 ENCOUNTER — OFFICE VISIT (OUTPATIENT)
Dept: INTERNAL MEDICINE | Facility: CLINIC | Age: 56
End: 2021-03-29

## 2021-03-29 VITALS
BODY MASS INDEX: 23.26 KG/M2 | SYSTOLIC BLOOD PRESSURE: 112 MMHG | WEIGHT: 126.4 LBS | TEMPERATURE: 98.2 F | HEART RATE: 79 BPM | DIASTOLIC BLOOD PRESSURE: 70 MMHG | OXYGEN SATURATION: 98 % | HEIGHT: 62 IN

## 2021-03-29 DIAGNOSIS — J02.9 SORE THROAT: ICD-10-CM

## 2021-03-29 DIAGNOSIS — J30.89 ENVIRONMENTAL AND SEASONAL ALLERGIES: Primary | ICD-10-CM

## 2021-03-29 DIAGNOSIS — Z20.818 EXPOSURE TO STREP THROAT: ICD-10-CM

## 2021-03-29 PROBLEM — Z12.11 COLON CANCER SCREENING: Status: ACTIVE | Noted: 2021-03-29

## 2021-03-29 LAB
EXPIRATION DATE: NORMAL
INTERNAL CONTROL: NORMAL
Lab: NORMAL
S PYO AG THROAT QL: NEGATIVE

## 2021-03-29 PROCEDURE — 99214 OFFICE O/P EST MOD 30 MIN: CPT | Performed by: NURSE PRACTITIONER

## 2021-03-29 PROCEDURE — 87880 STREP A ASSAY W/OPTIC: CPT | Performed by: NURSE PRACTITIONER

## 2021-03-29 RX ORDER — LORATADINE AND PSEUDOEPHEDRINE SULFATE 10; 240 MG/1; MG/1
1 TABLET, EXTENDED RELEASE ORAL DAILY
Qty: 14 TABLET | Refills: 0 | Status: SHIPPED | OUTPATIENT
Start: 2021-03-29 | End: 2021-05-26

## 2021-03-29 NOTE — PROGRESS NOTES
"Date: 2021    Name: Maddi Betancur  : 1965    Chief Complaint:   Chief Complaint   Patient presents with   • Sore Throat     pts daughter was dx with strep today and pt states that she has been having post nasal drip x 2-3 days       HPI:  Maddi Betancur is a 55 y.o. female presents with concerns regarding strep.  Her adult daughter works in a , her 1-year-old granddaughter attends the same .  Daughter was diagnosed with strep throat today.  Patient has been experiencing nasal congestion, postnasal drip, irritated throat for 2 to 3 days.  She normally takes Flonase daily.  Has taken Sudafed in the past, with similar symptoms; effective.  Denies fever, chills, headache, dizziness, earache, sneezing, hoarseness, loss of smell or taste, cough, rash, nausea, vomiting, abdominal pain, diarrhea, fatigue.  Colonoscopy scheduled in 4 days.  She is concerned symptoms may delay procedure.  Covid testing scheduled in 2 days.    History:  The following portions of the patient's history were reviewed and updated as appropriate: allergies, current medications, past medical history, family history, surgical history, social history and problem list.     VS:  Vitals:    21 1356   BP: 112/70   Pulse: 79   Temp: 98.2 °F (36.8 °C)   TempSrc: Infrared   SpO2: 98%   Weight: 57.3 kg (126 lb 6.4 oz)   Height: 156.2 cm (61.5\")     Body mass index is 23.5 kg/m².    PE:  Physical Exam  Constitutional:       General: She is not in acute distress.     Appearance: She is well-developed. She is not ill-appearing.   HENT:      Head: Normocephalic.      Right Ear: Ear canal and external ear normal. Tympanic membrane is retracted.      Left Ear: Tympanic membrane, ear canal and external ear normal.      Nose: Mucosal edema and congestion present.      Right Sinus: No maxillary sinus tenderness or frontal sinus tenderness.      Left Sinus: No maxillary sinus tenderness or frontal sinus tenderness.      " Mouth/Throat:      Lips: Pink.      Mouth: Mucous membranes are moist.      Pharynx: Uvula midline.      Tonsils: No tonsillar exudate. 0 on the right. 0 on the left.      Comments: Posterior oropharyngeal cobblestoning  Eyes:      Conjunctiva/sclera: Conjunctivae normal.   Cardiovascular:      Rate and Rhythm: Normal rate and regular rhythm.      Heart sounds: Normal heart sounds.   Pulmonary:      Effort: Pulmonary effort is normal.      Breath sounds: Normal breath sounds.   Musculoskeletal:      Cervical back: Normal range of motion.   Lymphadenopathy:      Cervical: No cervical adenopathy.   Skin:     General: Skin is warm.      Capillary Refill: Capillary refill takes less than 2 seconds.   Neurological:      Mental Status: She is alert and oriented to person, place, and time.      Coordination: Coordination normal.      Gait: Gait normal.       Office Visit on 03/29/2021   Component Date Value Ref Range Status   • Rapid Strep A Screen 03/29/2021 Negative  Negative, VALID, INVALID, Not Performed Final   • Internal Control 03/29/2021 Passed  Passed Final   • Lot Number 03/29/2021 [id number]   Final   • Expiration Date 03/29/2021 10/23/2022   Final        Assessment/Plan:  Diagnoses and all orders for this visit:    1. Environmental and seasonal allergies (Primary)  -     loratadine-pseudoephedrine (Claritin-D 24 Hour)  MG per 24 hr tablet; Take 1 tablet by mouth Daily.  Dispense: 14 tablet; Refill: 0  - Stay well hydrated while taking this medication  - Avoid known triggers if possible  - Wash hands and face after exposure to known triggers  - HEPA filters in vacuum and HVAC  2. Sore throat  -     POCT rapid strep A  -     loratadine-pseudoephedrine (Claritin-D 24 Hour)  MG per 24 hr tablet; Take 1 tablet by mouth Daily.  Dispense: 14 tablet; Refill: 0  -     Beta Strep Culture, Throat - , Throat; Future  - Salt water gargle, prn sore throat  3. Exposure to strep throat  -     Beta Strep Culture,  Throat - , Throat; Future    Return for Next scheduled follow up.

## 2021-03-30 ENCOUNTER — LAB (OUTPATIENT)
Dept: LAB | Facility: HOSPITAL | Age: 56
End: 2021-03-30

## 2021-03-30 DIAGNOSIS — Z01.818 PREOP TESTING: ICD-10-CM

## 2021-03-30 PROCEDURE — U0004 COV-19 TEST NON-CDC HGH THRU: HCPCS

## 2021-03-30 PROCEDURE — C9803 HOPD COVID-19 SPEC COLLECT: HCPCS

## 2021-03-31 LAB — SARS-COV-2 RNA NOSE QL NAA+PROBE: NOT DETECTED

## 2021-04-01 LAB — S PYO THROAT QL CULT: NEGATIVE

## 2021-04-02 ENCOUNTER — HOSPITAL ENCOUNTER (OUTPATIENT)
Facility: HOSPITAL | Age: 56
Setting detail: HOSPITAL OUTPATIENT SURGERY
Discharge: HOME | End: 2021-04-02
Attending: SURGERY | Admitting: SURGERY

## 2021-04-02 ENCOUNTER — ANESTHESIA EVENT (OUTPATIENT)
Dept: GASTROENTEROLOGY | Facility: HOSPITAL | Age: 56
End: 2021-04-02

## 2021-04-02 ENCOUNTER — ANESTHESIA (OUTPATIENT)
Dept: GASTROENTEROLOGY | Facility: HOSPITAL | Age: 56
End: 2021-04-02

## 2021-04-02 VITALS
OXYGEN SATURATION: 99 % | WEIGHT: 126 LBS | DIASTOLIC BLOOD PRESSURE: 69 MMHG | HEIGHT: 60 IN | TEMPERATURE: 97.2 F | BODY MASS INDEX: 24.74 KG/M2 | RESPIRATION RATE: 16 BRPM | SYSTOLIC BLOOD PRESSURE: 123 MMHG | HEART RATE: 61 BPM

## 2021-04-02 DIAGNOSIS — Z12.11 COLON CANCER SCREENING: ICD-10-CM

## 2021-04-02 PROCEDURE — 45385 COLONOSCOPY W/LESION REMOVAL: CPT | Performed by: SURGERY

## 2021-04-02 PROCEDURE — 25010000002 PROPOFOL 200 MG/20ML EMULSION: Performed by: NURSE ANESTHETIST, CERTIFIED REGISTERED

## 2021-04-02 PROCEDURE — 45380 COLONOSCOPY AND BIOPSY: CPT | Performed by: SURGERY

## 2021-04-02 RX ORDER — SODIUM CHLORIDE 0.9 % (FLUSH) 0.9 %
3 SYRINGE (ML) INJECTION EVERY 12 HOURS SCHEDULED
Status: DISCONTINUED | OUTPATIENT
Start: 2021-04-02 | End: 2021-04-02 | Stop reason: HOSPADM

## 2021-04-02 RX ORDER — SODIUM CHLORIDE, SODIUM LACTATE, POTASSIUM CHLORIDE, CALCIUM CHLORIDE 600; 310; 30; 20 MG/100ML; MG/100ML; MG/100ML; MG/100ML
50 INJECTION, SOLUTION INTRAVENOUS CONTINUOUS
Status: DISCONTINUED | OUTPATIENT
Start: 2021-04-02 | End: 2021-04-02 | Stop reason: HOSPADM

## 2021-04-02 RX ORDER — ONDANSETRON 2 MG/ML
4 INJECTION INTRAMUSCULAR; INTRAVENOUS ONCE AS NEEDED
Status: DISCONTINUED | OUTPATIENT
Start: 2021-04-02 | End: 2021-04-02 | Stop reason: HOSPADM

## 2021-04-02 RX ORDER — SODIUM CHLORIDE 0.9 % (FLUSH) 0.9 %
10 SYRINGE (ML) INJECTION AS NEEDED
Status: DISCONTINUED | OUTPATIENT
Start: 2021-04-02 | End: 2021-04-02 | Stop reason: HOSPADM

## 2021-04-02 RX ORDER — PROPOFOL 10 MG/ML
INJECTION, EMULSION INTRAVENOUS CONTINUOUS PRN
Status: DISCONTINUED | OUTPATIENT
Start: 2021-04-02 | End: 2021-04-02 | Stop reason: SURG

## 2021-04-02 RX ORDER — KETAMINE HYDROCHLORIDE 50 MG/ML
INJECTION, SOLUTION, CONCENTRATE INTRAMUSCULAR; INTRAVENOUS AS NEEDED
Status: DISCONTINUED | OUTPATIENT
Start: 2021-04-02 | End: 2021-04-02 | Stop reason: SURG

## 2021-04-02 RX ORDER — LIDOCAINE HYDROCHLORIDE 20 MG/ML
INJECTION, SOLUTION INTRAVENOUS AS NEEDED
Status: DISCONTINUED | OUTPATIENT
Start: 2021-04-02 | End: 2021-04-02 | Stop reason: SURG

## 2021-04-02 RX ADMIN — KETAMINE HYDROCHLORIDE 25 MG: 50 INJECTION, SOLUTION INTRAMUSCULAR; INTRAVENOUS at 10:48

## 2021-04-02 RX ADMIN — SODIUM CHLORIDE, POTASSIUM CHLORIDE, SODIUM LACTATE AND CALCIUM CHLORIDE 50 ML/HR: 600; 310; 30; 20 INJECTION, SOLUTION INTRAVENOUS at 09:20

## 2021-04-02 RX ADMIN — PROPOFOL 100 MCG/KG/MIN: 10 INJECTION, EMULSION INTRAVENOUS at 10:48

## 2021-04-02 RX ADMIN — LIDOCAINE HYDROCHLORIDE 100 MG: 20 INJECTION, SOLUTION INTRAVENOUS at 10:48

## 2021-04-02 RX ADMIN — PROPOFOL 100 MG: 10 INJECTION, EMULSION INTRAVENOUS at 10:49

## 2021-04-02 NOTE — ANESTHESIA POSTPROCEDURE EVALUATION
Patient: Maddi Betancur    Procedure Summary     Date: 04/02/21 Room / Location: [facility] ENDOSCOPY 2 / [facility] ENDOSCOPY    Anesthesia Start: 1046 Anesthesia Stop: 1124    Procedure: COLONOSCOPY (N/A ) Diagnosis:       Colon cancer screening      (Colon cancer screening [Z12.11])    Surgeons: Tsaha Vallejo MD Provider: Anuel Maurer CRNA    Anesthesia Type: MAC ASA Status: 2          Anesthesia Type: MAC    Vitals  Vitals Value Taken Time   /57 04/02/21 1128   Temp 97.2 °F (36.2 °C) 04/02/21 1128   Pulse 68 04/02/21 1128   Resp 16 04/02/21 1128   SpO2 99 % 04/02/21 1128           Post Anesthesia Care and Evaluation    Patient location during evaluation: bedside  Patient participation: complete - patient participated  Level of consciousness: awake and alert  Pain management: satisfactory to patient  Airway patency: patent  Anesthetic complications: No anesthetic complications  PONV Status: none  Cardiovascular status: acceptable and hemodynamically stable  Respiratory status: acceptable  Hydration status: acceptable  No anesthesia care post op

## 2021-04-02 NOTE — ANESTHESIA PREPROCEDURE EVALUATION
Anesthesia Evaluation     Patient summary reviewed and Nursing notes reviewed   NPO Solid Status: > 8 hours  NPO Liquid Status: > 8 hours           Airway   Mallampati: I  TM distance: >3 FB  Neck ROM: full  No difficulty expected  Dental - normal exam   (+) partials    Pulmonary - normal exam   (+) asthma,  Cardiovascular - normal exam    (+) hyperlipidemia,       Neuro/Psych  (+) numbness, psychiatric history Depression,     GI/Hepatic/Renal/Endo    (+)  GERD,      Musculoskeletal (-) negative ROS    Abdominal  - normal exam    Bowel sounds: normal.   Substance History - negative use     OB/GYN negative ob/gyn ROS         Other      history of cancer                  Anesthesia Plan    ASA 2     MAC     intravenous induction     Anesthetic plan, all risks, benefits, and alternatives have been provided, discussed and informed consent has been obtained with: patient.    Plan discussed with attending.

## 2021-04-06 LAB
LAB AP CASE REPORT: NORMAL
PATH REPORT.FINAL DX SPEC: NORMAL

## 2021-04-08 ENCOUNTER — TRANSCRIBE ORDERS (OUTPATIENT)
Dept: ADMINISTRATIVE | Facility: HOSPITAL | Age: 56
End: 2021-04-08

## 2021-04-08 DIAGNOSIS — Z12.31 VISIT FOR SCREENING MAMMOGRAM: Primary | ICD-10-CM

## 2021-04-26 ENCOUNTER — HOSPITAL ENCOUNTER (OUTPATIENT)
Dept: GENERAL RADIOLOGY | Facility: HOSPITAL | Age: 56
Discharge: HOME | End: 2021-04-26
Admitting: PHYSICIAN ASSISTANT

## 2021-04-26 ENCOUNTER — OFFICE VISIT (OUTPATIENT)
Dept: INTERNAL MEDICINE | Facility: CLINIC | Age: 56
End: 2021-04-26

## 2021-04-26 VITALS
TEMPERATURE: 97.5 F | SYSTOLIC BLOOD PRESSURE: 120 MMHG | WEIGHT: 125 LBS | DIASTOLIC BLOOD PRESSURE: 72 MMHG | HEIGHT: 60 IN | HEART RATE: 73 BPM | BODY MASS INDEX: 24.54 KG/M2 | OXYGEN SATURATION: 98 %

## 2021-04-26 DIAGNOSIS — M62.89 TENSOR FASCIA LATA SYNDROME: ICD-10-CM

## 2021-04-26 DIAGNOSIS — R35.0 URINARY FREQUENCY: ICD-10-CM

## 2021-04-26 DIAGNOSIS — M25.552 LEFT HIP PAIN: ICD-10-CM

## 2021-04-26 DIAGNOSIS — R10.9 RIGHT FLANK PAIN: Primary | ICD-10-CM

## 2021-04-26 DIAGNOSIS — M79.671 ACUTE FOOT PAIN, RIGHT: ICD-10-CM

## 2021-04-26 LAB
BILIRUB BLD-MCNC: NEGATIVE MG/DL
CLARITY, POC: CLEAR
COLOR UR: YELLOW
GLUCOSE UR STRIP-MCNC: NEGATIVE MG/DL
KETONES UR QL: NEGATIVE
LEUKOCYTE EST, POC: NEGATIVE
NITRITE UR-MCNC: NEGATIVE MG/ML
PH UR: 6 [PH] (ref 5–8)
PROT UR STRIP-MCNC: NEGATIVE MG/DL
RBC # UR STRIP: NEGATIVE /UL
SP GR UR: 1.01 (ref 1–1.03)
UROBILINOGEN UR QL: NORMAL

## 2021-04-26 PROCEDURE — 73502 X-RAY EXAM HIP UNI 2-3 VIEWS: CPT

## 2021-04-26 PROCEDURE — 99214 OFFICE O/P EST MOD 30 MIN: CPT | Performed by: PHYSICIAN ASSISTANT

## 2021-04-26 PROCEDURE — 81003 URINALYSIS AUTO W/O SCOPE: CPT | Performed by: PHYSICIAN ASSISTANT

## 2021-04-26 NOTE — PROGRESS NOTES
Follow Up Office Visit      Patient Name: Maddi Betancur  : 1965   MRN: [id number]     Chief Complaint:    Chief Complaint   Patient presents with   • hip and rib pain       History of Present Illness: Maddi Betancur is a 55 y.o. female who is here today with concern of hip and rib pain.    She has been having right flank pain which can be bothersome while lying down or standing and walking. Pain has been present for a few months but has become more frequent. Not pleuritic and non-tender. She endorses some urinary urgency and urinary frequency for the last 6 months or more. No low back pain, lower abdominal pain, dysuria or hematuria. She is not sure if pain is worse after eating.     Left hip pain intermittently with tenderness over the joint when pain is present. Sometimes she has to ease into standing after sitting due to pain. Pain has been present for several months with increased frequency recently.     Friday the right medial foot was causing her extreme pain but then suddenly Saturday was completely fine. She had a stress fracture in the right ankle around 1 year ago.         Subjective      I have reviewed and the following portions of the patient's history were updated as appropriate: past family history, past medical history, past social history, past surgical history and problem list.      Current Outpatient Medications:   •  azelastine (OPTIVAR) 0.05 % ophthalmic solution, Apply 1 drop to eye(s) as directed by provider Every 12 (Twelve) Hours. As needed, Disp: , Rfl:   •  B Complex Vitamins (VITAMIN B COMPLEX PO), Take 1 tablet by mouth Daily., Disp: , Rfl:   •  betamethasone valerate (VALISONE) 0.1 % cream, Apply 1 application topically to the appropriate area as directed Daily. As needed for dry patchy areas, Disp: , Rfl:   •  buPROPion XL (WELLBUTRIN XL) 300 MG 24 hr tablet, TAKE 1 TABLET EVERY MORNING, Disp: 90 tablet, Rfl: 1  •  Calcium Carbonate-Vitamin D (TGT CALCIUM DIETARY  "SUPPLEMENT PO), Take 600 mg by mouth Daily., Disp: , Rfl:   •  [employer] FIBER GUMMIES PO, Take 2 tablets by mouth Daily. Off tablets this week due to bowel prep, Disp: , Rfl:   •  EPIPEN 2-DONNA 0.3 MG/0.3ML solution auto-injector injection, Inject 0.3 mg into the appropriate muscle as directed by prescriber 1 (One) Time. As needed, Disp: , Rfl: 0  •  fluticasone (FLONASE ALLERGY RELIEF) 50 MCG/ACT nasal spray, 2 sprays into the nostril(s) as directed by provider Daily. Uses prn, Disp: , Rfl:   •  levothyroxine (Synthroid) 50 MCG tablet, Take 1 tablet by mouth Daily. Take on empty stomach and wait 30 minutes before eating or taking other medications., Disp: 90 tablet, Rfl: 1  •  loratadine-pseudoephedrine (Claritin-D 24 Hour)  MG per 24 hr tablet, Take 1 tablet by mouth Daily. (Patient taking differently: Take 1 tablet by mouth Daily. As needed.), Disp: 14 tablet, Rfl: 0  •  Multiple Vitamins-Minerals (AIRBORNE PO), Take 1 tablet by mouth Daily., Disp: , Rfl:   •  raloxifene (Evista) 60 MG tablet, Take 1 tablet by mouth Daily., Disp: 90 tablet, Rfl: 4  •  rosuvastatin (CRESTOR) 10 MG tablet, TAKE 1 TABLET NIGHTLY      *GLENMARK MFR* (Patient taking differently: Take 10 mg by mouth Daily.), Disp: 90 tablet, Rfl: 3    Allergies   Allergen Reactions   • Prolia [Denosumab] Other (See Comments)     Severe joint pain       Objective     Physical Exam:  Vital Signs:   Vitals:    04/26/21 0806   BP: 120/72   Pulse: 73   Temp: 97.5 °F (36.4 °C)   SpO2: 98%   Weight: 56.7 kg (125 lb)   Height: 152.9 cm (60.2\")     Body mass index is 24.25 kg/m².    Physical Exam  Vitals and nursing note reviewed.   Constitutional:       General: She is not in acute distress.     Appearance: Normal appearance. She is well-developed and normal weight. She is not ill-appearing, toxic-appearing or diaphoretic.   HENT:      Head: Normocephalic and atraumatic.      Right Ear: External ear normal.      Left Ear: External ear normal.   Eyes:      " General: No scleral icterus.     Extraocular Movements: Extraocular movements intact.      Conjunctiva/sclera: Conjunctivae normal.      Pupils: Pupils are equal, round, and reactive to light.   Cardiovascular:      Rate and Rhythm: Normal rate and regular rhythm.      Heart sounds: Normal heart sounds. No murmur heard.   No friction rub. No gallop.    Pulmonary:      Effort: Pulmonary effort is normal. No respiratory distress.      Breath sounds: Normal breath sounds. No wheezing, rhonchi or rales.   Chest:      Chest wall: No tenderness.   Abdominal:      General: Bowel sounds are normal. There is no distension.      Palpations: Abdomen is soft. There is no mass.      Tenderness: There is abdominal tenderness in the right upper quadrant. There is right CVA tenderness. There is no left CVA tenderness, guarding or rebound. Negative signs include Thompson's sign, Rovsing's sign, McBurney's sign, psoas sign and obturator sign.   Musculoskeletal:         General: Normal range of motion.      Cervical back: Normal range of motion and neck supple.      Thoracic back: Bony tenderness ( Right CVA) present. No swelling, edema or lacerations. Normal range of motion.        Back:       Right hip: No deformity or bony tenderness. Normal range of motion. Normal strength.      Left hip: Tenderness present. No deformity or bony tenderness. Normal range of motion. Normal strength.      Right lower leg: No edema.      Left lower leg: No edema.      Right foot: Normal range of motion. Deformity present. No swelling, laceration, tenderness, bony tenderness or crepitus.        Legs:    Lymphadenopathy:      Cervical: No cervical adenopathy.   Skin:     General: Skin is warm and dry.      Capillary Refill: Capillary refill takes less than 2 seconds.      Coloration: Skin is not jaundiced or pale.      Findings: No erythema or rash.   Neurological:      General: No focal deficit present.      Mental Status: She is alert and oriented to  person, place, and time.      Cranial Nerves: No cranial nerve deficit.      Sensory: No sensory deficit.      Motor: No abnormal muscle tone.      Coordination: Coordination normal.      Gait: Gait normal.      Deep Tendon Reflexes: Reflexes normal.   Psychiatric:         Mood and Affect: Mood normal.         Behavior: Behavior normal.         Thought Content: Thought content normal.         Judgment: Judgment normal.         Common labs    Common Labsle 7/27/20 7/27/20 2/2/21 2/2/21 2/2/21    0842 0842 0808 0808 0808   Glucose 98   92    BUN 10   7    Creatinine 0.81   0.72    eGFR Non  Am 73   84    eGFR African Am 89   102    Sodium 141   140    Potassium 4.4   4.2    Chloride 104   106    Calcium 10.0   9.2    Total Protein 6.6   6.3    Albumin 4.70   4.20    Total Bilirubin 0.3   0.3    Alkaline Phosphatase 59   59    AST (SGOT) 15   20    ALT (SGPT) 13   13    WBC  4.06 4.42     Hemoglobin  12.0 11.1 (A)     Hematocrit  39.6 34.6     Platelets  182 191     Total Cholesterol     133   Triglycerides     51   HDL Cholesterol     68 (A)   LDL Cholesterol      53   (A) Abnormal value       Comments are available for some flowsheets but are not being displayed.               Assessment / Plan      Assessment/Plan:   Diagnoses and all orders for this visit:    1. Right flank pain (Primary) (new, uncertain prognosis)  -     POC Urinalysis Dipstick, Automated  Brief Urine Lab Results  (Last result in the past 365 days)      Color   Clarity   Blood   Leuk Est   Nitrite   Protein   CREAT   Urine HCG        04/26/21 0852 Yellow Clear Negative Negative Negative Negative             -     Urine Culture - Urine, Urine, Clean Catch  Consider CT abdomen stone protocol if culture negative.    2. Left hip pain (new, uncomplicated)  -     XR Hip With or Without Pelvis 2 - 3 View Left    3. Tensor fascia clementine syndrome  Discussed stretches to complete at home.    4.  Acute right foot pain  Flat-footed with bony deformity  which was already done evaluated by podiatry.  Recommended she avoid walking barefoot through her home to prevent intermittent right foot pain.         Follow Up:   Return if symptoms worsen or fail to improve.    Patient was given instructions and counseling regarding her condition or for health maintenance advice. Please see specific information pulled into the AVS if appropriate.     Leonor Ambriz PA-C  [facility]

## 2021-04-28 LAB
BACTERIA UR CULT: NORMAL
BACTERIA UR CULT: NORMAL

## 2021-05-06 ENCOUNTER — HOSPITAL ENCOUNTER (OUTPATIENT)
Dept: MAMMOGRAPHY | Facility: HOSPITAL | Age: 56
Discharge: HOME | End: 2021-05-06
Admitting: NURSE PRACTITIONER

## 2021-05-06 DIAGNOSIS — Z12.31 VISIT FOR SCREENING MAMMOGRAM: ICD-10-CM

## 2021-05-06 PROCEDURE — 77063 BREAST TOMOSYNTHESIS BI: CPT

## 2021-05-06 PROCEDURE — 77063 BREAST TOMOSYNTHESIS BI: CPT | Performed by: RADIOLOGY

## 2021-05-06 PROCEDURE — 77067 SCR MAMMO BI INCL CAD: CPT

## 2021-05-06 PROCEDURE — 77067 SCR MAMMO BI INCL CAD: CPT | Performed by: RADIOLOGY

## 2021-05-11 ENCOUNTER — APPOINTMENT (OUTPATIENT)
Dept: CT IMAGING | Facility: HOSPITAL | Age: 56
End: 2021-05-11

## 2021-05-12 ENCOUNTER — HOSPITAL ENCOUNTER (OUTPATIENT)
Dept: CT IMAGING | Facility: HOSPITAL | Age: 56
Discharge: HOME | End: 2021-05-12
Admitting: PHYSICIAN ASSISTANT

## 2021-05-12 DIAGNOSIS — R35.0 URINARY FREQUENCY: ICD-10-CM

## 2021-05-12 DIAGNOSIS — R10.9 RIGHT FLANK PAIN: ICD-10-CM

## 2021-05-12 PROCEDURE — 74176 CT ABD & PELVIS W/O CONTRAST: CPT

## 2021-05-14 ENCOUNTER — TELEPHONE (OUTPATIENT)
Dept: INTERNAL MEDICINE | Facility: CLINIC | Age: 56
End: 2021-05-14

## 2021-05-14 NOTE — TELEPHONE ENCOUNTER
Patient called back regarding her CT, states she is still having the abdominal pain but it is in upper abdomen, not lower. States the pain is making it hard for her to sleep.

## 2021-05-14 NOTE — TELEPHONE ENCOUNTER
Discussed with patient. No known cause of RUQ pain. She cannot exclude association with eating. She will be having fried food for dinner tonight so will send me a [employer] message within the next couple of days to let me know if that meal makes symptoms worse in which case I will order HIDA scan.

## 2021-05-15 RX ORDER — OMEPRAZOLE 40 MG/1
40 CAPSULE, DELAYED RELEASE ORAL DAILY
Qty: 14 CAPSULE | Refills: 0 | Status: SHIPPED | OUTPATIENT
Start: 2021-05-15 | End: 2021-08-04 | Stop reason: HOSPADM

## 2021-05-19 ENCOUNTER — CLINICAL SUPPORT (OUTPATIENT)
Dept: INTERNAL MEDICINE | Facility: CLINIC | Age: 56
End: 2021-05-19

## 2021-05-19 DIAGNOSIS — Z23 NEED FOR PNEUMOCOCCAL VACCINATION: Primary | ICD-10-CM

## 2021-05-19 PROCEDURE — 90732 PPSV23 VACC 2 YRS+ SUBQ/IM: CPT | Performed by: PHYSICIAN ASSISTANT

## 2021-05-19 PROCEDURE — 90471 IMMUNIZATION ADMIN: CPT | Performed by: PHYSICIAN ASSISTANT

## 2021-05-24 DIAGNOSIS — R10.11 POSTPRANDIAL RUQ PAIN: Primary | ICD-10-CM

## 2021-05-24 RX ORDER — VALACYCLOVIR HYDROCHLORIDE 1 G/1
1000 TABLET, FILM COATED ORAL 3 TIMES DAILY
Qty: 21 TABLET | Refills: 0 | Status: SHIPPED | OUTPATIENT
Start: 2021-05-24 | End: 2021-05-31

## 2021-05-25 NOTE — PROGRESS NOTES
"Date: 2021    Name: Maddi Betancur  : 1965    Chief Complaint:   Chief Complaint   Patient presents with   • Rash     on upper lip, pt states she got her shingles vaccine on Thursday and woke up with this on Monday       HPI:  Maddi Betancur is a 55 y.o. female presents with a sore under her nose that has been present for a week. Initially noted an irritated spot at the right corner of her mouth after eating tomatillos.  She has a history of oral lesions after eating spicy foods.  She noticed blisters beneath left nostril the following day.  Associated with burning pain.  These had been present for 3 days when she contacted clinic, was prescribed valacyclovir on 2021.  She has been taking it as prescribed.  Lesion on right lip has completely healed, has irritation inside the right lower lip.  Blisters below the left nostril are not as painful, 1 lesion inside her nostril continues to cause pain.  No fever, chills, fatigue, malaise, myalgia. Received 2nd Shingrix vaccine 4 days prior to first lesion.  No close contacts with similar symptoms.      History:  The following portions of the patient's history were reviewed and updated as appropriate: allergies, current medications, past medical history, family history, surgical history, social history and problem list.     VS:  Vitals:    21 0803   BP: 118/72   Pulse: 63   Temp: 97.7 °F (36.5 °C)   TempSrc: Infrared   SpO2: 99%   Weight: 56.5 kg (124 lb 9.6 oz)   Height: 152.9 cm (60.2\")     Body mass index is 24.17 kg/m².    PE:  Physical Exam  Constitutional:       Appearance: She is not ill-appearing.   HENT:      Head: Normocephalic.      Right Ear: External ear normal.      Left Ear: External ear normal.   Eyes:      Conjunctiva/sclera: Conjunctivae normal.      Pupils: Pupils are equal, round, and reactive to light.   Cardiovascular:      Rate and Rhythm: Normal rate and regular rhythm.      Pulses: Normal pulses.      Heart sounds: " Normal heart sounds.   Pulmonary:      Effort: Pulmonary effort is normal.      Breath sounds: Normal breath sounds.   Musculoskeletal:      Cervical back: Normal range of motion and neck supple.   Skin:     General: Skin is warm.      Capillary Refill: Capillary refill takes less than 2 seconds.      Comments: Vesicular rash noted immediately below left nare, 1 vesicle noted inside nare.  Dry skin noted on upper right lip, erythema noted inside right lower lip.   Neurological:      Mental Status: She is alert and oriented to person, place, and time.      Coordination: Coordination normal.      Gait: Gait normal.   Psychiatric:         Mood and Affect: Mood normal.         Behavior: Behavior normal.         Thought Content: Thought content normal.         Assessment/Plan:  Diagnoses and all orders for this visit:    1. Herpes zoster without complication (Primary)        - Continue taking valacyclovir as prescribed        - May use topical lidocaine preparations, as needed for pain, particularly within the left nare.    Return if symptoms worsen or fail to improve.

## 2021-05-26 ENCOUNTER — OFFICE VISIT (OUTPATIENT)
Dept: INTERNAL MEDICINE | Facility: CLINIC | Age: 56
End: 2021-05-26

## 2021-05-26 VITALS
DIASTOLIC BLOOD PRESSURE: 72 MMHG | SYSTOLIC BLOOD PRESSURE: 118 MMHG | HEART RATE: 63 BPM | TEMPERATURE: 97.7 F | BODY MASS INDEX: 24.46 KG/M2 | WEIGHT: 124.6 LBS | HEIGHT: 60 IN | OXYGEN SATURATION: 99 %

## 2021-05-26 DIAGNOSIS — B02.9 HERPES ZOSTER WITHOUT COMPLICATION: Primary | ICD-10-CM

## 2021-05-26 PROCEDURE — 99213 OFFICE O/P EST LOW 20 MIN: CPT | Performed by: NURSE PRACTITIONER

## 2021-05-27 ENCOUNTER — TELEPHONE (OUTPATIENT)
Dept: INTERNAL MEDICINE | Facility: CLINIC | Age: 56
End: 2021-05-27

## 2021-05-27 NOTE — TELEPHONE ENCOUNTER
Provider: LYDIA HEAD  Caller: NANCIE ANTONIO  Relationship to Patient: SELF  Phone Number: [phone]  Reason for Call: PATIENT CALLED AND STATED THAT [facility] CALLED TO MAKE AN APPT. SHE RETURNED THE CALL AND WAS ON HOLD FOR 25 MINS. SHE WANTS TO SEE IF THE OFFICE CAN CALL AND LEAVE A MESSAGE TO HAVE THEM CALL HER BACK

## 2021-06-15 ENCOUNTER — OFFICE VISIT (OUTPATIENT)
Dept: INTERNAL MEDICINE | Facility: CLINIC | Age: 56
End: 2021-06-15

## 2021-06-15 VITALS
SYSTOLIC BLOOD PRESSURE: 122 MMHG | HEIGHT: 60 IN | OXYGEN SATURATION: 99 % | TEMPERATURE: 98 F | HEART RATE: 74 BPM | WEIGHT: 122 LBS | DIASTOLIC BLOOD PRESSURE: 70 MMHG | BODY MASS INDEX: 23.95 KG/M2

## 2021-06-15 DIAGNOSIS — R05.9 COUGH: Primary | ICD-10-CM

## 2021-06-15 DIAGNOSIS — J30.9 ALLERGIC RHINITIS WITH POSTNASAL DRIP: ICD-10-CM

## 2021-06-15 DIAGNOSIS — R09.82 ALLERGIC RHINITIS WITH POSTNASAL DRIP: ICD-10-CM

## 2021-06-15 PROCEDURE — 99213 OFFICE O/P EST LOW 20 MIN: CPT | Performed by: PHYSICIAN ASSISTANT

## 2021-06-15 RX ORDER — ALBUTEROL SULFATE 90 UG/1
2 AEROSOL, METERED RESPIRATORY (INHALATION) EVERY 4 HOURS PRN
Qty: 8 G | Refills: 5 | Status: SHIPPED | OUTPATIENT
Start: 2021-06-15 | End: 2022-09-14 | Stop reason: SDUPTHER

## 2021-06-15 RX ORDER — AZELASTINE 1 MG/ML
2 SPRAY, METERED NASAL 2 TIMES DAILY PRN
Qty: 30 ML | Refills: 12 | Status: SHIPPED | OUTPATIENT
Start: 2021-06-15 | End: 2022-09-12

## 2021-06-15 NOTE — PROGRESS NOTES
Follow Up Office Visit      Patient Name: Maddi Betancur  : 1965   MRN: [id number]     Chief Complaint:    Chief Complaint   Patient presents with   • Earache   • Cough     congestion       History of Present Illness: Maddi Betancur is a 55 y.o. female who is here today with concern of around 1 week of rhinorrhea, postnasal drip and intermittent cough which at times has been associated with SOA. No chest tightness or wheezing. Cough is productive for clear sputum. The right ear started hurting and feeling full yesterday. She has been using Flonase daily, taking Benadryl at night and Claritin-D daily. No fever.           Subjective      I have reviewed and the following portions of the patient's history were updated as appropriate: past family history, past medical history, past social history, past surgical history and problem list.      Current Outpatient Medications:   •  azelastine (OPTIVAR) 0.05 % ophthalmic solution, Apply 1 drop to eye(s) as directed by provider Every 12 (Twelve) Hours. As needed, Disp: , Rfl:   •  B Complex Vitamins (VITAMIN B COMPLEX PO), Take 1 tablet by mouth Daily., Disp: , Rfl:   •  betamethasone valerate (VALISONE) 0.1 % cream, Apply 1 application topically to the appropriate area as directed Daily. As needed for dry patchy areas, Disp: , Rfl:   •  buPROPion XL (WELLBUTRIN XL) 300 MG 24 hr tablet, TAKE 1 TABLET EVERY MORNING, Disp: 90 tablet, Rfl: 1  •  Calcium Carbonate-Vitamin D (TGT CALCIUM DIETARY SUPPLEMENT PO), Take 600 mg by mouth Daily., Disp: , Rfl:   •  [employer] FIBER GUMMIES PO, Take 2 tablets by mouth Daily. Off tablets this week due to bowel prep, Disp: , Rfl:   •  EPIPEN 2-DONNA 0.3 MG/0.3ML solution auto-injector injection, Inject 0.3 mg into the appropriate muscle as directed by prescriber 1 (One) Time. As needed, Disp: , Rfl: 0  •  fluticasone (FLONASE ALLERGY RELIEF) 50 MCG/ACT nasal spray, 2 sprays into the nostril(s) as directed by provider Daily. Uses prn,  "Disp: , Rfl:   •  levothyroxine (Synthroid) 50 MCG tablet, Take 1 tablet by mouth Daily. Take on empty stomach and wait 30 minutes before eating or taking other medications., Disp: 90 tablet, Rfl: 1  •  Multiple Vitamins-Minerals (AIRBORNE PO), Take 1 tablet by mouth Daily., Disp: , Rfl:   •  omeprazole (priLOSEC) 40 MG capsule, Take 1 capsule by mouth Daily., Disp: 14 capsule, Rfl: 0  •  raloxifene (Evista) 60 MG tablet, Take 1 tablet by mouth Daily., Disp: 90 tablet, Rfl: 4  •  rosuvastatin (CRESTOR) 10 MG tablet, TAKE 1 TABLET NIGHTLY      *GLENMARK MFR* (Patient taking differently: Take 10 mg by mouth Daily.), Disp: 90 tablet, Rfl: 3  •  albuterol sulfate  (90 Base) MCG/ACT inhaler, Inhale 2 puffs Every 4 (Four) Hours As Needed for Wheezing or Shortness of Air., Disp: 8 g, Rfl: 5  •  azelastine (ASTELIN) 0.1 % nasal spray, 2 sprays into the nostril(s) as directed by provider 2 (Two) Times a Day As Needed for Rhinitis or Allergies., Disp: 30 mL, Rfl: 12    Allergies   Allergen Reactions   • Prolia [Denosumab] Other (See Comments)     Severe joint pain       Objective     Physical Exam:  Vital Signs:   Vitals:    06/15/21 1419   BP: 122/70   Pulse: 74   Temp: 98 °F (36.7 °C)   SpO2: 99%   Weight: 55.3 kg (122 lb)   Height: 152.9 cm (60.2\")     Body mass index is 23.67 kg/m².    Physical Exam  Vitals and nursing note reviewed.   Constitutional:       General: She is not in acute distress.     Appearance: Normal appearance. She is well-developed and normal weight. She is not ill-appearing, toxic-appearing or diaphoretic.   HENT:      Head: Normocephalic and atraumatic.      Comments: Bilateral maxillary sinus tenderness.       Right Ear: Ear canal and external ear normal. There is no impacted cerumen.      Left Ear: Tympanic membrane, ear canal and external ear normal. There is no impacted cerumen.      Ears:      Comments: Right TM bulging without erythema or effusion.      Nose: Rhinorrhea present.      " Mouth/Throat:      Mouth: Mucous membranes are moist.      Pharynx: No oropharyngeal exudate or posterior oropharyngeal erythema.      Comments: Postnasal drip present.   Eyes:      General: No scleral icterus.        Right eye: No discharge.         Left eye: No discharge.      Extraocular Movements: Extraocular movements intact.      Conjunctiva/sclera: Conjunctivae normal.      Pupils: Pupils are equal, round, and reactive to light.   Cardiovascular:      Rate and Rhythm: Normal rate and regular rhythm.      Heart sounds: Normal heart sounds. No murmur heard.   No friction rub. No gallop.    Pulmonary:      Effort: Pulmonary effort is normal. No respiratory distress.      Breath sounds: Normal breath sounds. No stridor. No wheezing, rhonchi or rales.   Chest:      Chest wall: No tenderness.   Musculoskeletal:      Cervical back: Normal range of motion and neck supple. No rigidity or tenderness.   Lymphadenopathy:      Cervical: No cervical adenopathy.   Skin:     General: Skin is warm and dry.      Findings: No erythema or rash.   Neurological:      General: No focal deficit present.      Mental Status: She is alert and oriented to person, place, and time.      Cranial Nerves: No cranial nerve deficit.      Motor: No weakness.      Gait: Gait normal.   Psychiatric:         Mood and Affect: Mood normal.         Behavior: Behavior normal.         Thought Content: Thought content normal.         Judgment: Judgment normal.         Common labs    Common Labsle 7/27/20 7/27/20 2/2/21 2/2/21 2/2/21    0842 0842 0808 0808 0808   Glucose 98   92    BUN 10   7    Creatinine 0.81   0.72    eGFR Non  Am 73   84    eGFR African Am 89   102    Sodium 141   140    Potassium 4.4   4.2    Chloride 104   106    Calcium 10.0   9.2    Total Protein 6.6   6.3    Albumin 4.70   4.20    Total Bilirubin 0.3   0.3    Alkaline Phosphatase 59   59    AST (SGOT) 15   20    ALT (SGPT) 13   13    WBC  4.06 4.42     Hemoglobin  12.0  11.1 (A)     Hematocrit  39.6 34.6     Platelets  182 191     Total Cholesterol     133   Triglycerides     51   HDL Cholesterol     68 (A)   LDL Cholesterol      53   (A) Abnormal value       Comments are available for some flowsheets but are not being displayed.               Assessment / Plan      Assessment/Plan:   Diagnoses and all orders for this visit:    1. Cough (Primary)  -     albuterol sulfate  (90 Base) MCG/ACT inhaler; Inhale 2 puffs Every 4 (Four) Hours As Needed for Wheezing or Shortness of Air.  Dispense: 8 g; Refill: 5    2. Allergic rhinitis with postnasal drip  -     azelastine (ASTELIN) 0.1 % nasal spray; 2 sprays into the nostril(s) as directed by provider 2 (Two) Times a Day As Needed for Rhinitis or Allergies.  Dispense: 30 mL; Refill: 12       Continue Flonase daily. May continue Claritin-D prn for sinus pressure and ear pressure. Begin Astelin nasal spray.       Follow Up:   No follow-ups on file.    Patient was given instructions and counseling regarding her condition or for health maintenance advice. Please see specific information pulled into the AVS if appropriate.     Leonor Ambriz PA-C  [facility]     Please note that portions of this note may have been completed with a voice recognition program. Efforts were made to edit the dictations, but occasionally words are mistranscribed.

## 2021-06-24 DIAGNOSIS — M81.0 OSTEOPOROSIS OF LUMBAR SPINE: ICD-10-CM

## 2021-06-24 DIAGNOSIS — M80.80XA OTHER OSTEOPOROSIS WITH CURRENT PATHOLOGICAL FRACTURE, INITIAL ENCOUNTER: Primary | ICD-10-CM

## 2021-06-24 RX ORDER — TERIPARATIDE 250 UG/ML
20 INJECTION, SOLUTION SUBCUTANEOUS DAILY
Qty: 2.4 ML | Refills: 0 | Status: SHIPPED | OUTPATIENT
Start: 2021-06-24 | End: 2021-07-09 | Stop reason: SDUPTHER

## 2021-06-25 ENCOUNTER — PRIOR AUTHORIZATION (OUTPATIENT)
Dept: INTERNAL MEDICINE | Facility: CLINIC | Age: 56
End: 2021-06-25

## 2021-06-25 ENCOUNTER — HOSPITAL ENCOUNTER (OUTPATIENT)
Dept: ULTRASOUND IMAGING | Facility: HOSPITAL | Age: 56
Discharge: HOME | End: 2021-06-25
Admitting: PHYSICIAN ASSISTANT

## 2021-06-25 DIAGNOSIS — R10.11 POSTPRANDIAL RUQ PAIN: Primary | ICD-10-CM

## 2021-06-25 DIAGNOSIS — R10.11 POSTPRANDIAL RUQ PAIN: ICD-10-CM

## 2021-06-25 PROCEDURE — 76705 ECHO EXAM OF ABDOMEN: CPT

## 2021-06-25 NOTE — TELEPHONE ENCOUNTER
Key: [id number]  The plan will fax you a determination, typically within 1 to 5 business days.    [employer] 620MCG/2.48ML pen-injectors

## 2021-07-05 DIAGNOSIS — F34.1 DYSTHYMIA: ICD-10-CM

## 2021-07-06 RX ORDER — BUPROPION HYDROCHLORIDE 300 MG/1
TABLET ORAL
Qty: 90 TABLET | Refills: 3 | Status: SHIPPED | OUTPATIENT
Start: 2021-07-06 | End: 2022-06-23 | Stop reason: SDUPTHER

## 2021-07-09 DIAGNOSIS — M81.0 OSTEOPOROSIS OF LUMBAR SPINE: ICD-10-CM

## 2021-07-09 DIAGNOSIS — M80.80XA OTHER OSTEOPOROSIS WITH CURRENT PATHOLOGICAL FRACTURE, INITIAL ENCOUNTER: ICD-10-CM

## 2021-07-09 RX ORDER — TERIPARATIDE 250 UG/ML
20 INJECTION, SOLUTION SUBCUTANEOUS DAILY
Qty: 2.4 ML | Refills: 0 | Status: SHIPPED | OUTPATIENT
Start: 2021-07-09 | End: 2021-07-12 | Stop reason: SDUPTHER

## 2021-07-09 NOTE — TELEPHONE ENCOUNTER
Can you send this to [facility]? I sent the PA in, and Maddi said it will have to go through them.

## 2021-07-12 DIAGNOSIS — M80.80XA OTHER OSTEOPOROSIS WITH CURRENT PATHOLOGICAL FRACTURE, INITIAL ENCOUNTER: ICD-10-CM

## 2021-07-12 DIAGNOSIS — M81.0 OSTEOPOROSIS OF LUMBAR SPINE: ICD-10-CM

## 2021-07-12 RX ORDER — TERIPARATIDE 250 UG/ML
INJECTION, SOLUTION SUBCUTANEOUS
Qty: 2.4 ML | Refills: 0 | Status: SHIPPED | OUTPATIENT
Start: 2021-07-12 | End: 2021-07-12 | Stop reason: SDUPTHER

## 2021-07-12 RX ORDER — TERIPARATIDE 250 UG/ML
INJECTION, SOLUTION SUBCUTANEOUS
Qty: 2.4 ML | Refills: 0 | Status: SHIPPED | OUTPATIENT
Start: 2021-07-12 | End: 2021-07-30

## 2021-07-13 ENCOUNTER — HOSPITAL ENCOUNTER (OUTPATIENT)
Dept: NUCLEAR MEDICINE | Facility: HOSPITAL | Age: 56
Discharge: HOME | End: 2021-07-13

## 2021-07-13 DIAGNOSIS — R10.11 POSTPRANDIAL RUQ PAIN: ICD-10-CM

## 2021-07-13 PROCEDURE — 78227 HEPATOBIL SYST IMAGE W/DRUG: CPT

## 2021-07-13 PROCEDURE — 0 TECHNETIUM TC 99M MEBROFENIN KIT: Performed by: PHYSICIAN ASSISTANT

## 2021-07-13 PROCEDURE — 25010000002 SINCALIDE PER 5 MCG: Performed by: PHYSICIAN ASSISTANT

## 2021-07-13 PROCEDURE — A9537 TC99M MEBROFENIN: HCPCS | Performed by: PHYSICIAN ASSISTANT

## 2021-07-13 RX ORDER — KIT FOR THE PREPARATION OF TECHNETIUM TC 99M MEBROFENIN 45 MG/10ML
1 INJECTION, POWDER, LYOPHILIZED, FOR SOLUTION INTRAVENOUS
Status: COMPLETED | OUTPATIENT
Start: 2021-07-13 | End: 2021-07-13

## 2021-07-13 RX ADMIN — MEBROFENIN 1 DOSE: 45 INJECTION, POWDER, LYOPHILIZED, FOR SOLUTION INTRAVENOUS at 09:10

## 2021-07-13 RX ADMIN — SINCALIDE 1.1 MCG: 5 INJECTION, POWDER, LYOPHILIZED, FOR SOLUTION INTRAVENOUS at 10:00

## 2021-07-28 DIAGNOSIS — E03.9 ACQUIRED HYPOTHYROIDISM: Chronic | ICD-10-CM

## 2021-07-28 RX ORDER — LEVOTHYROXINE SODIUM 50 MCG
TABLET ORAL
Qty: 90 TABLET | Refills: 0 | Status: SHIPPED | OUTPATIENT
Start: 2021-07-28 | End: 2021-10-26

## 2021-07-30 DIAGNOSIS — M80.80XA OTHER OSTEOPOROSIS WITH CURRENT PATHOLOGICAL FRACTURE, INITIAL ENCOUNTER: ICD-10-CM

## 2021-07-30 DIAGNOSIS — M81.0 OSTEOPOROSIS OF LUMBAR SPINE: ICD-10-CM

## 2021-07-30 RX ORDER — TERIPARATIDE 250 UG/ML
INJECTION, SOLUTION SUBCUTANEOUS
Qty: 1 PEN | Refills: 11 | Status: SHIPPED | OUTPATIENT
Start: 2021-07-30 | End: 2022-05-18

## 2021-08-03 NOTE — H&P (VIEW-ONLY)
Subjective   Maddi Betancur is a 55 y.o. female.   Chief Complaint   Patient presents with   • Abdominal Pain     History of Present Illness   Ms. Betancur comes the office today for evaluation and management of epigastric and right upper quadrant abdominal pain for the last several months.  She describes this pain is stabbing, and radiating into the back and shoulder.  She describes her pain as being worse at night.  She also complains of increased symptoms of acid reflux.  She has associated nausea, and reports constipation.  She denies diarrhea.  She does think her symptoms worsen with spicy, and greasy foods.  There has been no vomiting.  She denies jaundice, acholic stools, or dark urine.    Her recent gallbladder ultrasound was unremarkable.  She went on to have a HIDA scan with CCK which demonstrated normal ejection fraction of 46%.  The radiology report indicates that the patient experienced nausea and pain during the examination.    The following portions of the patient's history were reviewed and updated as appropriate: allergies, current medications, past family history, past medical history, past social history, past surgical history and problem list.      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 All pap smears negative since    • Depression    • Keloid scar of skin 11/2019       Past Surgical History:   Procedure Laterality Date   • BILATERAL BREAST REDUCTION  11/04/2019   • BUNIONECTOMY Left 11/26/2018   • BUNIONECTOMY Right 2020   • CLOSED MANIPULATION SHOULDER Right    • COLONOSCOPY N/A 4/2/2021    Procedure: COLONOSCOPY with cold snare and cold biopsy polypectomies;  Surgeon: Tasha Vallejo MD;  Location: [facility] ENDOSCOPY;  Service: Gastroenterology;  Laterality: N/A;   • DIAGNOSTIC LAPAROSCOPY     • EXCISION URETHRAL DIVERTICULUM FEMALE     • EXPLORATORY LAPAROTOMY     • GANGLION CYST EXCISION     • GYNECOLOGIC CRYOSURGERY      for cervical CA   • HYSTERECTOMY     • REDUCTION MAMMAPLASTY     • RHINOPLASTY         Medications:     Current Outpatient Medications:   •  albuterol sulfate  (90 Base) MCG/ACT inhaler, Inhale 2 puffs Every 4 (Four) Hours As Needed for Wheezing or Shortness of Air., Disp: 8 g, Rfl: 5  •  azelastine (ASTELIN) 0.1 % nasal spray, 2 sprays into the nostril(s) as directed by provider 2 (Two) Times a Day As Needed for Rhinitis or Allergies., Disp: 30 mL, Rfl: 12  •  azelastine (OPTIVAR) 0.05 % ophthalmic solution, Apply 1 drop to eye(s) as directed by provider Every 12 (Twelve) Hours. As needed, Disp: , Rfl:   •  B Complex Vitamins (VITAMIN B COMPLEX PO), Take 1 tablet by mouth Daily., Disp: , Rfl:   •  betamethasone valerate (VALISONE) 0.1 % cream, Apply 1 application topically to the appropriate area as directed Daily. As needed for dry patchy areas, Disp: , Rfl:   •  buPROPion XL (WELLBUTRIN XL) 300 MG 24 hr tablet, TAKE 1 TABLET EVERY MORNING, Disp: 90 tablet, Rfl: 3  •  Calcium Carbonate-Vitamin D (TGT CALCIUM DIETARY SUPPLEMENT PO), Take 600 mg by mouth Daily., Disp: , Rfl:   •  [employer] FIBER GUMMIES PO, Take 2 tablets by mouth Daily. Off tablets this week due to bowel prep, Disp: , Rfl:   •  EPIPEN 2-DONNA 0.3 MG/0.3ML solution auto-injector injection, Inject 0.3 mg into the appropriate muscle as  directed by prescriber 1 (One) Time. As needed, Disp: , Rfl: 0  •  fluticasone (FLONASE ALLERGY RELIEF) 50 MCG/ACT nasal spray, 2 sprays into the nostril(s) as directed by provider Daily. Uses prn, Disp: , Rfl:   •  Multiple Vitamins-Minerals (AIRBORNE PO), Take 1 tablet by mouth Daily., Disp: , Rfl:   •  raloxifene (Evista) 60 MG tablet, Take 1 tablet by mouth Daily., Disp: 90 tablet, Rfl: 4  •  rosuvastatin (CRESTOR) 10 MG tablet, TAKE 1 TABLET NIGHTLY      *GLENMARK MFR* (Patient taking differently: Take 10 mg by mouth Daily.), Disp: 90 tablet, Rfl: 3  •  Synthroid 50 MCG tablet, TAKE 1 TABLET DAILY ON AN  EMPTY STOMACH AND WAIT 30  MINUTES BEFORE EATING OR   TAKING OTHER MEDICATIONS, Disp: 90 tablet, Rfl: 0  •  Teriparatide, Recombinant, (FORTEO) 620 MCG/2.48ML injection, INJECT 20 MCG UNDER THE SKIN 1 TIME A DAY. DISCARD PEN 28 DAYS AFTER INITIAL USE., Disp: 1 pen, Rfl: 11    Allergies:   Allergies   Allergen Reactions   • Prolia [Denosumab] Other (See Comments)     Severe joint pain         Family History   Problem Relation Age of Onset   • Lung cancer Mother    • Cervical cancer Mother    • Lymphoma Mother         non-Hodgkin's   • Hyperlipidemia Mother    • Hypertension Mother    • Breast cancer Maternal Aunt    • Colon cancer Maternal Aunt         40s       Social History     Socioeconomic History   • Marital status:      Spouse name: Not on file   • Number of children: Not on file   • Years of education: Not on file   • Highest education level: Not on file   Tobacco Use   • Smoking status: Former Smoker     Packs/day: 1.00     Years: 15.00     Pack years: 15.00     Types: Cigarettes     Quit date:      Years since quittin.8   • Smokeless tobacco: Never Used   • Tobacco comment: quit more than 20 years ago   Vaping Use   • Vaping Use: Never used   Substance and Sexual Activity   • Alcohol use: No   • Drug use: No   • Sexual activity: Yes     Partners: Male     Birth control/protection:  "Surgical     Comment: Hysterectomy       Review of Systems   Constitutional: Negative for chills, fever and unexpected weight change.   HENT: Negative for hearing loss, trouble swallowing and voice change.    Eyes: Negative for visual disturbance.   Respiratory: Negative for apnea, cough, chest tightness, shortness of breath and wheezing.    Cardiovascular: Negative for chest pain, palpitations and leg swelling.   Gastrointestinal: Positive for abdominal pain, constipation and nausea. Negative for abdominal distention, anal bleeding, blood in stool, diarrhea, rectal pain and vomiting.   Endocrine: Negative for cold intolerance and heat intolerance.   Genitourinary: Positive for frequency. Negative for difficulty urinating, dysuria and flank pain.   Musculoskeletal: Negative for back pain and gait problem.   Skin: Negative for color change, rash and wound.   Neurological: Negative for dizziness, syncope, speech difficulty, weakness, light-headedness, numbness and headaches.   Hematological: Negative for adenopathy. Does not bruise/bleed easily.   Psychiatric/Behavioral: Negative for confusion. The patient is not nervous/anxious.        Objective    /62   Pulse 77   Temp 98.8 °F (37.1 °C)   Ht 152.9 cm (60.2\")   Wt 56.2 kg (124 lb)   SpO2 99%   BMI 24.06 kg/m²     Physical Exam  Constitutional:       Appearance: She is well-developed.   HENT:      Head: Normocephalic and atraumatic.   Eyes:      General: No scleral icterus.  Cardiovascular:      Rate and Rhythm: Regular rhythm.   Pulmonary:      Effort: Pulmonary effort is normal.   Abdominal:      General: There is no distension.      Palpations: Abdomen is soft.      Tenderness: There is abdominal tenderness in the epigastric area.   Musculoskeletal:      Cervical back: Neck supple.   Skin:     General: Skin is warm and dry.   Neurological:      Mental Status: She is alert and oriented to person, place, and time.   Psychiatric:         Behavior: Behavior " normal.             Results Review:  I have reviewed the entirety of the patient's clinical lab results.  I have also personally reviewed the relevant patient imaging.     EXAMINATION: US GALLBLADDER- 06/25/2021     INDICATION: Postprandial right upper quadrant abdominal pain;  R10.11-Right upper quadrant pain      TECHNIQUE: Ultrasound right upper quadrant abdomen     COMPARISON: NONE     FINDINGS:      Visualized portions of the pancreas within normal limits.     Liver demonstrates homogeneity throughout the liver parenchyma with the  exception of a 6 mm hypoechoic to anechoic cystic focus without complex  features.     Gallbladder is present without cholelithiasis, gallbladder wall  thickening or pericholecystic fluid.     Common bile duct measures 6 mm in diameter at the level of the ranjit  hepatis within normal limits.     Right kidney measures 10.2 cm in length without evidence of  hydronephrosis, contour deforming mass or obvious calculi.     IMPRESSION:  No acute sonographic findings within the visualized right  upper quadrant. Subcentimeter simple appearing right hepatic cyst  without parenchymal process otherwise.     D:  06/25/2021  E:  06/25/2021         This report was finalized on 6/25/2021 6:26 PM by Dr. Rivera Marino.         PROCEDURE: NM HIDA SCAN WITH PHARMACOLOGICAL INTERVENTION-     HISTORY: postprandial RUQ pain; R10.11-Right upper quadrant pain     PROCEDURE: The patient was injected with 6.4 mCi of technetium Choletec  and 1.1 mcg of Kinevac. Images of the abdomen were obtained for 40  minutes.     FINDINGS: Hepatic uptake is normal. There is gallbladder and small bowel  activity at 30 minutes.     Post Kinevac images render an ejection fraction of  46 %. The patient  reported pain and nausea with CCK injection.     IMPRESSION:  Normal hepatobiliary scan with a gallbladder ejection  fraction of 46%.     This report was finalized on 7/13/2021 11:08 AM by Corie  GIOVANI Estes.        Assessment/Plan   Diagnoses and all orders for this visit:    1. Epigastric abdominal pain (Primary)  -     Case Request; Standing  -     lactated ringers infusion  -     Case Request    2. Nausea  -     Case Request; Standing  -     lactated ringers infusion  -     Case Request    3. RUQ abdominal pain  -     Case Request; Standing  -     lactated ringers infusion  -     Case Request    Other orders  -     Follow Anesthesia Guidelines / Standing Orders; Future  -     Provide NPO Instructions to Patient; Future  -     Chlorhexidine Skin Prep; Future  -     Follow Anesthesia Guidelines / Standing Orders; Standing  -     Verify NPO Status; Standing  -     Obtain Informed Consent; Standing  -     Verify / Perform Chlorhexidine Skin Prep; Standing  -     Verify / Perform Chlorhexidine Skin Prep if Indicated (If Not Already Completed); Standing  -     Notify Physician - Standard; Standing  -     pantoprazole (Protonix) 40 MG EC tablet; Take 1 tablet by mouth Daily.  Dispense: 30 tablet; Refill: 0    Ms. Gypsy is a 55-year-old female patient with right upper quadrant epigastric abdominal pain and nausea.  I had a detailed discussion with her in the office today about the possibility of underlying gallbladder dyskinesia/biliary dyskinesia.  Alternatively, we discussed the possibility that her symptoms may be due to some other cause such as uncontrolled gastroesophageal reflux, or peptic ulcer disease.  We discussed the potential for proceeding with cholecystectomy, understanding that in some patients this fails to improve their symptoms.  As an alternative, I have recommended that we first investigate pathology of the upper GI tract with EGD before proceeding with surgical intervention.  I have placed her on a PPI in the interim and instructed her regarding its use.  We will go ahead and schedule her for an upper GI endoscopy to evaluate for the pathology mentioned above.  If this is negative  with negative pathology results, we may consider proceeding with laparoscopic cholecystectomy.    We discussed EGD for diagnostic purposes..  We discussed the indications for upper endoscopy as well as the risks, benefits and alternatives to this procedure.   The patient was given an opportunity to ask questions.  The patient verbalized understanding of these recommendations and the plan of care. The patient is willing to proceed with endoscopy and has signed informed consent in the office today.  My office will arrange scheduling for the EGD procedure and pre-admission testing.

## 2021-08-03 NOTE — PROGRESS NOTES
Subjective   Maddi Betancur is a 55 y.o. female.   Chief Complaint   Patient presents with   • Abdominal Pain     History of Present Illness   Ms. Betancur comes the office today for evaluation and management of epigastric and right upper quadrant abdominal pain for the last several months.  She describes this pain is stabbing, and radiating into the back and shoulder.  She describes her pain as being worse at night.  She also complains of increased symptoms of acid reflux.  She has associated nausea, and reports constipation.  She denies diarrhea.  She does think her symptoms worsen with spicy, and greasy foods.  There has been no vomiting.  She denies jaundice, acholic stools, or dark urine.    Her recent gallbladder ultrasound was unremarkable.  She went on to have a HIDA scan with CCK which demonstrated normal ejection fraction of 46%.  The radiology report indicates that the patient experienced nausea and pain during the examination.    The following portions of the patient's history were reviewed and updated as appropriate: allergies, current medications, past family history, past medical history, past social history, past surgical history and problem list.      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 All pap smears negative since    • Depression    • Keloid scar of skin 11/2019       Past Surgical History:   Procedure Laterality Date   • BILATERAL BREAST REDUCTION  11/04/2019   • BUNIONECTOMY Left 11/26/2018   • BUNIONECTOMY Right 2020   • CLOSED MANIPULATION SHOULDER Right    • COLONOSCOPY N/A 4/2/2021    Procedure: COLONOSCOPY with cold snare and cold biopsy polypectomies;  Surgeon: Tasha Vallejo MD;  Location: [facility] ENDOSCOPY;  Service: Gastroenterology;  Laterality: N/A;   • DIAGNOSTIC LAPAROSCOPY     • EXCISION URETHRAL DIVERTICULUM FEMALE     • EXPLORATORY LAPAROTOMY     • GANGLION CYST EXCISION     • GYNECOLOGIC CRYOSURGERY      for cervical CA   • HYSTERECTOMY     • REDUCTION MAMMAPLASTY     • RHINOPLASTY         Medications:     Current Outpatient Medications:   •  albuterol sulfate  (90 Base) MCG/ACT inhaler, Inhale 2 puffs Every 4 (Four) Hours As Needed for Wheezing or Shortness of Air., Disp: 8 g, Rfl: 5  •  azelastine (ASTELIN) 0.1 % nasal spray, 2 sprays into the nostril(s) as directed by provider 2 (Two) Times a Day As Needed for Rhinitis or Allergies., Disp: 30 mL, Rfl: 12  •  azelastine (OPTIVAR) 0.05 % ophthalmic solution, Apply 1 drop to eye(s) as directed by provider Every 12 (Twelve) Hours. As needed, Disp: , Rfl:   •  B Complex Vitamins (VITAMIN B COMPLEX PO), Take 1 tablet by mouth Daily., Disp: , Rfl:   •  betamethasone valerate (VALISONE) 0.1 % cream, Apply 1 application topically to the appropriate area as directed Daily. As needed for dry patchy areas, Disp: , Rfl:   •  buPROPion XL (WELLBUTRIN XL) 300 MG 24 hr tablet, TAKE 1 TABLET EVERY MORNING, Disp: 90 tablet, Rfl: 3  •  Calcium Carbonate-Vitamin D (TGT CALCIUM DIETARY SUPPLEMENT PO), Take 600 mg by mouth Daily., Disp: , Rfl:   •  [employer] FIBER GUMMIES PO, Take 2 tablets by mouth Daily. Off tablets this week due to bowel prep, Disp: , Rfl:   •  EPIPEN 2-DONNA 0.3 MG/0.3ML solution auto-injector injection, Inject 0.3 mg into the appropriate muscle as  directed by prescriber 1 (One) Time. As needed, Disp: , Rfl: 0  •  fluticasone (FLONASE ALLERGY RELIEF) 50 MCG/ACT nasal spray, 2 sprays into the nostril(s) as directed by provider Daily. Uses prn, Disp: , Rfl:   •  Multiple Vitamins-Minerals (AIRBORNE PO), Take 1 tablet by mouth Daily., Disp: , Rfl:   •  raloxifene (Evista) 60 MG tablet, Take 1 tablet by mouth Daily., Disp: 90 tablet, Rfl: 4  •  rosuvastatin (CRESTOR) 10 MG tablet, TAKE 1 TABLET NIGHTLY      *GLENMARK MFR* (Patient taking differently: Take 10 mg by mouth Daily.), Disp: 90 tablet, Rfl: 3  •  Synthroid 50 MCG tablet, TAKE 1 TABLET DAILY ON AN  EMPTY STOMACH AND WAIT 30  MINUTES BEFORE EATING OR   TAKING OTHER MEDICATIONS, Disp: 90 tablet, Rfl: 0  •  Teriparatide, Recombinant, (FORTEO) 620 MCG/2.48ML injection, INJECT 20 MCG UNDER THE SKIN 1 TIME A DAY. DISCARD PEN 28 DAYS AFTER INITIAL USE., Disp: 1 pen, Rfl: 11    Allergies:   Allergies   Allergen Reactions   • Prolia [Denosumab] Other (See Comments)     Severe joint pain         Family History   Problem Relation Age of Onset   • Lung cancer Mother    • Cervical cancer Mother    • Lymphoma Mother         non-Hodgkin's   • Hyperlipidemia Mother    • Hypertension Mother    • Breast cancer Maternal Aunt    • Colon cancer Maternal Aunt         40s       Social History     Socioeconomic History   • Marital status:      Spouse name: Not on file   • Number of children: Not on file   • Years of education: Not on file   • Highest education level: Not on file   Tobacco Use   • Smoking status: Former Smoker     Packs/day: 1.00     Years: 15.00     Pack years: 15.00     Types: Cigarettes     Quit date:      Years since quittin.8   • Smokeless tobacco: Never Used   • Tobacco comment: quit more than 20 years ago   Vaping Use   • Vaping Use: Never used   Substance and Sexual Activity   • Alcohol use: No   • Drug use: No   • Sexual activity: Yes     Partners: Male     Birth control/protection:  "Surgical     Comment: Hysterectomy       Review of Systems   Constitutional: Negative for chills, fever and unexpected weight change.   HENT: Negative for hearing loss, trouble swallowing and voice change.    Eyes: Negative for visual disturbance.   Respiratory: Negative for apnea, cough, chest tightness, shortness of breath and wheezing.    Cardiovascular: Negative for chest pain, palpitations and leg swelling.   Gastrointestinal: Positive for abdominal pain, constipation and nausea. Negative for abdominal distention, anal bleeding, blood in stool, diarrhea, rectal pain and vomiting.   Endocrine: Negative for cold intolerance and heat intolerance.   Genitourinary: Positive for frequency. Negative for difficulty urinating, dysuria and flank pain.   Musculoskeletal: Negative for back pain and gait problem.   Skin: Negative for color change, rash and wound.   Neurological: Negative for dizziness, syncope, speech difficulty, weakness, light-headedness, numbness and headaches.   Hematological: Negative for adenopathy. Does not bruise/bleed easily.   Psychiatric/Behavioral: Negative for confusion. The patient is not nervous/anxious.        Objective    /62   Pulse 77   Temp 98.8 °F (37.1 °C)   Ht 152.9 cm (60.2\")   Wt 56.2 kg (124 lb)   SpO2 99%   BMI 24.06 kg/m²     Physical Exam  Constitutional:       Appearance: She is well-developed.   HENT:      Head: Normocephalic and atraumatic.   Eyes:      General: No scleral icterus.  Cardiovascular:      Rate and Rhythm: Regular rhythm.   Pulmonary:      Effort: Pulmonary effort is normal.   Abdominal:      General: There is no distension.      Palpations: Abdomen is soft.      Tenderness: There is abdominal tenderness in the epigastric area.   Musculoskeletal:      Cervical back: Neck supple.   Skin:     General: Skin is warm and dry.   Neurological:      Mental Status: She is alert and oriented to person, place, and time.   Psychiatric:         Behavior: Behavior " normal.             Results Review:  I have reviewed the entirety of the patient's clinical lab results.  I have also personally reviewed the relevant patient imaging.     EXAMINATION: US GALLBLADDER- 06/25/2021     INDICATION: Postprandial right upper quadrant abdominal pain;  R10.11-Right upper quadrant pain      TECHNIQUE: Ultrasound right upper quadrant abdomen     COMPARISON: NONE     FINDINGS:      Visualized portions of the pancreas within normal limits.     Liver demonstrates homogeneity throughout the liver parenchyma with the  exception of a 6 mm hypoechoic to anechoic cystic focus without complex  features.     Gallbladder is present without cholelithiasis, gallbladder wall  thickening or pericholecystic fluid.     Common bile duct measures 6 mm in diameter at the level of the ranjit  hepatis within normal limits.     Right kidney measures 10.2 cm in length without evidence of  hydronephrosis, contour deforming mass or obvious calculi.     IMPRESSION:  No acute sonographic findings within the visualized right  upper quadrant. Subcentimeter simple appearing right hepatic cyst  without parenchymal process otherwise.     D:  06/25/2021  E:  06/25/2021         This report was finalized on 6/25/2021 6:26 PM by Dr. Rivera Marino.         PROCEDURE: NM HIDA SCAN WITH PHARMACOLOGICAL INTERVENTION-     HISTORY: postprandial RUQ pain; R10.11-Right upper quadrant pain     PROCEDURE: The patient was injected with 6.4 mCi of technetium Choletec  and 1.1 mcg of Kinevac. Images of the abdomen were obtained for 40  minutes.     FINDINGS: Hepatic uptake is normal. There is gallbladder and small bowel  activity at 30 minutes.     Post Kinevac images render an ejection fraction of  46 %. The patient  reported pain and nausea with CCK injection.     IMPRESSION:  Normal hepatobiliary scan with a gallbladder ejection  fraction of 46%.     This report was finalized on 7/13/2021 11:08 AM by Corie  GIOVANI Estes.        Assessment/Plan   Diagnoses and all orders for this visit:    1. Epigastric abdominal pain (Primary)  -     Case Request; Standing  -     lactated ringers infusion  -     Case Request    2. Nausea  -     Case Request; Standing  -     lactated ringers infusion  -     Case Request    3. RUQ abdominal pain  -     Case Request; Standing  -     lactated ringers infusion  -     Case Request    Other orders  -     Follow Anesthesia Guidelines / Standing Orders; Future  -     Provide NPO Instructions to Patient; Future  -     Chlorhexidine Skin Prep; Future  -     Follow Anesthesia Guidelines / Standing Orders; Standing  -     Verify NPO Status; Standing  -     Obtain Informed Consent; Standing  -     Verify / Perform Chlorhexidine Skin Prep; Standing  -     Verify / Perform Chlorhexidine Skin Prep if Indicated (If Not Already Completed); Standing  -     Notify Physician - Standard; Standing  -     pantoprazole (Protonix) 40 MG EC tablet; Take 1 tablet by mouth Daily.  Dispense: 30 tablet; Refill: 0    Ms. Gypsy is a 55-year-old female patient with right upper quadrant epigastric abdominal pain and nausea.  I had a detailed discussion with her in the office today about the possibility of underlying gallbladder dyskinesia/biliary dyskinesia.  Alternatively, we discussed the possibility that her symptoms may be due to some other cause such as uncontrolled gastroesophageal reflux, or peptic ulcer disease.  We discussed the potential for proceeding with cholecystectomy, understanding that in some patients this fails to improve their symptoms.  As an alternative, I have recommended that we first investigate pathology of the upper GI tract with EGD before proceeding with surgical intervention.  I have placed her on a PPI in the interim and instructed her regarding its use.  We will go ahead and schedule her for an upper GI endoscopy to evaluate for the pathology mentioned above.  If this is negative  with negative pathology results, we may consider proceeding with laparoscopic cholecystectomy.    We discussed EGD for diagnostic purposes..  We discussed the indications for upper endoscopy as well as the risks, benefits and alternatives to this procedure.   The patient was given an opportunity to ask questions.  The patient verbalized understanding of these recommendations and the plan of care. The patient is willing to proceed with endoscopy and has signed informed consent in the office today.  My office will arrange scheduling for the EGD procedure and pre-admission testing.

## 2021-08-04 ENCOUNTER — OFFICE VISIT (OUTPATIENT)
Dept: SURGERY | Facility: CLINIC | Age: 56
End: 2021-08-04

## 2021-08-04 ENCOUNTER — LAB (OUTPATIENT)
Dept: LAB | Facility: HOSPITAL | Age: 56
End: 2021-08-04

## 2021-08-04 VITALS
HEART RATE: 77 BPM | WEIGHT: 124 LBS | SYSTOLIC BLOOD PRESSURE: 110 MMHG | OXYGEN SATURATION: 99 % | HEIGHT: 60 IN | TEMPERATURE: 98.8 F | DIASTOLIC BLOOD PRESSURE: 62 MMHG | BODY MASS INDEX: 24.35 KG/M2

## 2021-08-04 DIAGNOSIS — Z01.818 PREOP TESTING: ICD-10-CM

## 2021-08-04 DIAGNOSIS — R10.13 EPIGASTRIC ABDOMINAL PAIN: Primary | ICD-10-CM

## 2021-08-04 DIAGNOSIS — R10.11 RUQ ABDOMINAL PAIN: ICD-10-CM

## 2021-08-04 DIAGNOSIS — Z01.818 PREOP TESTING: Primary | ICD-10-CM

## 2021-08-04 DIAGNOSIS — R11.0 NAUSEA: ICD-10-CM

## 2021-08-04 PROCEDURE — C9803 HOPD COVID-19 SPEC COLLECT: HCPCS

## 2021-08-04 PROCEDURE — U0004 COV-19 TEST NON-CDC HGH THRU: HCPCS

## 2021-08-04 PROCEDURE — 99213 OFFICE O/P EST LOW 20 MIN: CPT | Performed by: SURGERY

## 2021-08-04 RX ORDER — PANTOPRAZOLE SODIUM 40 MG/1
40 TABLET, DELAYED RELEASE ORAL DAILY
Qty: 30 TABLET | Refills: 0 | Status: SHIPPED | OUTPATIENT
Start: 2021-08-04 | End: 2021-08-31

## 2021-08-04 RX ORDER — SODIUM CHLORIDE, SODIUM LACTATE, POTASSIUM CHLORIDE, CALCIUM CHLORIDE 600; 310; 30; 20 MG/100ML; MG/100ML; MG/100ML; MG/100ML
50 INJECTION, SOLUTION INTRAVENOUS CONTINUOUS
Status: CANCELLED | OUTPATIENT
Start: 2021-08-04

## 2021-08-04 RX ORDER — PANTOPRAZOLE SODIUM 40 MG/1
40 TABLET, DELAYED RELEASE ORAL DAILY
Qty: 90 TABLET | OUTPATIENT
Start: 2021-08-04

## 2021-08-04 NOTE — PRE-PROCEDURE INSTRUCTIONS
PAT phone history completed with pt for upcoming procedure on 8/6/21, with Dr. Vallejo.    PAT PASS GIVEN/REVIEWED WITH PT.  VERBALIZED UNDERSTANDING OF THE FOLLOWING:  DO NOT EAT, DRINK, SMOKE, USE SMOKELESS TOBACCO OR CHEW GUM AFTER MIDNIGHT THE NIGHT BEFORE SURGERY.  THIS ALSO INCLUDES HARD CANDIES AND MINTS.    DO NOT SHAVE THE AREA TO BE OPERATED ON AT LEAST 48 HOURS PRIOR TO THE PROCEDURE.  DO NOT WEAR MAKE UP OR NAIL POLISH.  DO NOT LEAVE IN ANY PIERCING OR WEAR JEWELRY THE DAY OF SURGERY.      DO NOT USE ADHESIVES IF YOU WEAR DENTURES.    DO NOT WEAR EYE CONTACTS; BRING IN YOUR GLASSES.    ONLY TAKE MEDICATION THE MORNING OF YOUR PROCEDURE IF INSTRUCTED BY YOUR SURGEON WITH ENOUGH WATER TO SWALLOW THE MEDICATION.  IF YOUR SURGEON DID NOT SPECIFY WHICH MEDICATIONS TO TAKE, YOU WILL NEED TO CALL THEIR OFFICE FOR FURTHER INSTRUCTIONS AND DO AS THEY INSTRUCT.    LEAVE ANYTHING YOU CONSIDER VALUABLE AT HOME.    YOU WILL NEED TO ARRANGE FOR SOMEONE TO DRIVE YOU HOME AFTER SURGERY.  IT IS RECOMMENDED THAT YOU DO NOT DRIVE, WORK, DRINK ALCOHOL OR MAKE MAJOR DECISIONS FOR AT LEAST 24 HOURS AFTER YOUR PROCEDURE IS COMPLETE.      THE DAY OF YOUR PROCEDURE, BRING IN THE FOLLOWING IF APPLICABLE:   PICTURE ID AND INSURANCE/MEDICARE OR MEDICAID CARDS/ANY CO-PAY THAT MAY BE DUE   COPY OF ADVANCED DIRECTIVE/LIVING WILL/POWER OR    CPAP/BIPAP/INHALERS   SKIN PREP SHEET   YOUR PREADMISSION TESTING PASS (IF NOT A PHONE HISTORY)           COVID self-quarantine instructions reviewed with the pt.  Verbalized understanding.

## 2021-08-05 LAB — SARS-COV-2 RNA PNL SPEC NAA+PROBE: NOT DETECTED

## 2021-08-06 ENCOUNTER — HOSPITAL ENCOUNTER (OUTPATIENT)
Facility: HOSPITAL | Age: 56
Setting detail: HOSPITAL OUTPATIENT SURGERY
Discharge: HOME | End: 2021-08-06
Attending: SURGERY | Admitting: SURGERY

## 2021-08-06 ENCOUNTER — ANESTHESIA (OUTPATIENT)
Dept: GASTROENTEROLOGY | Facility: HOSPITAL | Age: 56
End: 2021-08-06

## 2021-08-06 ENCOUNTER — ANESTHESIA EVENT (OUTPATIENT)
Dept: GASTROENTEROLOGY | Facility: HOSPITAL | Age: 56
End: 2021-08-06

## 2021-08-06 VITALS
HEART RATE: 78 BPM | DIASTOLIC BLOOD PRESSURE: 62 MMHG | TEMPERATURE: 98.3 F | RESPIRATION RATE: 16 BRPM | OXYGEN SATURATION: 98 % | HEIGHT: 62 IN | BODY MASS INDEX: 22.82 KG/M2 | SYSTOLIC BLOOD PRESSURE: 104 MMHG | WEIGHT: 124 LBS

## 2021-08-06 DIAGNOSIS — R10.11 RUQ ABDOMINAL PAIN: ICD-10-CM

## 2021-08-06 DIAGNOSIS — R11.0 NAUSEA: ICD-10-CM

## 2021-08-06 DIAGNOSIS — R10.13 EPIGASTRIC ABDOMINAL PAIN: ICD-10-CM

## 2021-08-06 PROCEDURE — 25010000002 ONDANSETRON PER 1 MG: Performed by: NURSE ANESTHETIST, CERTIFIED REGISTERED

## 2021-08-06 PROCEDURE — 43239 EGD BIOPSY SINGLE/MULTIPLE: CPT | Performed by: SURGERY

## 2021-08-06 PROCEDURE — 25010000002 PROPOFOL 200 MG/20ML EMULSION: Performed by: NURSE ANESTHETIST, CERTIFIED REGISTERED

## 2021-08-06 RX ORDER — PROPOFOL 10 MG/ML
INJECTION, EMULSION INTRAVENOUS AS NEEDED
Status: DISCONTINUED | OUTPATIENT
Start: 2021-08-06 | End: 2021-08-06 | Stop reason: SURG

## 2021-08-06 RX ORDER — SODIUM CHLORIDE, SODIUM LACTATE, POTASSIUM CHLORIDE, CALCIUM CHLORIDE 600; 310; 30; 20 MG/100ML; MG/100ML; MG/100ML; MG/100ML
50 INJECTION, SOLUTION INTRAVENOUS CONTINUOUS
Status: DISCONTINUED | OUTPATIENT
Start: 2021-08-06 | End: 2021-08-06 | Stop reason: HOSPADM

## 2021-08-06 RX ORDER — LIDOCAINE HYDROCHLORIDE 20 MG/ML
INJECTION, SOLUTION INTRAVENOUS AS NEEDED
Status: DISCONTINUED | OUTPATIENT
Start: 2021-08-06 | End: 2021-08-06 | Stop reason: SURG

## 2021-08-06 RX ORDER — ONDANSETRON 2 MG/ML
4 INJECTION INTRAMUSCULAR; INTRAVENOUS ONCE AS NEEDED
Status: DISCONTINUED | OUTPATIENT
Start: 2021-08-06 | End: 2021-08-06 | Stop reason: HOSPADM

## 2021-08-06 RX ORDER — ONDANSETRON 2 MG/ML
INJECTION INTRAMUSCULAR; INTRAVENOUS AS NEEDED
Status: DISCONTINUED | OUTPATIENT
Start: 2021-08-06 | End: 2021-08-06 | Stop reason: SURG

## 2021-08-06 RX ADMIN — SODIUM CHLORIDE, POTASSIUM CHLORIDE, SODIUM LACTATE AND CALCIUM CHLORIDE 50 ML/HR: 600; 310; 30; 20 INJECTION, SOLUTION INTRAVENOUS at 10:34

## 2021-08-06 RX ADMIN — PROPOFOL 100 MG: 10 INJECTION, EMULSION INTRAVENOUS at 12:50

## 2021-08-06 RX ADMIN — LIDOCAINE HYDROCHLORIDE 60 MG: 20 INJECTION, SOLUTION INTRAVENOUS at 12:50

## 2021-08-06 RX ADMIN — PROPOFOL 50 MG: 10 INJECTION, EMULSION INTRAVENOUS at 12:53

## 2021-08-06 RX ADMIN — ONDANSETRON 4 MG: 2 INJECTION INTRAMUSCULAR; INTRAVENOUS at 13:03

## 2021-08-06 NOTE — ANESTHESIA PREPROCEDURE EVALUATION
Anesthesia Evaluation     Patient summary reviewed and Nursing notes reviewed   no history of anesthetic complications:  NPO Solid Status: > 8 hours  NPO Liquid Status: > 8 hours           Airway   Mallampati: I  TM distance: >3 FB  Neck ROM: full  no difficulty expected  Dental - normal exam     Pulmonary - normal exam   (+) asthma,  Cardiovascular - normal exam    (+) hyperlipidemia,       Neuro/Psych  (+) numbness, psychiatric history,     GI/Hepatic/Renal/Endo    (+)  GERD,  thyroid problem hypothyroidism    Musculoskeletal (-) negative ROS        ROS comment: Spina bifida  Abdominal    Substance History - negative use     OB/GYN negative ob/gyn ROS         Other - negative ROS                       Anesthesia Plan    ASA 3     MAC     intravenous induction     Anesthetic plan, all risks, benefits, and alternatives have been provided, discussed and informed consent has been obtained with: patient.

## 2021-08-06 NOTE — ANESTHESIA POSTPROCEDURE EVALUATION
Patient: Maddi Betancur    Procedure Summary     Date: 08/06/21 Room / Location: [facility] ENDOSCOPY 2 / [facility] ENDOSCOPY    Anesthesia Start: 1244 Anesthesia Stop: 1303    Procedure: ESOPHAGOGASTRODUODENOSCOPY WITH BIOPSY and polypectomy (N/A Esophagus) Diagnosis:       Epigastric abdominal pain      Nausea      RUQ abdominal pain      (Epigastric abdominal pain [R10.13])      (Nausea [R11.0])      (RUQ abdominal pain [R10.11])    Surgeons: Tasha Vallejo MD Provider: Carey Gomez CRNA    Anesthesia Type: MAC ASA Status: 3          Anesthesia Type: MAC    Vitals  Vitals Value Taken Time   /62 08/06/21 1334   Temp 98.3 °F (36.8 °C) 08/06/21 1304   Pulse 78 08/06/21 1334   Resp 16 08/06/21 1334   SpO2 98 % 08/06/21 1334           Post Anesthesia Care and Evaluation    Patient location during evaluation: PHASE II  Patient participation: complete - patient participated  Level of consciousness: awake and alert  Pain score: 0  Pain management: satisfactory to patient  Airway patency: patent  Anesthetic complications: No anesthetic complications  PONV Status: none  Cardiovascular status: acceptable and stable  Respiratory status: acceptable  Hydration status: acceptable

## 2021-08-06 NOTE — BRIEF OP NOTE
Brief Op Note    Maddi Betancur  8/6/2021    Pre-op Diagnosis:   Epigastric abdominal pain [R10.13]  Nausea [R11.0]  RUQ abdominal pain [R10.11]       Post-Op Diagnosis :     * Epigastric abdominal pain [R10.13]     * Nausea [R11.0]     * RUQ abdominal pain [R10.11]    Procedure/CPT® Codes: 70590        Procedure(s):  ESOPHAGOGASTRODUODENOSCOPY WITH BIOPSY AND COLD FORCEP POLYPECTOMY    Surgeon(s):  Tasha Vallejo MD    Anesthesia: Monitored Anesthesia Care    Staff:   Circulator: Evelyn Solano RN  Scrub Person: Georgiana Collazo; Ernie Oliveira         Estimated Blood Loss: none    Urine Voided: * No values recorded between 8/6/2021 12:42 PM and 8/6/2021 12:58 PM *    Specimens:                Specimens     ID Source Type Tests Collected By Collected At Frozen?    A Small Intestine Tissue · TISSUE PATHOLOGY EXAM   Evelyn Solano RN 8/6/21 12:53 PM     Description: duodenum     This specimen was not marked as sent.    B Gastric, Antrum Tissue · TISSUE PATHOLOGY EXAM   Evelyn Solano RN 8/6/21 12:55 PM     Description: antrum    This specimen was not marked as sent.    C Gastric, Body Polyp · TISSUE PATHOLOGY EXAM   Evelyn Solano RN 8/6/21 12:57 PM     Description: polyp x1    This specimen was not marked as sent.    D Esophagus, Distal Tissue · TISSUE PATHOLOGY EXAM   Evelyn Solano RN 8/6/21 12:57 PM     This specimen was not marked as sent.                Drains: * No LDAs found *    Findings: few small gastric polyps, z line irregular at 38 cm    Complications: none          Tasha Vallejo MD     Date: 8/6/2021  Time: 13:02 EDT

## 2021-08-10 ENCOUNTER — TELEPHONE (OUTPATIENT)
Dept: SURGERY | Facility: CLINIC | Age: 56
End: 2021-08-10

## 2021-08-10 RX ORDER — RALOXIFENE HYDROCHLORIDE 60 MG/1
60 TABLET, FILM COATED ORAL DAILY
Qty: 90 TABLET | Refills: 4 | Status: SHIPPED | OUTPATIENT
Start: 2021-08-10 | End: 2022-08-01

## 2021-08-10 NOTE — TELEPHONE ENCOUNTER
Called patient concerning follow up  EGD appointment with Dr Vallejo. Called patient had to leave message for patient to call office and reschedule

## 2021-08-10 NOTE — TELEPHONE ENCOUNTER
Patient called saying she canceled her follow up EGD appointment with Dr Vallejo that she was told she would be called with results.

## 2021-08-11 LAB
LAB AP CASE REPORT: NORMAL
PATH REPORT.FINAL DX SPEC: NORMAL

## 2021-08-17 ENCOUNTER — TELEPHONE (OUTPATIENT)
Dept: ONCOLOGY | Facility: OTHER | Age: 56
End: 2021-08-17

## 2021-08-17 NOTE — TELEPHONE ENCOUNTER
PATIENT CALLED AS SHE HAD RECEIVED A MESSAGE THAT HER APPT. TIME NEEDED TO BE MOVED. TRIED TO W/T PER MESSAGE.  PER OFFICE THEY WILL CALL PATIENT BACK.  REQUESTED THAT THEY CONTACT PATIENT ON HER CELL # PER PATIENT REQUEST.

## 2021-08-31 RX ORDER — PANTOPRAZOLE SODIUM 40 MG/1
40 TABLET, DELAYED RELEASE ORAL DAILY
Qty: 90 TABLET | Refills: 3 | Status: SHIPPED | OUTPATIENT
Start: 2021-08-31 | End: 2021-12-13

## 2021-09-01 ENCOUNTER — OFFICE VISIT (OUTPATIENT)
Dept: GYNECOLOGIC ONCOLOGY | Facility: CLINIC | Age: 56
End: 2021-09-01

## 2021-09-01 VITALS
OXYGEN SATURATION: 98 % | HEIGHT: 62 IN | WEIGHT: 124.2 LBS | SYSTOLIC BLOOD PRESSURE: 109 MMHG | BODY MASS INDEX: 22.86 KG/M2 | HEART RATE: 69 BPM | DIASTOLIC BLOOD PRESSURE: 59 MMHG | RESPIRATION RATE: 15 BRPM

## 2021-09-01 DIAGNOSIS — Z91.89 AT HIGH RISK FOR BREAST CANCER: Primary | ICD-10-CM

## 2021-09-01 DIAGNOSIS — N60.92 ATYPICAL LOBULAR HYPERPLASIA (ALH) OF LEFT BREAST: Chronic | ICD-10-CM

## 2021-09-01 PROCEDURE — 99213 OFFICE O/P EST LOW 20 MIN: CPT | Performed by: NURSE PRACTITIONER

## 2021-09-01 NOTE — PROGRESS NOTES
GYN ONCOLOGY HIGH RISK CLINIC    Maddi Betancur  [id number]  1965    Subjective   Chief Complaint: Follow-up (high risk breast ALH, no new complaints)      HISTORY OF PRESENT ILLNESS:       Maddi Betancur is a 55 y.o. year old female here today for her high risk visit. Patient underwent bilateral breast reduction in 11/2019 after a 70 lb weight loss through diet and exercise. Pathology from her reduction revealed atypical lobular hyperplasia (ALH). She has had negative genetic testing and found to have a lifetime risk of breast cancer up to 30.7% by CARLI/Stephanie. Annual exam is currently UTD, last completed in 3/2021. Patient states she would like to have next annual exam here in 2022 in addition to her high risk breast management.     Patient has persistent discomfort at bilateral lower breast scars. She is using topical Vitamin E cream with limited benefit. She notes the bothersome scars are both keyloided. At last visit she was referred to breast PT, but reports difficulty scheduling with their office and has chosen just to monitor symptoms and conservatively manage at home.   Patient performs regular home self breast exams, denies any new or concerning findings. Last mammogram was completed on 5/6/2021, results BiRADS I. Initial high risk MRI completed in 8/2020, BiRADS II. She started Evista in 8/2020 and denies any side effects or trouble with the medication, desires to continue. Since that time her PCP has also added Forteo for treatment of osteoporosis and history of spontaneous fractures.     The current medication list and allergy list were reviewed and reconciled.     Past Medical History, Past Surgical History, Social History, Family History have been reviewed and are without significant changes except as mentioned.      Health Maintenance:    Regular self breast exam: yes  Mammogram: 5/6/2021. History of abnormal mammogram: no (ALH incidentally found upon path from reduction  "mammaplasty)  Breast MRI: 8/11/2020. Results: negative    Risk Asessment: Anuradha/Tyrer-Cuzick: 30.7%, h/o ALH      Review of Systems   Constitutional: Negative.    Respiratory: Negative.    Cardiovascular: Negative.    Skin:        Pain at bilateral lower breast scars since breast reduction, keloid scars       Objective   Physical Exam  Vital Signs: /59   Pulse 69   Resp 15   Ht 157.5 cm (62.01\")   Wt 56.3 kg (124 lb 3.2 oz)   SpO2 98%   BMI 22.71 kg/m²    Wt Readings from Last 3 Encounters:   09/01/21 1123 56.3 kg (124 lb 3.2 oz)   08/04/21 1543 56.2 kg (124 lb)   08/04/21 1316 56.2 kg (124 lb)       Vitals:    09/01/21 1123   PainSc: 0-No pain           General Appearance:  alert, cooperative, no apparent distress, appears stated age and normal weight   Neurologic/Psychiatric: A&O x 3, gait steady, appropriate affect   Breasts:  Symmetrical, no masses, no lesions, no nipple discharge and scarring consistent with bilateral breast reduction. Bilateral lower horizontal scars keloid. No tenderness noted today.    Abdomen:   Soft, non-tender, non-distended and no organomegaly   Lymph nodes: No axillary adenopathy noted   Skin: No rashes, ulcers, or suspicious lesions noted              Assessment and Plan:    Diagnoses and all orders for this visit:    1. At high risk for breast cancer (Primary)  -     MRI Breast Bilateral Screening With & Without Contrast; Future    2. Atypical lobular hyperplasia (ALH) of left breast  -     MRI Breast Bilateral Screening With & Without Contrast; Future        Patient Education:  Reviewed screening schedule related to diagnosis as follows:  Monthly breast self-exam starting at age 18.  Clinical breast exam every 6 months.  Annual mammogram starting at age 40, or 10 years prior to the earliest diagnosis in the family.   Annual MRI starting at age 40, or 10 years prior to the earliest diagnosis in the family.   Consider chemoprevention for breast cancer risk reduction " (tamoxifen/evista).     Discussed the purpose of high risk clinic in coordinating, scheduling, and planning screening interventions to include yearly mammograms, yearly screening breast MRI, Q6 month CBE, and monthly SBE. Colonoscopy and GYN screening per national guidelines.   Patient desires to move her annual exams to our office, due next in 3/2022. She does have a history of abnormal pap smears, s/p cryo and hysterectomy, will need periodic pap smears of the vaginal cuff. Colonoscopy UTD.   Reviewed technique for SBE including concerning findings to report.  Continue every 6 month clinical breast exams.   Discussed yearly screening mammogram. Results will be reported by breast imaging center and a copy of report will be sent to our office for review. Last mammogram normal in 5/2021, repeat in 5/2022.  Discussed screening breast MRI scheduling based on menstrual cycles, HRT. We also discussed the increased sensitivity of MRI screening and possibility of call- backs for additional imaging or biopsies to establish baseline. Repeat MRI due in 8/2021, order placed now.  Discussed vitamin D as benefit to bone health and possible benefit to breast health. Recommended 1000 IU daily unless contraindicated  Discussed benefit of Vitamin E for fibrocystic breasts/breast pain  Discussed tamoxifen/evista use for chemoprevention including risk and benefits of use as well as side effects of medication. Patient initiated prescription Evista in 8/2020 and is happy with medication thus far, no side effects of note or complications, continue.     Previous referral to PT breast care was completed, but patient had some difficulties scheduling with their office. She has chosen to manage conservatively at home for now. Encouraged to call if she desires new PT referral.     Pain assessment was performed today as a part of patient’s care. For patients with pain related to surgery, gynecologic malignancy or cancer treatment, the plan is  as noted in the assessment/plan.  For patients with pain not related to these issues, they are to seek any further needed care from a more appropriate provider, such as PCP.      This was a 25 minute visit including face-to-face and non-face-to-face time on the date of service.          Return to clinic in 6 months for Annual Exam and High Risk visit.      Electronically signed by ALLAN Pelayo on 09/01/21 at 14:58 EDT

## 2021-09-22 ENCOUNTER — PATIENT MESSAGE (OUTPATIENT)
Dept: GYNECOLOGIC ONCOLOGY | Facility: CLINIC | Age: 56
End: 2021-09-22

## 2021-10-05 ENCOUNTER — OFFICE VISIT (OUTPATIENT)
Dept: INTERNAL MEDICINE | Facility: CLINIC | Age: 56
End: 2021-10-05

## 2021-10-05 VITALS
WEIGHT: 127 LBS | DIASTOLIC BLOOD PRESSURE: 78 MMHG | TEMPERATURE: 97.5 F | BODY MASS INDEX: 23.37 KG/M2 | OXYGEN SATURATION: 98 % | SYSTOLIC BLOOD PRESSURE: 126 MMHG | HEIGHT: 62 IN | HEART RATE: 65 BPM

## 2021-10-05 DIAGNOSIS — N39.0 URINARY TRACT INFECTION WITHOUT HEMATURIA, SITE UNSPECIFIED: Primary | ICD-10-CM

## 2021-10-05 LAB
BILIRUB BLD-MCNC: NEGATIVE MG/DL
CLARITY, POC: CLEAR
COLOR UR: YELLOW
GLUCOSE UR STRIP-MCNC: NEGATIVE MG/DL
KETONES UR QL: NEGATIVE
LEUKOCYTE EST, POC: ABNORMAL
NITRITE UR-MCNC: NEGATIVE MG/ML
PH UR: 7.5 [PH] (ref 5–8)
PROT UR STRIP-MCNC: NEGATIVE MG/DL
RBC # UR STRIP: ABNORMAL /UL
SP GR UR: 1.01 (ref 1–1.03)
UROBILINOGEN UR QL: NORMAL

## 2021-10-05 PROCEDURE — 96372 THER/PROPH/DIAG INJ SC/IM: CPT | Performed by: PHYSICIAN ASSISTANT

## 2021-10-05 PROCEDURE — 81003 URINALYSIS AUTO W/O SCOPE: CPT | Performed by: PHYSICIAN ASSISTANT

## 2021-10-05 PROCEDURE — 99213 OFFICE O/P EST LOW 20 MIN: CPT | Performed by: PHYSICIAN ASSISTANT

## 2021-10-05 RX ORDER — CEFTRIAXONE 500 MG/1
500 INJECTION, POWDER, FOR SOLUTION INTRAMUSCULAR; INTRAVENOUS ONCE
Status: COMPLETED | OUTPATIENT
Start: 2021-10-05 | End: 2021-10-05

## 2021-10-05 RX ADMIN — CEFTRIAXONE 500 MG: 500 INJECTION, POWDER, FOR SOLUTION INTRAMUSCULAR; INTRAVENOUS at 09:35

## 2021-10-05 NOTE — PROGRESS NOTES
Follow Up Office Visit      Patient Name: Maddi Betancur  : 1965   MRN: [id number]     Chief Complaint:    Chief Complaint   Patient presents with   • urinary issue       History of Present Illness: Maddi Betancur is a 56 y.o. female who is here today with concern of 3 days of worsening urinary frequency, urgency and feeling of incomplete bladder emptying. Today she developed some right flank discomfort. No fever, hematuria or vomiting. She does have evidence of small non-obstructing left renal stone from CT abdomen 2021 CT Abdomen Pelvis Stone Protocol (2021 10:26).  She states she is never previously passed a kidney stone.      Subjective      I have reviewed and the following portions of the patient's history were updated as appropriate: past family history, past medical history, past social history, past surgical history and problem list.      Current Outpatient Medications:   •  albuterol sulfate  (90 Base) MCG/ACT inhaler, Inhale 2 puffs Every 4 (Four) Hours As Needed for Wheezing or Shortness of Air., Disp: 8 g, Rfl: 5  •  azelastine (ASTELIN) 0.1 % nasal spray, 2 sprays into the nostril(s) as directed by provider 2 (Two) Times a Day As Needed for Rhinitis or Allergies., Disp: 30 mL, Rfl: 12  •  azelastine (OPTIVAR) 0.05 % ophthalmic solution, Apply 1 drop to eye(s) as directed by provider Every 12 (Twelve) Hours. As needed, Disp: , Rfl:   •  B Complex Vitamins (VITAMIN B COMPLEX PO), Take 1 tablet by mouth Daily., Disp: , Rfl:   •  betamethasone valerate (VALISONE) 0.1 % cream, Apply 1 application topically to the appropriate area as directed Daily. As needed for dry patchy areas, Disp: , Rfl:   •  buPROPion XL (WELLBUTRIN XL) 300 MG 24 hr tablet, TAKE 1 TABLET EVERY MORNING (Patient taking differently: Take 300 mg by mouth Every Morning.), Disp: 90 tablet, Rfl: 3  •  Calcium Carbonate-Vitamin D (TGT CALCIUM DIETARY SUPPLEMENT PO), Take 600 mg by mouth Daily., Disp: , Rfl:  "  •  [employer] FIBER GUMMIES PO, Take 2 tablets by mouth Daily. Off tablets this week due to bowel prep, Disp: , Rfl:   •  EPIPEN 2-DONNA 0.3 MG/0.3ML solution auto-injector injection, Inject 0.3 mg into the appropriate muscle as directed by prescriber 1 (One) Time. As needed, Disp: , Rfl: 0  •  fluticasone (FLONASE ALLERGY RELIEF) 50 MCG/ACT nasal spray, 2 sprays into the nostril(s) as directed by provider Daily. Uses prn, Disp: , Rfl:   •  Multiple Vitamins-Minerals (AIRBORNE PO), Take 1 tablet by mouth Daily., Disp: , Rfl:   •  pantoprazole (PROTONIX) 40 MG EC tablet, TAKE 1 TABLET BY MOUTH DAILY, Disp: 90 tablet, Rfl: 3  •  raloxifene (Evista) 60 MG tablet, Take 1 tablet by mouth Daily., Disp: 90 tablet, Rfl: 4  •  rosuvastatin (CRESTOR) 10 MG tablet, TAKE 1 TABLET NIGHTLY      *GLENMARK MFR* (Patient taking differently: Take 10 mg by mouth Daily.), Disp: 90 tablet, Rfl: 3  •  Synthroid 50 MCG tablet, TAKE 1 TABLET DAILY ON AN  EMPTY STOMACH AND WAIT 30  MINUTES BEFORE EATING OR   TAKING OTHER MEDICATIONS (Patient taking differently: Take 50 mcg by mouth Daily.), Disp: 90 tablet, Rfl: 0  •  Teriparatide, Recombinant, (FORTEO) 620 MCG/2.48ML injection, INJECT 20 MCG UNDER THE SKIN 1 TIME A DAY. DISCARD PEN 28 DAYS AFTER INITIAL USE. (Patient taking differently: Inject 20 mcg under the skin into the appropriate area as directed Daily. INJECT 20 MCG UNDER THE SKIN 1 TIME A DAY. DISCARD PEN 28 DAYS AFTER INITIAL USE.), Disp: 1 pen, Rfl: 11  No current facility-administered medications for this visit.    Allergies   Allergen Reactions   • Prolia [Denosumab] Other (See Comments)     Severe joint pain   • Strawberry Rash       Objective     Physical Exam:  Vital Signs:   Vitals:    10/05/21 0856   BP: 126/78   Pulse: 65   Temp: 97.5 °F (36.4 °C)   SpO2: 98%   Weight: 57.6 kg (127 lb)   Height: 157.5 cm (62.01\")     Body mass index is 23.22 kg/m².    Physical Exam  Vitals and nursing note reviewed.   Constitutional:       " General: She is not in acute distress.     Appearance: Normal appearance. She is well-developed and normal weight. She is not ill-appearing, toxic-appearing or diaphoretic.   HENT:      Head: Normocephalic and atraumatic.      Right Ear: External ear normal.      Left Ear: External ear normal.   Eyes:      General: No scleral icterus.     Extraocular Movements: Extraocular movements intact.      Conjunctiva/sclera: Conjunctivae normal.      Pupils: Pupils are equal, round, and reactive to light.   Cardiovascular:      Rate and Rhythm: Normal rate and regular rhythm.      Heart sounds: Normal heart sounds. No murmur heard.   No friction rub. No gallop.    Pulmonary:      Effort: Pulmonary effort is normal. No respiratory distress.      Breath sounds: Normal breath sounds. No wheezing, rhonchi or rales.   Chest:      Chest wall: No tenderness.   Abdominal:      General: Abdomen is flat. Bowel sounds are normal. There is no distension.      Palpations: Abdomen is soft.      Tenderness: There is abdominal tenderness in the right lower quadrant, suprapubic area and left lower quadrant. There is no right CVA tenderness, left CVA tenderness, guarding or rebound. Negative signs include Thompson's sign, Rovsing's sign, McBurney's sign, psoas sign and obturator sign.   Musculoskeletal:         General: No tenderness or deformity. Normal range of motion.      Cervical back: Normal range of motion and neck supple.      Right lower leg: No edema.      Left lower leg: No edema.   Skin:     General: Skin is warm and dry.      Capillary Refill: Capillary refill takes less than 2 seconds.      Coloration: Skin is not jaundiced or pale.      Findings: No erythema or rash.   Neurological:      Mental Status: She is alert and oriented to person, place, and time.      Cranial Nerves: No cranial nerve deficit.      Sensory: No sensory deficit.      Motor: No abnormal muscle tone.      Coordination: Coordination normal.      Deep Tendon  Reflexes: Reflexes normal.   Psychiatric:         Mood and Affect: Mood normal.         Behavior: Behavior normal.         Thought Content: Thought content normal.         Judgment: Judgment normal.           Common labs    Common Labsle 2/2/21 2/2/21 2/2/21    0808 0808 0808   Glucose  92    BUN  7    Creatinine  0.72    eGFR Non  Am  84    eGFR African Am  102    Sodium  140    Potassium  4.2    Chloride  106    Calcium  9.2    Total Protein  6.3    Albumin  4.20    Total Bilirubin  0.3    Alkaline Phosphatase  59    AST (SGOT)  20    ALT (SGPT)  13    WBC 4.42     Hemoglobin 11.1 (A)     Hematocrit 34.6     Platelets 191     Total Cholesterol   133   Triglycerides   51   HDL Cholesterol   68 (A)   LDL Cholesterol    53   (A) Abnormal value       Comments are available for some flowsheets but are not being displayed.               Assessment / Plan      Assessment/Plan:   Diagnoses and all orders for this visit:    1. Urinary tract infection without hematuria, site unspecified (Primary)  -     POC Urinalysis Dipstick, Automated  -     Urine Culture - Urine, Urine, Clean Catch  -     cefTRIAXone (ROCEPHIN) injection 500 mg       Recommend Azo for symptom control while awaiting urine culture result.     Brief Urine Lab Results  (Last result in the past 365 days)      Color   Clarity   Blood   Leuk Est   Nitrite   Protein   CREAT   Urine HCG        10/05/21 0911 Yellow Clear Trace Trace Negative Negative             Increase water intake.      Follow Up:   Return if symptoms worsen or fail to improve.    Patient was given instructions and counseling regarding her condition or for health maintenance advice. Please see specific information pulled into the AVS if appropriate.     Leonor Ambriz PA-C  [facility] [address] [facility]     Please note that portions of this note may have been completed with a voice recognition program. Efforts were made to edit the dictations, but occasionally words are  mistranscribed.

## 2021-10-07 LAB
BACTERIA UR CULT: NORMAL
BACTERIA UR CULT: NORMAL

## 2021-10-26 DIAGNOSIS — E03.9 ACQUIRED HYPOTHYROIDISM: Chronic | ICD-10-CM

## 2021-10-26 RX ORDER — LEVOTHYROXINE SODIUM 50 MCG
TABLET ORAL
Qty: 90 TABLET | Refills: 0 | Status: SHIPPED | OUTPATIENT
Start: 2021-10-26 | End: 2022-01-24

## 2021-10-27 ENCOUNTER — TELEPHONE (OUTPATIENT)
Dept: INTERNAL MEDICINE | Facility: CLINIC | Age: 56
End: 2021-10-27

## 2021-10-27 NOTE — TELEPHONE ENCOUNTER
Patient called and states she got her 3rd booster for covid on Monday at a pharmacy. She states she has had a fever and chills and woke up today and can not shake the shakey feeling from chilling. She would like a call back to discuss.

## 2021-10-27 NOTE — TELEPHONE ENCOUNTER
Called pt, I informed pt that it is common to see some symptoms like slight fever or chills after the vaccine and pt states she was worse this morning but a fever did come back, I advised pt to get checked out at urgent care to possibly get tests done if needed and to keep us updated if symptoms worsen. Pt agreed

## 2021-11-12 ENCOUNTER — HOSPITAL ENCOUNTER (OUTPATIENT)
Dept: MRI IMAGING | Facility: HOSPITAL | Age: 56
Discharge: HOME | End: 2021-11-12
Admitting: NURSE PRACTITIONER

## 2021-11-12 DIAGNOSIS — Z91.89 AT HIGH RISK FOR BREAST CANCER: ICD-10-CM

## 2021-11-12 DIAGNOSIS — N60.92 ATYPICAL LOBULAR HYPERPLASIA (ALH) OF LEFT BREAST: Chronic | ICD-10-CM

## 2021-11-12 PROCEDURE — A9577 INJ MULTIHANCE: HCPCS | Performed by: NURSE PRACTITIONER

## 2021-11-12 PROCEDURE — 0 GADOBENATE DIMEGLUMINE 529 MG/ML SOLUTION: Performed by: NURSE PRACTITIONER

## 2021-11-12 PROCEDURE — 77049 MRI BREAST C-+ W/CAD BI: CPT

## 2021-11-12 PROCEDURE — 77049 MRI BREAST C-+ W/CAD BI: CPT | Performed by: RADIOLOGY

## 2021-11-12 PROCEDURE — C8937 CAD BREAST MRI: HCPCS

## 2021-11-12 RX ADMIN — GADOBENATE DIMEGLUMINE 9 ML: 529 INJECTION, SOLUTION INTRAVENOUS at 09:03

## 2021-11-18 ENCOUNTER — TELEPHONE (OUTPATIENT)
Dept: MRI IMAGING | Facility: HOSPITAL | Age: 56
End: 2021-11-18

## 2021-12-13 ENCOUNTER — OFFICE VISIT (OUTPATIENT)
Dept: INTERNAL MEDICINE | Facility: CLINIC | Age: 56
End: 2021-12-13

## 2021-12-13 VITALS
WEIGHT: 126.4 LBS | DIASTOLIC BLOOD PRESSURE: 80 MMHG | TEMPERATURE: 98.4 F | OXYGEN SATURATION: 100 % | BODY MASS INDEX: 23.26 KG/M2 | RESPIRATION RATE: 16 BRPM | HEART RATE: 64 BPM | SYSTOLIC BLOOD PRESSURE: 120 MMHG | HEIGHT: 62 IN

## 2021-12-13 DIAGNOSIS — M25.562 CHRONIC PAIN OF BOTH KNEES: ICD-10-CM

## 2021-12-13 DIAGNOSIS — Q76.0 SPINA BIFIDA OCCULTA: ICD-10-CM

## 2021-12-13 DIAGNOSIS — M25.559 HIP PAIN: Primary | ICD-10-CM

## 2021-12-13 DIAGNOSIS — J30.9 ALLERGIC RHINITIS, UNSPECIFIED SEASONALITY, UNSPECIFIED TRIGGER: ICD-10-CM

## 2021-12-13 DIAGNOSIS — E03.9 ACQUIRED HYPOTHYROIDISM: Chronic | ICD-10-CM

## 2021-12-13 DIAGNOSIS — G89.29 CHRONIC PAIN OF BOTH KNEES: ICD-10-CM

## 2021-12-13 DIAGNOSIS — M25.561 CHRONIC PAIN OF BOTH KNEES: ICD-10-CM

## 2021-12-13 DIAGNOSIS — M81.0 OSTEOPOROSIS OF LUMBAR SPINE: ICD-10-CM

## 2021-12-13 PROBLEM — Z12.11 COLON CANCER SCREENING: Status: RESOLVED | Noted: 2021-03-29 | Resolved: 2021-12-13

## 2021-12-13 PROCEDURE — 99214 OFFICE O/P EST MOD 30 MIN: CPT | Performed by: FAMILY MEDICINE

## 2021-12-13 RX ORDER — IPRATROPIUM BROMIDE 21 UG/1
SPRAY, METERED NASAL
COMMUNITY
End: 2022-09-12
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
## 2021-12-20 RX ORDER — ROSUVASTATIN CALCIUM 10 MG/1
TABLET, COATED ORAL
Qty: 90 TABLET | Refills: 3 | Status: SHIPPED | OUTPATIENT
Start: 2021-12-20 | End: 2022-05-19 | Stop reason: SDUPTHER

## 2022-01-24 DIAGNOSIS — E03.9 ACQUIRED HYPOTHYROIDISM: Chronic | ICD-10-CM

## 2022-01-24 RX ORDER — LEVOTHYROXINE SODIUM 50 MCG
TABLET ORAL
Qty: 90 TABLET | Refills: 0 | Status: SHIPPED | OUTPATIENT
Start: 2022-01-24 | End: 2022-04-25

## 2022-02-21 ENCOUNTER — TRANSCRIBE ORDERS (OUTPATIENT)
Dept: ADMINISTRATIVE | Facility: HOSPITAL | Age: 57
End: 2022-02-21

## 2022-02-21 DIAGNOSIS — M25.371 OTHER INSTABILITY, RIGHT ANKLE: Primary | ICD-10-CM

## 2022-02-25 ENCOUNTER — APPOINTMENT (OUTPATIENT)
Dept: MRI IMAGING | Facility: HOSPITAL | Age: 57
End: 2022-02-25

## 2022-02-25 ENCOUNTER — HOSPITAL ENCOUNTER (OUTPATIENT)
Dept: MRI IMAGING | Facility: HOSPITAL | Age: 57
Discharge: HOME | End: 2022-02-25
Admitting: ORTHOPAEDIC SURGERY

## 2022-02-25 DIAGNOSIS — M25.371 OTHER INSTABILITY, RIGHT ANKLE: ICD-10-CM

## 2022-02-25 PROCEDURE — 73721 MRI JNT OF LWR EXTRE W/O DYE: CPT

## 2022-03-11 ENCOUNTER — OFFICE VISIT (OUTPATIENT)
Dept: GYNECOLOGIC ONCOLOGY | Facility: CLINIC | Age: 57
End: 2022-03-11

## 2022-03-11 VITALS
SYSTOLIC BLOOD PRESSURE: 119 MMHG | HEART RATE: 62 BPM | BODY MASS INDEX: 23.42 KG/M2 | WEIGHT: 127.3 LBS | TEMPERATURE: 98.4 F | HEIGHT: 62 IN | DIASTOLIC BLOOD PRESSURE: 58 MMHG | RESPIRATION RATE: 15 BRPM

## 2022-03-11 DIAGNOSIS — N60.92 ATYPICAL LOBULAR HYPERPLASIA (ALH) OF LEFT BREAST: Chronic | ICD-10-CM

## 2022-03-11 DIAGNOSIS — Z91.89 AT HIGH RISK FOR BREAST CANCER: ICD-10-CM

## 2022-03-11 DIAGNOSIS — Z01.419 WELL WOMAN EXAM WITH ROUTINE GYNECOLOGICAL EXAM: Primary | ICD-10-CM

## 2022-03-11 DIAGNOSIS — Z85.41 HISTORY OF CERVICAL CANCER: ICD-10-CM

## 2022-03-11 PROCEDURE — 99396 PREV VISIT EST AGE 40-64: CPT | Performed by: NURSE PRACTITIONER

## 2022-03-11 NOTE — PROGRESS NOTES
GYN ONCOLOGY ANNUAL WELL WOMAN VISIT      Maddi Betancur  [id number]  1965      Subjective   Chief Complaint: Annual Exam (No complaints)        History of present illness:      Maddi Betancur is a 56 y.o. year old female who is here today for an annual exam. Patient underwent bilateral breast reduction in 2019 after a 70 lb weight loss through diet and exercise. Pathology from her reduction revealed atypical lobular hyperplasia (ALH). She has had negative genetic testing and found to have a lifetime risk of breast cancer up to 30.7% by CARLI/Stephanie. She has been followed by our cancer risk management clinic since that time and is now having her annual exams here as well.     Patient is scheduled for right ankle surgery next month, then planning to have the left ankle done in the future. She has a history of spontaneous fractures and takes Forteo as prescribed by her PCP for osteoporosis. She is happy with her surgeon and hoping for a quick recovery.   She performs regular home self breast exams, denies any new or concerning finding, keloid scars unchanged. She began Evista in 2020 for chemoprevention, continues to be happy with the medication. Breast imaging is currently UTD.   She reports she is feeling very well today and has no complaints. She denies vaginal bleeding, pelvic pain, changes in bowel or bladder function, new or concerning lesions, and breast problems. Well woman screenings currently UTD. She has a history of very early cervical cancer in her 20's (prior to having her children) that was treated conservatively to maintain fertility. She later underwent hysterectomy with ovarian preservation at age 45.       Obstetric History:  OB History        4    Para   3    Term   3            AB   1    Living   3       SAB   1    IAB        Ectopic        Molar        Multiple        Live Births                   Menstrual History:     No LMP recorded. Patient has had a  "hysterectomy.          The current medication list and allergy list were reviewed and reconciled.     Past Medical History, Past Surgical History, Social History, Family History have been reviewed and are without significant changes except as mentioned.    Health Maintenance:  Last colonoscopy was 4/2021, with recommended follow-up in 3 year(s). Last pap smear was 3/2021, results were  normal PAP.. DEXA followed closely by PCP, h/o osteoporosis.   Regular self breast exam: yes  Mammogram: 5/6/2021. History of abnormal mammogram: no (ALH incidentally found upon path from reduction mammaplasty)  Breast MRI: 11/12/2021. Results: negative     Risk Asessment: Anuradha/Tyrer-Cuzick: 30.7%, h/o ALH      Review of Systems   Constitutional: Negative.    Gastrointestinal: Negative.    Genitourinary: Negative.    Musculoskeletal: Positive for arthralgias.       Objective   Physical Exam  Vital Signs: /58   Pulse 62   Temp 98.4 °F (36.9 °C) (Temporal)   Resp 15   Ht 157.5 cm (62.01\")   Wt 57.7 kg (127 lb 4.8 oz)   BMI 23.28 kg/m²   Vitals:    03/11/22 0909   PainSc: 0-No pain           General Appearance:  alert, cooperative, no apparent distress, appears stated age and normal weight   Neurologic/Psychiatric: A&O x 3, gait steady, appropriate affect   Lungs:   Clear to auscultation bilaterally; respirations regular, even, and unlabored bilaterally   Heart:  Regular rate and rhythm, no murmurs appreciated   Breasts:  Symmetrical, no masses, no lesions, no nipple discharge and scarring consistent with bilateral breast reduction. Bilateral lower horizontal scars keloid. No tenderness noted today.    Abdomen:   Soft, non-tender, non-distended and no organomegaly   Lymph nodes: No cervical, supraclavicular, inguinal or axillary adenopathy noted   Extremities: Normal, atraumatic; no clubbing, cyanosis, or edema    Skin: No rashes, ulcers, or suspicious lesions noted   Pelvic: External Genitalia  without lesions or skin " changes  Vagina  is pink, moist, without lesions.   Vaginal Cuff  Female Vaginal Cuff: smooth, intact, without visible lesions and pap obtained  Uterus  surgically absent  Ovaries  without palpable masses or fullness  Parametria  smooth  Rectovaginal  Female rectovaginal: deferred     ECOG score: 0                Assessment and Plan:      Diagnoses and all orders for this visit:    1. Well woman exam with routine gynecological exam (Primary)    2. Atypical lobular hyperplasia (ALH) of left breast    3. At high risk for breast cancer    4. History of cervical cancer         Pap was done today. If she does not receive the results of the Pap within 2 weeks  time, she was instructed to call to find out the results.      Reviewed screening schedule related to high risk for breast cancer as follows:  Monthly breast self-exam starting at age 18.  Clinical breast exam every 6 months.  Annual mammogram starting at age 40, or 10 years prior to the earliest diagnosis in the family.   Annual MRI starting at age 40, or 10 years prior to the earliest diagnosis in the family.   Consider chemoprevention for breast cancer risk reduction (tamoxifen/evista).      Discussed the purpose of high risk clinic in coordinating, scheduling, and planning screening interventions to include yearly mammograms, yearly screening breast MRI, Q6 month CBE, and monthly SBE. Colonoscopy and GYN screening per national guidelines.   Patient desired to move her annual exams to our office, completed today, repeat in 1 year. She does have a history of early cervical cancer, s/p cryo and hysterectomy, will continue periodic pap smears of the vaginal cuff. Colonoscopy UTD, repeat in 2024 or sooner if new problems.   Reviewed technique for SBE including concerning findings to report.  Continue every 6 month clinical breast exams. CBE normal today.  Discussed yearly screening mammogram. Results will be reported by breast imaging center and a copy of report will  be sent to our office for review. Last mammogram normal in 5/2021, repeat in 5/2022.  Discussed screening breast MRI scheduling based on menstrual cycles, HRT. We also discussed the increased sensitivity of MRI screening and possibility of call- backs for additional imaging or biopsies to establish baseline. Repeat MRI due in 11/2022, will order at next visit  Discussed vitamin D as benefit to bone health and possible benefit to breast health. Recommended 1000 IU daily unless contraindicated  Discussed benefit of Vitamin E for fibrocystic breasts/breast pain  Discussed tamoxifen/evista use for chemoprevention including risk and benefits of use as well as side effects of medication. Patient initiated prescription Evista in 8/2020 and is happy with medication thus far, no side effects of note or complications, continue.    Pain assessment was performed today as a part of patient’s care. For patients with pain related to surgery, gynecologic malignancy or cancer treatment, the plan is as noted in the assessment/plan.  For patients with pain not related to these issues, they are to seek any further needed care from a more appropriate provider, such as PCP.      Follow-up:     Return to clinic in 6 months for High Risk visit.      Electronically signed by ALLAN Pelayo on 03/11/22 at 09:43 EST

## 2022-03-22 ENCOUNTER — APPOINTMENT (OUTPATIENT)
Dept: MRI IMAGING | Facility: HOSPITAL | Age: 57
End: 2022-03-22

## 2022-04-01 ENCOUNTER — TRANSCRIBE ORDERS (OUTPATIENT)
Dept: GYNECOLOGIC ONCOLOGY | Facility: CLINIC | Age: 57
End: 2022-04-01

## 2022-04-01 ENCOUNTER — TELEPHONE (OUTPATIENT)
Dept: GYNECOLOGIC ONCOLOGY | Facility: CLINIC | Age: 57
End: 2022-04-01

## 2022-04-01 NOTE — TELEPHONE ENCOUNTER
Caller: Maddi Betancur    Relationship: Self    Best call back number: [phone]    What is the best time to reach you: ANYTIME    Who are you requesting to speak with (clinical staff, provider,  specific staff member): CLINICAL      What was the call regarding: PATIENT IS ATTEMPTING TO SCHED MAMMOGRAM.    PATIENT STATES SHE WAS TOLD BY SCHEDULING THAT SHE WOULD NEED TO SCHEDULE AS A DIAGNOSTIC MAMMOGRAM DUE TO INTERMITTENT PAIN DESPITE HAVING BEEN SCHEDULED WITHOUT DX PETER THE LAST FEW TIMES    Do you require a callback: YES

## 2022-04-04 DIAGNOSIS — Z91.89 AT HIGH RISK FOR BREAST CANCER: ICD-10-CM

## 2022-04-04 DIAGNOSIS — N64.4 BREAST PAIN: Primary | ICD-10-CM

## 2022-04-04 DIAGNOSIS — N60.92 ATYPICAL LOBULAR HYPERPLASIA (ALH) OF LEFT BREAST: ICD-10-CM

## 2022-04-24 DIAGNOSIS — E03.9 ACQUIRED HYPOTHYROIDISM: Chronic | ICD-10-CM

## 2022-04-25 RX ORDER — LEVOTHYROXINE SODIUM 50 MCG
TABLET ORAL
Qty: 90 TABLET | Refills: 0 | Status: SHIPPED | OUTPATIENT
Start: 2022-04-25 | End: 2022-05-19 | Stop reason: SDUPTHER

## 2022-05-13 ENCOUNTER — HOSPITAL ENCOUNTER (OUTPATIENT)
Dept: MAMMOGRAPHY | Facility: HOSPITAL | Age: 57
Discharge: HOME | End: 2022-05-13
Admitting: NURSE PRACTITIONER

## 2022-05-13 DIAGNOSIS — Z91.89 AT HIGH RISK FOR BREAST CANCER: ICD-10-CM

## 2022-05-13 DIAGNOSIS — N64.4 BREAST PAIN: ICD-10-CM

## 2022-05-13 DIAGNOSIS — N60.92 ATYPICAL LOBULAR HYPERPLASIA (ALH) OF LEFT BREAST: ICD-10-CM

## 2022-05-13 PROCEDURE — 77063 BREAST TOMOSYNTHESIS BI: CPT

## 2022-05-13 PROCEDURE — 77067 SCR MAMMO BI INCL CAD: CPT

## 2022-05-13 PROCEDURE — 77067 SCR MAMMO BI INCL CAD: CPT | Performed by: RADIOLOGY

## 2022-05-13 PROCEDURE — 77063 BREAST TOMOSYNTHESIS BI: CPT | Performed by: RADIOLOGY

## 2022-05-17 NOTE — PATIENT INSTRUCTIONS
Health Maintenance, Female  Adopting a healthy lifestyle and getting preventive care can go a long way to promote health and wellness. Talk with your health care provider about what schedule of regular examinations is right for you. This is a good chance for you to check in with your provider about disease prevention and staying healthy.  In between checkups, there are plenty of things you can do on your own. Experts have done a lot of research about which lifestyle changes and preventive measures are most likely to keep you healthy. Ask your health care provider for more information.  Weight and diet  Eat a healthy diet  Be sure to include plenty of vegetables, fruits, low-fat dairy products, and lean protein.  Do not eat a lot of foods high in solid fats, added sugars, or salt.  Get regular exercise. This is one of the most important things you can do for your health.  Most adults should exercise for at least 150 minutes each week. The exercise should increase your heart rate and make you sweat (moderate-intensity exercise).  Most adults should also do strengthening exercises at least twice a week. This is in addition to the moderate-intensity exercise.     Maintain a healthy weight  Body mass index (BMI) is a measurement that can be used to identify possible weight problems. It estimates body fat based on height and weight. Your health care provider can help determine your BMI and help you achieve or maintain a healthy weight.  For females 20 years of age and older:  A BMI below 18.5 is considered underweight.  A BMI of 18.5 to 24.9 is normal.  A BMI of 25 to 29.9 is considered overweight.  A BMI of 30 and above is considered obese.     Watch levels of cholesterol and blood lipids  You should start having your blood tested for lipids and cholesterol at 20 years of age, then have this test every 5 years.  You may need to have your cholesterol levels checked more often if:  Your lipid or cholesterol levels are  high.  You are older than 50 years of age.  You are at high risk for heart disease.     Cancer screening  Lung Cancer  Lung cancer screening is recommended for adults 55-80 years old who are at high risk for lung cancer because of a history of smoking.  A yearly low-dose CT scan of the lungs is recommended for people who:  Currently smoke.  Have quit within the past 15 years.  Have at least a 30-pack-year history of smoking. A pack year is smoking an average of one pack of cigarettes a day for 1 year.  Yearly screening should continue until it has been 15 years since you quit.  Yearly screening should stop if you develop a health problem that would prevent you from having lung cancer treatment.     Breast Cancer  Practice breast self-awareness. This means understanding how your breasts normally appear and feel.  It also means doing regular breast self-exams. Let your health care provider know about any changes, no matter how small.  If you are in your 20s or 30s, you should have a clinical breast exam (CBE) by a health care provider every 1-3 years as part of a regular health exam.  If you are 40 or older, have a CBE every year. Also consider having a breast X-ray (mammogram) every year.  If you have a family history of breast cancer, talk to your health care provider about genetic screening.  If you are at high risk for breast cancer, talk to your health care provider about having an MRI and a mammogram every year.  Breast cancer gene (BRCA) assessment is recommended for women who have family members with BRCA-related cancers. BRCA-related cancers include:  Breast.  Ovarian.  Tubal.  Peritoneal cancers.  Results of the assessment will determine the need for genetic counseling and BRCA1 and BRCA2 testing.     Cervical Cancer  Your health care provider may recommend that you be screened regularly for cancer of the pelvic organs (ovaries, uterus, and vagina). This screening involves a pelvic examination, including  checking for microscopic changes to the surface of your cervix (Pap test). You may be encouraged to have this screening done every 3 years, beginning at age 21.  For women ages 30-65, health care providers may recommend pelvic exams and Pap testing every 3 years, or they may recommend the Pap and pelvic exam, combined with testing for human papilloma virus (HPV), every 5 years. Some types of HPV increase your risk of cervical cancer. Testing for HPV may also be done on women of any age with unclear Pap test results.  Other health care providers may not recommend any screening for nonpregnant women who are considered low risk for pelvic cancer and who do not have symptoms. Ask your health care provider if a screening pelvic exam is right for you.  If you have had past treatment for cervical cancer or a condition that could lead to cancer, you need Pap tests and screening for cancer for at least 20 years after your treatment. If Pap tests have been discontinued, your risk factors (such as having a new sexual partner) need to be reassessed to determine if screening should resume. Some women have medical problems that increase the chance of getting cervical cancer. In these cases, your health care provider may recommend more frequent screening and Pap tests.     Colorectal Cancer  This type of cancer can be detected and often prevented.  Routine colorectal cancer screening usually begins at 50 years of age and continues through 75 years of age.  Your health care provider may recommend screening at an earlier age if you have risk factors for colon cancer.  Your health care provider may also recommend using home test kits to check for hidden blood in the stool.  A small camera at the end of a tube can be used to examine your colon directly (sigmoidoscopy or colonoscopy). This is done to check for the earliest forms of colorectal cancer.  Routine screening usually begins at age 50.  Direct examination of the colon should  be repeated every 5-10 years through 75 years of age. However, you may need to be screened more often if early forms of precancerous polyps or small growths are found.     Skin Cancer  Check your skin from head to toe regularly.  Tell your health care provider about any new moles or changes in moles, especially if there is a change in a mole's shape or color.  Also tell your health care provider if you have a mole that is larger than the size of a pencil eraser.  Always use sunscreen. Apply sunscreen liberally and repeatedly throughout the day.  Protect yourself by wearing long sleeves, pants, a wide-brimmed hat, and sunglasses whenever you are outside.     Heart disease, diabetes, and high blood pressure  High blood pressure causes heart disease and increases the risk of stroke. High blood pressure is more likely to develop in:  People who have blood pressure in the high end of the normal range (130-139/85-89 mm Hg).  People who are overweight or obese.  People who are .  If you are 18-39 years of age, have your blood pressure checked every 3-5 years. If you are 40 years of age or older, have your blood pressure checked every year. You should have your blood pressure measured twice--once when you are at a hospital or clinic, and once when you are not at a hospital or clinic. Record the average of the two measurements. To check your blood pressure when you are not at a hospital or clinic, you can use:  An automated blood pressure machine at a pharmacy.  A home blood pressure monitor.  If you are between 55 years and 79 years old, ask your health care provider if you should take aspirin to prevent strokes.  Have regular diabetes screenings. This involves taking a blood sample to check your fasting blood sugar level.  If you are at a normal weight and have a low risk for diabetes, have this test once every three years after 45 years of age.  If you are overweight and have a high risk for diabetes,  consider being tested at a younger age or more often.  Preventing infection  Hepatitis B  If you have a higher risk for hepatitis B, you should be screened for this virus. You are considered at high risk for hepatitis B if:  You were born in a country where hepatitis B is common. Ask your health care provider which countries are considered high risk.  Your parents were born in a high-risk country, and you have not been immunized against hepatitis B (hepatitis B vaccine).  You have HIV or AIDS.  You use needles to inject street drugs.  You live with someone who has hepatitis B.  You have had sex with someone who has hepatitis B.  You get hemodialysis treatment.  You take certain medicines for conditions, including cancer, organ transplantation, and autoimmune conditions.     Hepatitis C  Blood testing is recommended for:  Everyone born from 1945 through 1965.  Anyone with known risk factors for hepatitis C.     Sexually transmitted infections (STIs)  You should be screened for sexually transmitted infections (STIs) including gonorrhea and chlamydia if:  You are sexually active and are younger than 24 years of age.  You are older than 24 years of age and your health care provider tells you that you are at risk for this type of infection.  Your sexual activity has changed since you were last screened and you are at an increased risk for chlamydia or gonorrhea. Ask your health care provider if you are at risk.  If you do not have HIV, but are at risk, it may be recommended that you take a prescription medicine daily to prevent HIV infection. This is called pre-exposure prophylaxis (PrEP). You are considered at risk if:  You are sexually active and do not regularly use condoms or know the HIV status of your partner(s).  You take drugs by injection.  You are sexually active with a partner who has HIV.     Talk with your health care provider about whether you are at high risk of being infected with HIV. If you choose to  begin PrEP, you should first be tested for HIV. You should then be tested every 3 months for as long as you are taking PrEP.  Pregnancy  If you are premenopausal and you may become pregnant, ask your health care provider about preconception counseling.  If you may become pregnant, take 400 to 800 micrograms (mcg) of folic acid every day.  If you want to prevent pregnancy, talk to your health care provider about birth control (contraception).  Osteoporosis and menopause  Osteoporosis is a disease in which the bones lose minerals and strength with aging. This can result in serious bone fractures. Your risk for osteoporosis can be identified using a bone density scan.  If you are 65 years of age or older, or if you are at risk for osteoporosis and fractures, ask your health care provider if you should be screened.  Ask your health care provider whether you should take a calcium or vitamin D supplement to lower your risk for osteoporosis.  Menopause may have certain physical symptoms and risks.  Hormone replacement therapy may reduce some of these symptoms and risks.  Talk to your health care provider about whether hormone replacement therapy is right for you.  Follow these instructions at home:  Schedule regular health, dental, and eye exams.  Stay current with your immunizations.  Do not use any tobacco products including cigarettes, chewing tobacco, or electronic cigarettes.  If you are pregnant, do not drink alcohol.  If you are breastfeeding, limit how much and how often you drink alcohol.  Limit alcohol intake to no more than 1 drink per day for nonpregnant women. One drink equals 12 ounces of beer, 5 ounces of wine, or 1½ ounces of hard liquor.  Do not use street drugs.  Do not share needles.  Ask your health care provider for help if you need support or information about quitting drugs.  Tell your health care provider if you often feel depressed.  Tell your health care provider if you have ever been abused or do  not feel safe at home.  This information is not intended to replace advice given to you by your health care provider. Make sure you discuss any questions you have with your health care provider.  Document Released: 07/02/2012 Document Revised: 05/25/2017 Document Reviewed: 09/20/2016  [employer] Patient Education © 2018 [employer]

## 2022-05-18 ENCOUNTER — OFFICE VISIT (OUTPATIENT)
Dept: INTERNAL MEDICINE | Facility: CLINIC | Age: 57
End: 2022-05-18

## 2022-05-18 VITALS
RESPIRATION RATE: 16 BRPM | HEART RATE: 77 BPM | TEMPERATURE: 98.2 F | OXYGEN SATURATION: 99 % | DIASTOLIC BLOOD PRESSURE: 72 MMHG | BODY MASS INDEX: 23.92 KG/M2 | SYSTOLIC BLOOD PRESSURE: 120 MMHG | HEIGHT: 62 IN | WEIGHT: 130 LBS

## 2022-05-18 DIAGNOSIS — Z00.00 ANNUAL PHYSICAL EXAM: Primary | ICD-10-CM

## 2022-05-18 DIAGNOSIS — D50.9 IRON DEFICIENCY ANEMIA, UNSPECIFIED IRON DEFICIENCY ANEMIA TYPE: ICD-10-CM

## 2022-05-18 DIAGNOSIS — E03.9 ACQUIRED HYPOTHYROIDISM: ICD-10-CM

## 2022-05-18 DIAGNOSIS — M81.8 OTHER OSTEOPOROSIS WITHOUT CURRENT PATHOLOGICAL FRACTURE: ICD-10-CM

## 2022-05-18 DIAGNOSIS — R79.9 ABNORMAL BLOOD CHEMISTRY: ICD-10-CM

## 2022-05-18 DIAGNOSIS — E78.2 MIXED HYPERLIPIDEMIA: ICD-10-CM

## 2022-05-18 DIAGNOSIS — E55.9 VITAMIN D DEFICIENCY: ICD-10-CM

## 2022-05-18 PROBLEM — R11.0 NAUSEA: Status: RESOLVED | Noted: 2021-08-04 | Resolved: 2022-05-18

## 2022-05-18 PROBLEM — R10.11 RUQ ABDOMINAL PAIN: Status: RESOLVED | Noted: 2021-08-04 | Resolved: 2022-05-18

## 2022-05-18 PROBLEM — R10.13 EPIGASTRIC ABDOMINAL PAIN: Status: RESOLVED | Noted: 2021-08-04 | Resolved: 2022-05-18

## 2022-05-18 PROCEDURE — 99396 PREV VISIT EST AGE 40-64: CPT | Performed by: FAMILY MEDICINE

## 2022-05-18 RX ORDER — ASPIRIN 325 MG
325 TABLET ORAL DAILY
COMMUNITY
Start: 2022-04-21 | End: 2022-08-29

## 2022-05-18 NOTE — PROGRESS NOTES
"05/18/2022  Chief Complaint   Patient presents with   • Annual Exam       Maddi Betancur is here for her annual preventive exam. History per MA reviewed.     Cast on right foot, ankle surgery.  Brace left ankle. Had hardware removed, was \"backing out\" prior to surgery.   She's been told she's \"loose and unstable\"  On Forteo and Evista.     Maddi Betancur has the following medical issues:  Patient Active Problem List    Diagnosis    • Gastroesophageal reflux disease [K21.9]    • Acquired hypothyroidism [E03.9]    • Osteoporosis of lumbar spine [M81.0]    • Vitamin D deficiency [E55.9]    • Atypical lobular hyperplasia (ALH) of left breast [N60.92]    • Sciatica associated with disorder of lumbosacral spine [M53.87]    • Spina bifida occulta [Q76.0]    • Mixed hyperlipidemia [E78.2]    • Atopic rhinitis [J30.9]    • Asthma [J45.909]    • Depression [F32.A]        Health Maintenance   Topic Date Due   • LIPID PANEL  02/02/2022   • ANNUAL PHYSICAL  02/07/2022   • DXA SCAN  01/01/2023 (Originally 2/4/2022)   • Pneumococcal Vaccine 0-64 (2 - PCV) 05/19/2022   • INFLUENZA VACCINE  08/01/2022   • MAMMOGRAM  05/13/2023   • COLORECTAL CANCER SCREENING  04/02/2024   • PAP SMEAR  03/11/2025   • TDAP/TD VACCINES (2 - Td or Tdap) 04/23/2028   • HEPATITIS C SCREENING  Completed   • COVID-19 Vaccine  Completed   • ZOSTER VACCINE  Completed       Immunization History   Administered Date(s) Administered   • COVID-19 (PFIZER) PURPLE CAP 02/02/2021, 03/02/2021, 10/25/2021   • Flu Vaccine Quad PF >36MO 10/02/2017, 09/25/2021   • Hepatitis A 06/18/2019   • Influenza, Unspecified 11/01/2018, 09/30/2019, 09/10/2020   • PPD Test 09/18/2017   • Pneumococcal Polysaccharide (PPSV23) 05/19/2021   • Shingrix 03/19/2021, 03/19/2021, 05/21/2021, 05/21/2021   • Tdap 04/23/2018   • flucelvax quad pfs =>4 YRS 11/01/2018, 09/30/2019, 09/10/2020       Review of Systems   Constitutional: Negative for fever.   All other systems reviewed and are " "negative.      The following portions of the patient's history were reviewed and updated as appropriate: allergies, current medications, past family history, past medical history, past social history, past surgical history and problem list.    Objective   Visit Vitals  /72 (BP Location: Right arm, Patient Position: Sitting, Cuff Size: Adult)   Pulse 77   Temp 98.2 °F (36.8 °C) (Temporal)   Resp 16   Ht 157.5 cm (62\")   Wt 59 kg (130 lb) Comment: pt unable to provide weight in office due to cast   SpO2 99%   BMI 23.78 kg/m²        Physical Exam  Vitals and nursing note reviewed.   Constitutional:       General: She is not in acute distress.     Appearance: Normal appearance. She is well-developed, well-groomed and normal weight. She is not ill-appearing, toxic-appearing or diaphoretic.      Interventions: Face mask in place.   HENT:      Head: Normocephalic and atraumatic.      Right Ear: Hearing, tympanic membrane, ear canal and external ear normal.      Left Ear: Hearing, tympanic membrane, ear canal and external ear normal.   Eyes:      General: Lids are normal. No scleral icterus.     Extraocular Movements: Extraocular movements intact.   Neck:      Trachea: Phonation normal.   Cardiovascular:      Rate and Rhythm: Normal rate and regular rhythm.      Heart sounds: Normal heart sounds.   Pulmonary:      Effort: Pulmonary effort is normal.      Breath sounds: Normal breath sounds.   Abdominal:      General: There is no distension.      Palpations: Abdomen is soft.      Tenderness: There is no abdominal tenderness.   Musculoskeletal:         General: Deformity (pink cast right lower extremity; brace left ankle; non-weight bearing right LE. using scooter) present.      Cervical back: Neck supple.   Skin:     Coloration: Skin is not jaundiced or pale.   Neurological:      General: No focal deficit present.      Mental Status: She is alert and oriented to person, place, and time.      Motor: Motor function is " intact.   Psychiatric:         Attention and Perception: Attention and perception normal.         Mood and Affect: Mood and affect normal.         Speech: Speech normal.         Behavior: Behavior normal. Behavior is cooperative.         Thought Content: Thought content normal.         Cognition and Memory: Cognition and memory normal.         Judgment: Judgment normal.         Lab Results   Component Value Date    CHOL 238 (H) 01/24/2017    CHLPL 133 02/02/2021    TRIG 51 02/02/2021    HDL 68 (H) 02/02/2021    LDL 53 02/02/2021     Lab Results   Component Value Date    TSH 2.770 03/24/2021     Lab Results   Component Value Date    FREET4 1.10 03/24/2021     Lab Results   Component Value Date    HGBA1C 4.6 03/25/2016    HGBA1C 5.1 06/24/2014     SCANNED - IMAGING (02/14/2020)      Assessment     Problem List Items Addressed This Visit        Cardiac and Vasculature    Mixed hyperlipidemia (Chronic)    Relevant Orders    Comprehensive Metabolic Panel    Lipid Panel       Endocrine and Metabolic    Acquired hypothyroidism (Chronic)    Relevant Orders    CBC (No Diff)    TSH+Free T4    Vitamin D deficiency (Chronic)    Relevant Orders    Phosphorus    Comprehensive Metabolic Panel    Magnesium    Vitamin D 25 Hydroxy    PTH, Intact      Other Visit Diagnoses     Annual physical exam    -  Primary    Other osteoporosis without current pathological fracture        postponing dexa until next year    Relevant Orders    Phosphorus    CBC (No Diff)    Comprehensive Metabolic Panel    Magnesium    TSH+Free T4    Vitamin D 25 Hydroxy    PTH, Intact    Abnormal blood chemistry        pt is on B complex vitamin    Relevant Orders    Phosphorus    CBC (No Diff)    Comprehensive Metabolic Panel    Magnesium    Lipid Panel    TSH+Free T4    Vitamin D 25 Hydroxy    PTH, Intact    Vitamin B12    Folate    Iron deficiency anemia, unspecified iron deficiency anemia type        donates blood; discussed otc iron replacement    Relevant  Orders    CBC (No Diff)    TSH+Free T4    Ferritin    Iron Profile          · Health maintenance information provided with patient plan.   · Counseled on age appropriate health screenings.  · Immunizations for age discussed, encouraged.   · Encourage healthy habits such as exercise, healthy diet.  BMI is within normal parameters. No other follow-up for BMI required.  ·   · Return in about 1 year (around 5/18/2023) for Annual physical.     Priyanka Zacarias MD

## 2022-05-19 DIAGNOSIS — E03.9 ACQUIRED HYPOTHYROIDISM: Chronic | ICD-10-CM

## 2022-05-19 DIAGNOSIS — E78.2 MIXED HYPERLIPIDEMIA: Primary | ICD-10-CM

## 2022-05-19 LAB
25(OH)D3+25(OH)D2 SERPL-MCNC: 35.9 NG/ML (ref 30–100)
ALBUMIN SERPL-MCNC: 4 G/DL (ref 3.5–5.2)
ALBUMIN/GLOB SERPL: 1.7 G/DL
ALP SERPL-CCNC: 75 U/L (ref 39–117)
ALT SERPL-CCNC: 11 U/L (ref 1–33)
AST SERPL-CCNC: 15 U/L (ref 1–32)
BILIRUB SERPL-MCNC: 0.3 MG/DL (ref 0–1.2)
BUN SERPL-MCNC: 12 MG/DL (ref 6–20)
BUN/CREAT SERPL: 16.7 (ref 7–25)
CALCIUM SERPL-MCNC: 9.6 MG/DL (ref 8.6–10.5)
CHLORIDE SERPL-SCNC: 108 MMOL/L (ref 98–107)
CHOLEST SERPL-MCNC: 144 MG/DL (ref 0–200)
CO2 SERPL-SCNC: 25.8 MMOL/L (ref 22–29)
CREAT SERPL-MCNC: 0.72 MG/DL (ref 0.57–1)
EGFRCR SERPLBLD CKD-EPI 2021: 97.7 ML/MIN/1.73
ERYTHROCYTE [DISTWIDTH] IN BLOOD BY AUTOMATED COUNT: 13.3 % (ref 12.3–15.4)
FERRITIN SERPL-MCNC: 15.9 NG/ML (ref 13–150)
FOLATE SERPL-MCNC: >20 NG/ML (ref 4.78–24.2)
GLOBULIN SER CALC-MCNC: 2.4 GM/DL
GLUCOSE SERPL-MCNC: 95 MG/DL (ref 65–99)
HCT VFR BLD AUTO: 35.2 % (ref 34–46.6)
HDLC SERPL-MCNC: 68 MG/DL (ref 40–60)
HGB BLD-MCNC: 11.7 G/DL (ref 12–15.9)
IRON SATN MFR SERPL: 12 % (ref 20–50)
IRON SERPL-MCNC: 45 MCG/DL (ref 37–145)
LDLC SERPL CALC-MCNC: 65 MG/DL (ref 0–100)
MAGNESIUM SERPL-MCNC: 2.1 MG/DL (ref 1.6–2.6)
MCH RBC QN AUTO: 29.4 PG (ref 26.6–33)
MCHC RBC AUTO-ENTMCNC: 33.2 G/DL (ref 31.5–35.7)
MCV RBC AUTO: 88.4 FL (ref 79–97)
PHOSPHATE SERPL-MCNC: 3.9 MG/DL (ref 2.5–4.5)
PLATELET # BLD AUTO: 190 10*3/MM3 (ref 140–450)
POTASSIUM SERPL-SCNC: 4.5 MMOL/L (ref 3.5–5.2)
PROT SERPL-MCNC: 6.4 G/DL (ref 6–8.5)
PTH-INTACT SERPL-MCNC: 25 PG/ML (ref 15–65)
RBC # BLD AUTO: 3.98 10*6/MM3 (ref 3.77–5.28)
SODIUM SERPL-SCNC: 143 MMOL/L (ref 136–145)
T4 FREE SERPL-MCNC: 1.24 NG/DL (ref 0.93–1.7)
TIBC SERPL-MCNC: 378 MCG/DL
TRIGL SERPL-MCNC: 52 MG/DL (ref 0–150)
TSH SERPL DL<=0.005 MIU/L-ACNC: 2.75 UIU/ML (ref 0.27–4.2)
UIBC SERPL-MCNC: 333 MCG/DL (ref 112–346)
VIT B12 SERPL-MCNC: 498 PG/ML (ref 211–946)
VLDLC SERPL CALC-MCNC: 11 MG/DL (ref 5–40)
WBC # BLD AUTO: 6.18 10*3/MM3 (ref 3.4–10.8)

## 2022-05-19 RX ORDER — ROSUVASTATIN CALCIUM 10 MG/1
10 TABLET, COATED ORAL NIGHTLY
Qty: 90 TABLET | Refills: 3 | Status: SHIPPED | OUTPATIENT
Start: 2022-05-19 | End: 2022-06-01 | Stop reason: SDUPTHER

## 2022-05-19 RX ORDER — LEVOTHYROXINE SODIUM 50 MCG
50 TABLET ORAL DAILY
Qty: 90 TABLET | Refills: 3 | Status: SHIPPED | OUTPATIENT
Start: 2022-05-19 | End: 2022-07-18

## 2022-05-19 NOTE — PROGRESS NOTES
Mildly anemic, but improved. Vitamin D low normal. Suggest taking vitamin D3 over the counter 2000 IU daily and trying to get 15 min of sun exposure daily to face and arms (when possible). All other labs okay. PTH level (parathyroid hormone) pending, will release when resulted, if abnormal will send another message.

## 2022-05-23 DIAGNOSIS — Z23 NEED FOR PNEUMOCOCCAL VACCINATION: Primary | ICD-10-CM

## 2022-06-01 DIAGNOSIS — E78.2 MIXED HYPERLIPIDEMIA: ICD-10-CM

## 2022-06-01 RX ORDER — ROSUVASTATIN CALCIUM 10 MG/1
10 TABLET, COATED ORAL NIGHTLY
Qty: 90 TABLET | Refills: 3 | Status: SHIPPED | OUTPATIENT
Start: 2022-06-01

## 2022-06-01 NOTE — TELEPHONE ENCOUNTER
Rx Refill Note  Requested Prescriptions     Pending Prescriptions Disp Refills   • rosuvastatin (CRESTOR) 10 MG tablet 90 tablet 3     Sig: Take 1 tablet by mouth Every Night. To lower cholesterol and risk of stroke.      Last office visit with prescribing clinician: 5/18/2022      Next office visit with prescribing clinician: Visit date not found            Desirae Castaneda LPN  06/01/22, 15:52 EDT

## 2022-06-23 DIAGNOSIS — F34.1 DYSTHYMIA: ICD-10-CM

## 2022-06-23 RX ORDER — BUPROPION HYDROCHLORIDE 300 MG/1
300 TABLET ORAL EVERY MORNING
Qty: 90 TABLET | Refills: 3 | Status: SHIPPED | OUTPATIENT
Start: 2022-06-23

## 2022-07-16 DIAGNOSIS — E03.9 ACQUIRED HYPOTHYROIDISM: Chronic | ICD-10-CM

## 2022-07-18 RX ORDER — LEVOTHYROXINE SODIUM 50 MCG
TABLET ORAL
Qty: 90 TABLET | Refills: 0 | Status: SHIPPED | OUTPATIENT
Start: 2022-07-18 | End: 2022-10-14

## 2022-08-01 RX ORDER — RALOXIFENE HYDROCHLORIDE 60 MG/1
TABLET, FILM COATED ORAL
Qty: 90 TABLET | Refills: 3 | Status: SHIPPED | OUTPATIENT
Start: 2022-08-01

## 2022-08-29 ENCOUNTER — TELEPHONE (OUTPATIENT)
Dept: URGENT CARE | Facility: CLINIC | Age: 57
End: 2022-08-29

## 2022-08-29 DIAGNOSIS — U07.1 COVID-19: Primary | ICD-10-CM

## 2022-08-29 PROCEDURE — U0004 COV-19 TEST NON-CDC HGH THRU: HCPCS | Performed by: NURSE PRACTITIONER

## 2022-09-06 ENCOUNTER — TELEPHONE (OUTPATIENT)
Dept: INTERNAL MEDICINE | Facility: CLINIC | Age: 57
End: 2022-09-06

## 2022-09-06 NOTE — TELEPHONE ENCOUNTER
Caller: Maddi Betancur    Relationship to patient: Self    Best call back number:[phone]    Date of positive COVID19 test: 08/29/2022    COVID19 symptoms:   COUGH AND CONGESTION/ SINUS PRESSURE AND PAIN/ SINUS HEADACHE    Date of initial quarantine: 08/29/2022    Additional information or concerns:   PATIENT STATED THAT SHE TESTED POSITIVE FOR COVID 08/29/2022 AND WAS SEEN BY URGENT CARE AND WAS GIVEN THE MEDICATION PAXLOVID AND FINISHED THE MEDICATION 09/03/2022 AND STILL HAS A TERRIBLE COUGH AND CONGESTION AND WOULD LIKE FOR MEDICATION TO BE CALLED INTO THE PHARMACY TO HELP WITH LINGERING COVID SYMPTOMS     What is the patients preferred pharmacy:   [id number] - [place], [place] - [address] - [phone]  - [phone]   [phone]

## 2022-09-06 NOTE — TELEPHONE ENCOUNTER
Symptomatic treatment. Cough meds over the counter. Mucinex d m is good to take. Has to watch for rebound covid symptoms with that med (happens approx 8 days after completing med).

## 2022-09-09 ENCOUNTER — HOSPITAL ENCOUNTER (EMERGENCY)
Facility: HOSPITAL | Age: 57
Discharge: HOME | End: 2022-09-09
Attending: EMERGENCY MEDICINE | Admitting: EMERGENCY MEDICINE

## 2022-09-09 ENCOUNTER — APPOINTMENT (OUTPATIENT)
Dept: GENERAL RADIOLOGY | Facility: HOSPITAL | Age: 57
End: 2022-09-09

## 2022-09-09 VITALS
TEMPERATURE: 98 F | BODY MASS INDEX: 23.92 KG/M2 | HEIGHT: 62 IN | WEIGHT: 130 LBS | HEART RATE: 79 BPM | DIASTOLIC BLOOD PRESSURE: 74 MMHG | SYSTOLIC BLOOD PRESSURE: 118 MMHG | OXYGEN SATURATION: 95 % | RESPIRATION RATE: 18 BRPM

## 2022-09-09 DIAGNOSIS — J40 BRONCHITIS: Primary | ICD-10-CM

## 2022-09-09 PROCEDURE — 71045 X-RAY EXAM CHEST 1 VIEW: CPT

## 2022-09-09 PROCEDURE — 99282 EMERGENCY DEPT VISIT SF MDM: CPT

## 2022-09-09 RX ORDER — METHYLPREDNISOLONE 4 MG/1
1 TABLET ORAL DAILY
Qty: 21 TABLET | Refills: 0 | Status: SHIPPED | OUTPATIENT
Start: 2022-09-09 | End: 2022-09-14

## 2022-09-09 NOTE — ED PROVIDER NOTES
Subjective   PIT    History of Present Illness  57-year-old female presents emerged department with cough, congestion, runny nose and is concerned about COVID.  She was recently diagnosed with COVID and treated with antivirals, she reports that she tested positive a week ago, and is afraid the symptoms have returned    History provided by:  Patient   used: No        Review of Systems   HENT: Positive for congestion.    Respiratory: Positive for cough.    All other systems reviewed and are negative.      Past Medical History:   Diagnosis Date   • Acid reflux    • Acquired hypothyroidism 02/06/2021   • Anemia    • Asthma     does not use inhaler and has weight loss   • Atypical lobular hyperplasia (ALH) of breast    • BMI 34.0-34.9,adult    • Cervical cancer (HCC)     stage 1 or less in early 20's (no chemo or radiation). All pap smears negative since   • Chicken pox     6 weeks of age   • Depression    • Hyperlipidemia 2018   • Keloid scar of skin 11/2019   • Measles     9 weeks of age   • Osteopenia 2021       Allergies   Allergen Reactions   • Prolia [Denosumab] Other (See Comments)     Severe joint pain   • Strawberry Rash       Past Surgical History:   Procedure Laterality Date   • ANKLE LIGAMENT RECONSTRUCTION Right 04/21/2022   • BILATERAL BREAST REDUCTION  11/04/2019   • BUNIONECTOMY Left 11/26/2018   • BUNIONECTOMY Right 2020   • CLOSED MANIPULATION SHOULDER Right    • COLONOSCOPY N/A 04/02/2021    Procedure: COLONOSCOPY with cold snare and cold biopsy polypectomies;  Surgeon: Tasha Vallejo MD;  Location: [facility] ENDOSCOPY;  Service: Gastroenterology;  Laterality: N/A;   • DIAGNOSTIC LAPAROSCOPY     • ENDOSCOPY N/A 08/06/2021    Procedure: ESOPHAGOGASTRODUODENOSCOPY WITH BIOPSY and polypectomy;  Surgeon: Tasha Vallejo MD;  Location: [facility] ENDOSCOPY;  Service: Gastroenterology;  Laterality: N/A;   • EXCISION URETHRAL DIVERTICULUM FEMALE  08/04/2021    Repair   • EXPLORATORY  LAPAROTOMY     • GANGLION CYST EXCISION     • GYNECOLOGIC CRYOSURGERY      for cervical CA   • HYSTERECTOMY     • REDUCTION MAMMAPLASTY     • RHINOPLASTY         Family History   Problem Relation Age of Onset   • Lung cancer Mother    • Cervical cancer Mother    • Lymphoma Mother         non-Hodgkin's   • Hyperlipidemia Mother    • Hypertension Mother    • Cancer Mother    • Breast cancer Maternal Aunt    • Colon cancer Maternal Aunt         40s   • Heart disease Father    • Hyperlipidemia Father        Social History     Socioeconomic History   • Marital status:    Tobacco Use   • Smoking status: Former Smoker     Packs/day: 1.00     Years: 15.00     Pack years: 15.00     Types: Cigarettes     Quit date: 10/1/1998     Years since quittin.9   • Smokeless tobacco: Never Used   • Tobacco comment: quit more than 20 years ago   Vaping Use   • Vaping Use: Never used   Substance and Sexual Activity   • Alcohol use: No   • Drug use: No   • Sexual activity: Yes     Partners: Male     Birth control/protection: Surgical     Comment: Hysterectomy           Objective   Physical Exam  Vitals and nursing note reviewed.   Constitutional:       Appearance: She is well-developed.   HENT:      Head: Normocephalic and atraumatic.   Cardiovascular:      Rate and Rhythm: Normal rate and regular rhythm.   Pulmonary:      Effort: Pulmonary effort is normal.      Breath sounds: Normal breath sounds.   Abdominal:      Palpations: Abdomen is soft.   Musculoskeletal:         General: Normal range of motion.      Cervical back: Normal range of motion and neck supple.   Skin:     General: Skin is warm and dry.   Neurological:      Mental Status: She is alert and oriented to person, place, and time.      Deep Tendon Reflexes: Reflexes are normal and symmetric.         Procedures           ED Course                                           MDM  Number of Diagnoses or Management Options  Bronchitis: new and requires workup     Amount  and/or Complexity of Data Reviewed  Tests in the radiology section of CPT®: reviewed    Risk of Complications, Morbidity, and/or Mortality  Presenting problems: minimal  Management options: minimal    Patient Progress  Patient progress: stable      Final diagnoses:   Bronchitis       ED Disposition  ED Disposition     ED Disposition   Discharge    Condition   Stable    Comment   --             [facility]  [address] [id number] [phone]    If symptoms worsen         Medication List      No changes were made to your prescriptions during this visit.          Sean Guillen Jr., PA-C  09/09/22 1347       Sean Guillen Jr., PA-C  09/18/22 124

## 2022-09-12 ENCOUNTER — OFFICE VISIT (OUTPATIENT)
Dept: GYNECOLOGIC ONCOLOGY | Facility: CLINIC | Age: 57
End: 2022-09-12

## 2022-09-12 VITALS
TEMPERATURE: 99.1 F | HEART RATE: 70 BPM | DIASTOLIC BLOOD PRESSURE: 61 MMHG | BODY MASS INDEX: 24.02 KG/M2 | SYSTOLIC BLOOD PRESSURE: 104 MMHG | WEIGHT: 131.3 LBS | RESPIRATION RATE: 15 BRPM | OXYGEN SATURATION: 98 %

## 2022-09-12 DIAGNOSIS — Z91.89 AT HIGH RISK FOR BREAST CANCER: Primary | ICD-10-CM

## 2022-09-12 DIAGNOSIS — N60.92 ATYPICAL LOBULAR HYPERPLASIA (ALH) OF LEFT BREAST: Chronic | ICD-10-CM

## 2022-09-12 PROCEDURE — 99213 OFFICE O/P EST LOW 20 MIN: CPT | Performed by: NURSE PRACTITIONER

## 2022-09-12 NOTE — PROGRESS NOTES
GYN ONCOLOGY HIGH RISK CLINIC    Maddi Betancur  [id number]  1965    Subjective   Chief Complaint: Follow-up (High risk breast)      HISTORY OF PRESENT ILLNESS:       Maddi Betancur is a 56 y.o. year old female here today for her high risk visit. Patient underwent bilateral breast reduction in 11/2019 after a 70 lb weight loss through diet and exercise. Pathology from her reduction revealed atypical lobular hyperplasia (ALH). She has had negative genetic testing and found to have a lifetime risk of breast cancer up to 30.7% by CARLI/Amrita-Willow.     Patient performs regular home self breast exams, denies any new or concerning findings. Last mammogram was completed on 5/13/2022, results BiRADS 2. High risk MRI completed 11/12/2021, BiRADS I. She is on Evista for chemoprevention (started in 8/2020), doing well on medication.    Patient underwent right ankle surgery earlier this year and is scheduled to have left ankle done next month.     The current medication list and allergy list were reviewed and reconciled.     Past Medical History, Past Surgical History, Social History, Family History have been reviewed and are without significant changes except as mentioned.      Health Maintenance:    Regular self breast exam: yes  Mammogram: 5/13/2022. History of abnormal mammogram: no (ALH incidentally found upon path from reduction mammaplasty)  Breast MRI: 11/12/2021. Results: negative    Risk Asessment: Carli/Amrita-Willow: 30.7%, h/o ALH      Review of Systems   Constitutional: Negative.    Respiratory: Negative.    Cardiovascular: Negative.    Skin:         keloid scars from breast reduction       Objective   Physical Exam  Vital Signs: /61   Pulse 70   Temp 99.1 °F (37.3 °C)   Resp 15   Wt 59.6 kg (131 lb 4.8 oz)   SpO2 98%   BMI 24.02 kg/m²    Wt Readings from Last 3 Encounters:   09/12/22 0847 59.6 kg (131 lb 4.8 oz)   09/09/22 1147 59 kg (130 lb)   08/29/22 0914 59 kg (130 lb)       Vitals:     09/12/22 0847   PainSc: 0-No pain           General Appearance:  alert, cooperative, no apparent distress, appears stated age and normal weight   Neurologic/Psychiatric: A&O x 3, gait steady, appropriate affect   Breasts:  Symmetrical, no masses, no lesions, no nipple discharge and scarring consistent with bilateral breast reduction. Bilateral lower horizontal scars keloid. No tenderness noted today.    Abdomen:   Soft, non-tender, non-distended and no organomegaly   Lymph nodes: No axillary adenopathy noted   Skin: No rashes, ulcers, or suspicious lesions noted              Assessment and Plan:    Diagnoses and all orders for this visit:    1. At high risk for breast cancer (Primary)  -     MRI Breast Bilateral Screening With & Without Contrast; Future    2. Atypical lobular hyperplasia (ALH) of left breast  -     MRI Breast Bilateral Screening With & Without Contrast; Future        Patient Education:  Reviewed screening schedule related to diagnosis as follows:  Monthly breast self-exam starting at age 18.  Clinical breast exam every 6 months.  Annual mammogram starting at age 40, or 10 years prior to the earliest diagnosis in the family.   Annual MRI starting at age 40, or 10 years prior to the earliest diagnosis in the family.   Consider chemoprevention for breast cancer risk reduction (tamoxifen/evista).     Discussed the purpose of high risk clinic in coordinating, scheduling, and planning screening interventions to include yearly mammograms, yearly screening breast MRI, Q6 month CBE, and monthly SBE. Colonoscopy and GYN screening per national guidelines.   Patient desires to move her annual exams to our office, due next in 3/2023. She does have a history of abnormal pap smears, s/p cryo and hysterectomy, will need periodic pap smears of the vaginal cuff. Colonoscopy UTD.   Reviewed technique for SBE including concerning findings to report.  Continue every 6 month clinical breast exams. WNL today, repeat with  annual in 6 months.   Discussed yearly screening mammogram. Results will be reported by breast imaging center and a copy of report will be sent to our office for review. Last mammogram normal in 5/2022, repeat in 5/2023.  Discussed screening breast MRI scheduling based on menstrual cycles, HRT. We also discussed the increased sensitivity of MRI screening and possibility of call- backs for additional imaging or biopsies to establish baseline. Repeat MRI due in 11/2022, will move out to mid December to allow for recovery from her upcoming left ankle surgery. Order placed today.   Discussed vitamin D as benefit to bone health and possible benefit to breast health. Recommended 1000 IU daily unless contraindicated  Discussed benefit of Vitamin E for fibrocystic breasts/breast pain  Discussed tamoxifen/evista use for chemoprevention including risk and benefits of use as well as side effects of medication. Patient initiated prescription Evista in 8/2020 and is happy with medication thus far, no side effects of note or complications, continue.       Pain assessment was performed today as a part of patient’s care. For patients with pain related to surgery, gynecologic malignancy or cancer treatment, the plan is as noted in the assessment/plan.  For patients with pain not related to these issues, they are to seek any further needed care from a more appropriate provider, such as PCP.            Return to clinic in 6 months for Annual Exam and High Risk visit.      Electronically signed by ALLAN Pelayo on 09/12/22 at 09:06 EDT

## 2022-09-14 ENCOUNTER — OFFICE VISIT (OUTPATIENT)
Dept: INTERNAL MEDICINE | Facility: CLINIC | Age: 57
End: 2022-09-14

## 2022-09-14 VITALS
RESPIRATION RATE: 16 BRPM | DIASTOLIC BLOOD PRESSURE: 72 MMHG | HEART RATE: 77 BPM | OXYGEN SATURATION: 100 % | WEIGHT: 132 LBS | TEMPERATURE: 99.3 F | HEIGHT: 62 IN | BODY MASS INDEX: 24.29 KG/M2 | SYSTOLIC BLOOD PRESSURE: 112 MMHG

## 2022-09-14 DIAGNOSIS — J32.0 MAXILLARY SINUSITIS, UNSPECIFIED CHRONICITY: ICD-10-CM

## 2022-09-14 DIAGNOSIS — R05.9 COUGH: Primary | ICD-10-CM

## 2022-09-14 PROCEDURE — 99214 OFFICE O/P EST MOD 30 MIN: CPT | Performed by: INTERNAL MEDICINE

## 2022-09-14 RX ORDER — ALBUTEROL SULFATE 90 UG/1
2 AEROSOL, METERED RESPIRATORY (INHALATION) EVERY 4 HOURS PRN
Qty: 8 G | Refills: 5 | Status: SHIPPED | OUTPATIENT
Start: 2022-09-14

## 2022-09-14 RX ORDER — AMOXICILLIN AND CLAVULANATE POTASSIUM 875; 125 MG/1; MG/1
1 TABLET, FILM COATED ORAL EVERY 12 HOURS SCHEDULED
Qty: 20 TABLET | Refills: 0 | Status: SHIPPED | OUTPATIENT
Start: 2022-09-14 | End: 2022-12-19

## 2022-09-14 NOTE — PROGRESS NOTES
Subjective     Patient ID: Maddi Betancur is a 56 y.o. female. Patient is here for management of multiple medical problems.     Chief Complaint   Patient presents with   • Bronchitis   • Pressure Behind the Eyes   • Earache     Right ear     History of Present Illness       covid on 8/28    Bronchitis.      Now with sinus pressure and pain.        The following portions of the patient's history were reviewed and updated as appropriate: allergies, current medications, past family history, past medical history, past social history, past surgical history and problem list.    Review of Systems    Current Outpatient Medications:   •  albuterol sulfate  (90 Base) MCG/ACT inhaler, Inhale 2 puffs Every 4 (Four) Hours As Needed for Wheezing or Shortness of Air., Disp: 8 g, Rfl: 5  •  azelastine (OPTIVAR) 0.05 % ophthalmic solution, Apply 1 drop to eye(s) as directed by provider Every 12 (Twelve) Hours. As needed, Disp: , Rfl:   •  B Complex Vitamins (VITAMIN B COMPLEX PO), Take 1 tablet by mouth Daily., Disp: , Rfl:   •  betamethasone valerate (VALISONE) 0.1 % cream, Apply 1 application topically to the appropriate area as directed Daily. As needed for dry patchy areas, Disp: , Rfl:   •  buPROPion XL (WELLBUTRIN XL) 300 MG 24 hr tablet, Take 1 tablet by mouth Every Morning., Disp: 90 tablet, Rfl: 3  •  Calcium Carbonate-Vitamin D (TGT CALCIUM DIETARY SUPPLEMENT PO), Take 600 mg by mouth Daily., Disp: , Rfl:   •  EPIPEN 2-DONNA 0.3 MG/0.3ML solution auto-injector injection, Inject 0.3 mg into the appropriate muscle as directed by prescriber 1 (One) Time. As needed, Disp: , Rfl: 0  •  fluticasone (FLONASE) 50 MCG/ACT nasal spray, 2 sprays into the nostril(s) as directed by provider Daily. Uses prn, Disp: , Rfl:   •  Multiple Vitamins-Minerals (AIRBORNE PO), Take 1 tablet by mouth Daily., Disp: , Rfl:   •  raloxifene (EVISTA) 60 MG tablet, TAKE 1 TABLET DAILY, Disp: 90 tablet, Rfl: 3  •  rosuvastatin (CRESTOR) 10 MG  "tablet, Take 1 tablet by mouth Every Night. To lower cholesterol and risk of stroke., Disp: 90 tablet, Rfl: 3  •  Synthroid 50 MCG tablet, TAKE 1 TABLET DAILY ON AN  EMPTY STOMACH AND WAIT 30  MINUTES BEFORE EATING OR   TAKING OTHER MEDICATIONS, Disp: 90 tablet, Rfl: 0  •  amoxicillin-clavulanate (Augmentin) 875-125 MG per tablet, Take 1 tablet by mouth Every 12 (Twelve) Hours., Disp: 20 tablet, Rfl: 0    Objective      Blood pressure 112/72, pulse 77, temperature 99.3 °F (37.4 °C), resp. rate 16, height 157.5 cm (62\"), weight 59.9 kg (132 lb), SpO2 100 %, not currently breastfeeding.    Physical Exam     General Appearance:    Alert, cooperative, no distress, appears stated age   Head:    Normocephalic, without obvious abnormality, atraumatic   Eyes:    PERRL, conjunctiva/corneas clear, EOM's intact   Ears:    Normal TM's and external ear canals, both ears   Nose:   Nares normal, septum midline, mucosa normal, no drainage   or sinus tenderness   Throat:   Lips, mucosa, and tongue normal; teeth and gums normal   Neck:   Supple, symmetrical, trachea midline, no adenopathy;        thyroid:  No enlargement/tenderness/nodules; no carotid    bruit or JVD   Back:     Symmetric, no curvature, ROM normal, no CVA tenderness   Lungs:     Clear to auscultation bilaterally, respirations unlabored   Chest wall:    No tenderness or deformity   Heart:    Regular rate and rhythm, S1 and S2 normal, no murmur,        rub or gallop   Abdomen:     Soft, non-tender, bowel sounds active all four quadrants,     no masses, no organomegaly   Extremities:   Extremities normal, atraumatic, no cyanosis or edema   Pulses:   2+ and symmetric all extremities   Skin:   Skin color, texture, turgor normal, no rashes or lesions   Lymph nodes:   Cervical, supraclavicular, and axillary nodes normal   Neurologic:   CNII-XII intact. Normal strength, sensation and reflexes       throughout      Results for orders placed or performed during the hospital " encounter of 08/29/22   COVID-19 PCR, [facility], NP SWAB IN [facility] VIRAL TRANSPORT MEDIA/ORAL SWISH 24-30 HR TAT - Swab, Nasopharynx    Specimen: Nasopharynx; Swab   Result Value Ref Range    SARS-CoV-2 ALDEN Detected (C) Not Detected   Covid-19 + Flu A&B AG, Veritor [id number]    Specimen: Swab   Result Value Ref Range    COVID19 Not Detected Not Detected - Ref. Range    Influenza A Antigen LAYLA Not Detected (A) Not Detected    Influenza B Antigen LAYLA Not Detected Not Detected    Internal Control Passed Passed    Lot Number [id number]     Expiration Date 2,042,023          Assessment & Plan       Diagnoses and all orders for this visit:    1. Cough (Primary)  -     albuterol sulfate  (90 Base) MCG/ACT inhaler; Inhale 2 puffs Every 4 (Four) Hours As Needed for Wheezing or Shortness of Air.  Dispense: 8 g; Refill: 5    2. Maxillary sinusitis, unspecified chronicity    Other orders  -     amoxicillin-clavulanate (Augmentin) 875-125 MG per tablet; Take 1 tablet by mouth Every 12 (Twelve) Hours.  Dispense: 20 tablet; Refill: 0      No follow-ups on file.          There are no Patient Instructions on file for this visit.     Bruno Harris MD    Assessment & Plan       Answers for HPI/ROS submitted by the patient on 9/14/2022  Please describe your symptoms.: Sinus pressure and drainage  Have you had these symptoms before?: Yes  How long have you been having these symptoms?: Greater than 2 weeks  Please list any medications you are currently taking for this condition.: Mucinex DM, Zyrtec  Please describe any probable cause for these symptoms. : Started with COVID, then bronchitis, possibly sinus infection  What is the primary reason for your visit?: Other

## 2022-10-14 DIAGNOSIS — E03.9 ACQUIRED HYPOTHYROIDISM: Chronic | ICD-10-CM

## 2022-10-14 RX ORDER — LEVOTHYROXINE SODIUM 50 MCG
TABLET ORAL
Qty: 90 TABLET | Refills: 0 | Status: SHIPPED | OUTPATIENT
Start: 2022-10-14 | End: 2023-01-13

## 2022-11-10 ENCOUNTER — PATIENT MESSAGE (OUTPATIENT)
Dept: INTERNAL MEDICINE | Facility: CLINIC | Age: 57
End: 2022-11-10

## 2022-11-10 DIAGNOSIS — M81.8 OTHER OSTEOPOROSIS WITHOUT CURRENT PATHOLOGICAL FRACTURE: Primary | ICD-10-CM

## 2022-11-29 ENCOUNTER — HOSPITAL ENCOUNTER (OUTPATIENT)
Dept: MRI IMAGING | Facility: HOSPITAL | Age: 57
Discharge: HOME | End: 2022-11-29
Admitting: NURSE PRACTITIONER

## 2022-11-29 DIAGNOSIS — N60.92 ATYPICAL LOBULAR HYPERPLASIA (ALH) OF LEFT BREAST: Chronic | ICD-10-CM

## 2022-11-29 DIAGNOSIS — Z91.89 AT HIGH RISK FOR BREAST CANCER: ICD-10-CM

## 2022-11-29 PROCEDURE — 77049 MRI BREAST C-+ W/CAD BI: CPT | Performed by: RADIOLOGY

## 2022-11-29 PROCEDURE — 77049 MRI BREAST C-+ W/CAD BI: CPT

## 2022-11-29 PROCEDURE — A9577 INJ MULTIHANCE: HCPCS | Performed by: NURSE PRACTITIONER

## 2022-11-29 PROCEDURE — 0 GADOBENATE DIMEGLUMINE 529 MG/ML SOLUTION: Performed by: NURSE PRACTITIONER

## 2022-11-29 RX ADMIN — GADOBENATE DIMEGLUMINE 11 ML: 529 INJECTION, SOLUTION INTRAVENOUS at 09:08

## 2022-12-09 ENCOUNTER — TELEPHONE (OUTPATIENT)
Dept: MRI IMAGING | Facility: HOSPITAL | Age: 57
End: 2022-12-09

## 2022-12-15 ENCOUNTER — TELEPHONE (OUTPATIENT)
Dept: INTERNAL MEDICINE | Facility: CLINIC | Age: 57
End: 2022-12-15

## 2022-12-15 NOTE — TELEPHONE ENCOUNTER
DEXA from 11/30/22 reviewed.     Osteopenic in lumbar spine but improved from last check.    Femoral neck normal range.     continue Evista. Suggest calcium-rich foods in diet. Vitamin D supplement 800-1000 units daily. Weight-bearing exercise. Monitor thyroid level over time--want to avoid hyperthyroid state.     Consider scheduling her annual exam, due 5/19/22. If she wants to do labs prior to the visit so that we can discuss the results at the visit let me know. Once the visit is scheduled I can order the labs.

## 2023-01-12 DIAGNOSIS — E03.9 ACQUIRED HYPOTHYROIDISM: Chronic | ICD-10-CM

## 2023-01-13 RX ORDER — LEVOTHYROXINE SODIUM 50 MCG
TABLET ORAL
Qty: 90 TABLET | Refills: 0 | Status: SHIPPED | OUTPATIENT
Start: 2023-01-13

## 2023-01-13 NOTE — TELEPHONE ENCOUNTER
Rx Refill Note  Requested Prescriptions     Pending Prescriptions Disp Refills   • Synthroid 50 MCG tablet [Pharmacy Med Name: SYNTHROID TAB 50MCG] 90 tablet 0     Sig: TAKE 1 TABLET DAILY ON AN  EMPTY STOMACH AND WAIT 30  MINUTES BEFORE EATING OR   TAKING OTHER MEDICATIONS      Last office visit with prescribing clinician: 9/14/2022   Next office visit with prescribing clinician: Visit date not found       TSH- 05/18/2022    Cristiane Lopez MA  01/13/23, 13:14 EST

## 2023-03-13 ENCOUNTER — OFFICE VISIT (OUTPATIENT)
Dept: GYNECOLOGIC ONCOLOGY | Facility: CLINIC | Age: 58
End: 2023-03-13
Payer: COMMERCIAL

## 2023-03-13 VITALS
HEIGHT: 63 IN | OXYGEN SATURATION: 97 % | WEIGHT: 130.4 LBS | TEMPERATURE: 98.7 F | DIASTOLIC BLOOD PRESSURE: 58 MMHG | SYSTOLIC BLOOD PRESSURE: 118 MMHG | HEART RATE: 54 BPM | RESPIRATION RATE: 17 BRPM | BODY MASS INDEX: 23.11 KG/M2

## 2023-03-13 DIAGNOSIS — Z01.419 WELL WOMAN EXAM WITH ROUTINE GYNECOLOGICAL EXAM: Primary | ICD-10-CM

## 2023-03-13 DIAGNOSIS — Z91.89 AT HIGH RISK FOR BREAST CANCER: ICD-10-CM

## 2023-03-13 DIAGNOSIS — N60.92 ATYPICAL LOBULAR HYPERPLASIA (ALH) OF LEFT BREAST: Chronic | ICD-10-CM

## 2023-03-13 PROCEDURE — 99396 PREV VISIT EST AGE 40-64: CPT | Performed by: NURSE PRACTITIONER

## 2023-03-13 NOTE — PROGRESS NOTES
GYN ONCOLOGY ANNUAL WELL WOMAN VISIT      Maddi Betancur  [id number]  1965      Subjective   Chief Complaint: Annual Exam        History of present illness:      Maddi Betancur is a 57 y.o. year old female who is here today for an annual exam. Patient underwent bilateral breast reduction in 2019 after a 70 lb weight loss through diet and exercise. Pathology from her reduction revealed atypical lobular hyperplasia (ALH). She has had negative genetic testing and found to have a lifetime risk of breast cancer up to 30.7% by CARLI/Stephanie. She has been followed by the [facility] since that time and is now having her annual exams here as well.     She performs regular home self breast exams, denies any new or concerning finding, keloid scars unchanged. She began Evista in 2020 for chemoprevention, continues to be happy with the medication. Breast imaging is currently UTD.   She reports she is feeling very well today and has no complaints. She denies vaginal bleeding, pelvic pain, changes in bowel or bladder function, new or concerning lesions, and breast problems. Well woman screenings currently UTD. She has a history of very early cervical cancer in her 20's (prior to having her children) that was treated conservatively to maintain fertility. She later underwent hysterectomy with ovarian preservation at age 45.       Obstetric History:  OB History        4    Para   3    Term   3            AB   1    Living   3       SAB   1    IAB        Ectopic        Molar        Multiple        Live Births                   Menstrual History:     No LMP recorded. Patient has had a hysterectomy.          The current medication list and allergy list were reviewed and reconciled.     Past Medical History, Past Surgical History, Social History, Family History have been reviewed and are without significant changes except as mentioned.    Health Maintenance:  Last colonoscopy was 2021, with recommended  "follow-up in 3 year(s). Last pap smear was 3/2022, results were normal PAP. DEXA followed closely by PCP, h/o osteoporosis.   Regular self breast exam: yes  Mammogram: 5/13/2022. History of abnormal mammogram: no (ALH incidentally found upon path from reduction mammaplasty)  Breast MRI: 11/29/2022. Results: negative     Risk Asessment: Anuradha/Tyrer-Cuzick: 30.7%, h/o ALH      Review of Systems   Constitutional: Negative.    Gastrointestinal: Negative.    Genitourinary: Negative.    Musculoskeletal: Positive for arthralgias.       Objective   Physical Exam  Vital Signs: /58   Pulse 54   Temp 98.7 °F (37.1 °C) (Temporal)   Resp 17   Ht 158.8 cm (62.52\")   Wt 59.1 kg (130 lb 6.4 oz)   SpO2 97%   BMI 23.46 kg/m²   Vitals:    03/13/23 0837   PainSc: 0-No pain           General Appearance:  alert, cooperative, no apparent distress, appears stated age and normal weight   Neurologic/Psychiatric: A&O x 3, gait steady, appropriate affect   Lungs:   Clear to auscultation bilaterally; respirations regular, even, and unlabored bilaterally   Heart:  Regular rate and rhythm, no murmurs appreciated   Breasts:  Symmetrical, no masses, no lesions, no nipple discharge and scarring consistent with bilateral breast reduction. Bilateral lower horizontal scars keloid. No tenderness noted today.    Abdomen:   Soft, non-tender, non-distended and no organomegaly   Lymph nodes: No cervical, supraclavicular, inguinal or axillary adenopathy noted   Extremities: Normal, atraumatic; no clubbing, cyanosis, or edema    Skin: No rashes, ulcers, or suspicious lesions noted   Pelvic: External Genitalia  without lesions or skin changes  Vagina  is pink, moist, without lesions.   Vaginal Cuff  Female Vaginal Cuff: smooth, intact, without visible lesions and pap obtained  Uterus  surgically absent  Ovaries  without palpable masses or fullness  Parametria  smooth  Rectovaginal  Female rectovaginal: deferred     ECOG score: 0              "   Assessment and Plan:      Diagnoses and all orders for this visit:    1. Well woman exam with routine gynecological exam (Primary)    2. Atypical lobular hyperplasia (ALH) of left breast    3. At high risk for breast cancer         Pap was done today. If she does not receive the results of the Pap within 2 weeks  time, she was instructed to call to find out the results.      Reviewed screening schedule related to high risk for breast cancer as follows:  Monthly breast self-exam starting at age 18.  Clinical breast exam every 6 months.  Annual mammogram starting at age 40, or 10 years prior to the earliest diagnosis in the family.   Annual MRI starting at age 40, or 10 years prior to the earliest diagnosis in the family.   Consider chemoprevention for breast cancer risk reduction (tamoxifen/evista).      Discussed the purpose of high risk clinic in coordinating, scheduling, and planning screening interventions to include yearly mammograms, yearly screening breast MRI, Q6 month CBE, and monthly SBE. Colonoscopy and GYN screening per national guidelines.   Next annual exam in 1 year. She does have a history of early cervical cancer, s/p cryo and hysterectomy, will continue periodic pap smears of the vaginal cuff. Colonoscopy UTD, repeat in 2024 or sooner if new problems.   Reviewed technique for SBE including concerning findings to report.  Continue every 6 month clinical breast exams. CBE normal today.  Discussed yearly screening mammogram. Results will be reported by breast imaging center and a copy of report will be sent to our office for review. Last mammogram normal in 5/2022, repeat in 5/2023.  Discussed screening breast MRI scheduling based on menstrual cycles, HRT. We also discussed the increased sensitivity of MRI screening and possibility of call- backs for additional imaging or biopsies to establish baseline. Repeat MRI due in 11/2023, will order at next visit  Discussed vitamin D as benefit to bone health  and possible benefit to breast health. Recommended 1000 IU daily unless contraindicated  Discussed benefit of Vitamin E for fibrocystic breasts/breast pain  Discussed tamoxifen/evista use for chemoprevention including risk and benefits of use as well as side effects of medication. Patient initiated prescription Evista in 8/2020 and is happy with medication thus far, no side effects of note or complications, continue.    Pain assessment was performed today as a part of patient’s care. For patients with pain related to surgery, gynecologic malignancy or cancer treatment, the plan is as noted in the assessment/plan.  For patients with pain not related to these issues, they are to seek any further needed care from a more appropriate provider, such as PCP.      Follow-up:     Return to clinic in 6 months for High Risk visit.      Electronically signed by ALLAN Pelayo on 03/13/23 at 08:45 EDT

## 2023-03-15 LAB — REF LAB TEST METHOD: NORMAL

## 2023-03-16 DIAGNOSIS — R87.629 ABNORMAL VAGINAL PAP SMEAR: Primary | ICD-10-CM

## 2023-03-20 LAB — REF LAB TEST METHOD: NORMAL

## 2023-03-21 ENCOUNTER — TELEPHONE (OUTPATIENT)
Dept: GYNECOLOGIC ONCOLOGY | Facility: CLINIC | Age: 58
End: 2023-03-21
Payer: COMMERCIAL

## 2023-03-21 NOTE — TELEPHONE ENCOUNTER
RN called patient and reported that the pap was ASCUS but not concerning. Repeat pap in 1 year. Pt verbalized understanding.

## 2023-03-21 NOTE — TELEPHONE ENCOUNTER
----- Message from Maddi Betancur sent at 3/21/2023  8:46 AM EDT -----  Regarding: PAP & HPV tests  Contact: [phone]  When you have a moment can you call me on my cell phone [phone] about the results

## 2023-03-29 ENCOUNTER — TRANSCRIBE ORDERS (OUTPATIENT)
Dept: ADMINISTRATIVE | Facility: HOSPITAL | Age: 58
End: 2023-03-29
Payer: COMMERCIAL

## 2023-03-29 DIAGNOSIS — Z12.31 ENCOUNTER FOR SCREENING MAMMOGRAM FOR MALIGNANT NEOPLASM OF BREAST: Primary | ICD-10-CM

## 2023-04-12 DIAGNOSIS — E03.9 ACQUIRED HYPOTHYROIDISM: Chronic | ICD-10-CM

## 2023-04-12 RX ORDER — LEVOTHYROXINE SODIUM 50 MCG
TABLET ORAL
Qty: 90 TABLET | Refills: 0 | Status: SHIPPED | OUTPATIENT
Start: 2023-04-12

## 2023-05-15 ENCOUNTER — HOSPITAL ENCOUNTER (OUTPATIENT)
Dept: MAMMOGRAPHY | Facility: HOSPITAL | Age: 58
Discharge: HOME | End: 2023-05-15
Admitting: NURSE PRACTITIONER
Payer: COMMERCIAL

## 2023-05-15 DIAGNOSIS — Z12.31 ENCOUNTER FOR SCREENING MAMMOGRAM FOR MALIGNANT NEOPLASM OF BREAST: ICD-10-CM

## 2023-05-15 PROCEDURE — 77063 BREAST TOMOSYNTHESIS BI: CPT | Performed by: RADIOLOGY

## 2023-05-15 PROCEDURE — 77063 BREAST TOMOSYNTHESIS BI: CPT

## 2023-05-15 PROCEDURE — 77067 SCR MAMMO BI INCL CAD: CPT

## 2023-05-15 PROCEDURE — 77067 SCR MAMMO BI INCL CAD: CPT | Performed by: RADIOLOGY

## 2023-05-22 ENCOUNTER — HOSPITAL ENCOUNTER (OUTPATIENT)
Dept: ULTRASOUND IMAGING | Facility: HOSPITAL | Age: 58
Discharge: HOME | End: 2023-05-22
Payer: COMMERCIAL

## 2023-05-22 ENCOUNTER — HOSPITAL ENCOUNTER (OUTPATIENT)
Dept: MAMMOGRAPHY | Facility: HOSPITAL | Age: 58
Discharge: HOME | End: 2023-05-22
Payer: COMMERCIAL

## 2023-05-22 DIAGNOSIS — R92.8 ABNORMAL MAMMOGRAM: ICD-10-CM

## 2023-05-22 PROCEDURE — 77065 DX MAMMO INCL CAD UNI: CPT | Performed by: RADIOLOGY

## 2023-05-22 PROCEDURE — G0279 TOMOSYNTHESIS, MAMMO: HCPCS

## 2023-05-22 PROCEDURE — 76642 ULTRASOUND BREAST LIMITED: CPT | Performed by: RADIOLOGY

## 2023-05-22 PROCEDURE — 77065 DX MAMMO INCL CAD UNI: CPT

## 2023-05-22 PROCEDURE — 77061 BREAST TOMOSYNTHESIS UNI: CPT | Performed by: RADIOLOGY

## 2023-05-22 PROCEDURE — 76642 ULTRASOUND BREAST LIMITED: CPT

## 2023-06-05 ENCOUNTER — TELEPHONE (OUTPATIENT)
Dept: GYNECOLOGIC ONCOLOGY | Facility: CLINIC | Age: 58
End: 2023-06-05
Payer: COMMERCIAL

## 2023-06-05 NOTE — TELEPHONE ENCOUNTER
Caller: Maddi Betancur    Relationship to patient: Self    Best call back number: [phone]    Type of visit: FOLLOW UP - HIGH RISK     Requested date: 09/06, 09/20, OR 09/27    If rescheduling, when is the original appointment: 09/13    Additional notes: PLEASE CALL ONCE RESCHEDULED.

## 2023-07-18 ENCOUNTER — ANESTHESIA EVENT CONVERTED (OUTPATIENT)
Dept: ANESTHESIOLOGY | Facility: HOSPITAL | Age: 58
End: 2023-07-18

## 2023-07-18 ENCOUNTER — HOSPITAL ENCOUNTER (OUTPATIENT)
Facility: HOSPITAL | Age: 58
Discharge: HOME | End: 2023-07-19
Attending: PLASTIC SURGERY | Admitting: PLASTIC SURGERY

## 2023-07-18 DIAGNOSIS — J45.909 ASTHMA, UNSPECIFIED ASTHMA SEVERITY, UNSPECIFIED WHETHER COMPLICATED, UNSPECIFIED WHETHER PERSISTENT: Primary | Chronic | ICD-10-CM

## 2023-07-18 PROBLEM — Z98.890 S/P COSMETIC PLASTIC SURGERY: Status: ACTIVE | Noted: 2023-07-18

## 2023-07-18 PROCEDURE — 25010000002 TRIAMCINOLONE PER 10 MG: Performed by: PLASTIC SURGERY

## 2023-07-18 PROCEDURE — 25010000002 FENTANYL CITRATE (PF) 50 MCG/ML SOLUTION

## 2023-07-18 PROCEDURE — 25010000002 HYDROMORPHONE 1 MG/ML SOLUTION

## 2023-07-18 DEVICE — DEV CONTRL TISS STRATAFIX SPIRAL MNCRYL UD 3/0 PLS 60CM: Type: IMPLANTABLE DEVICE | Site: BACK | Status: FUNCTIONAL

## 2023-07-18 RX ORDER — DROPERIDOL 2.5 MG/ML
0.62 INJECTION, SOLUTION INTRAMUSCULAR; INTRAVENOUS
Status: DISCONTINUED | OUTPATIENT
Start: 2023-07-18 | End: 2023-07-18 | Stop reason: HOSPADM

## 2023-07-18 RX ORDER — HYDROMORPHONE HYDROCHLORIDE 1 MG/ML
0.5 INJECTION, SOLUTION INTRAMUSCULAR; INTRAVENOUS; SUBCUTANEOUS
Status: DISCONTINUED | OUTPATIENT
Start: 2023-07-18 | End: 2023-07-18 | Stop reason: HOSPADM

## 2023-07-18 RX ORDER — DIAZEPAM 5 MG/1
5 TABLET ORAL EVERY 8 HOURS PRN
Status: DISCONTINUED | OUTPATIENT
Start: 2023-07-18 | End: 2023-07-19 | Stop reason: HOSPADM

## 2023-07-18 RX ORDER — DIPHENHYDRAMINE HYDROCHLORIDE 50 MG/ML
12.5 INJECTION INTRAMUSCULAR; INTRAVENOUS EVERY 6 HOURS PRN
Status: DISCONTINUED | OUTPATIENT
Start: 2023-07-18 | End: 2023-07-19 | Stop reason: HOSPADM

## 2023-07-18 RX ORDER — CEFAZOLIN SODIUM 2 G/100ML
2000 INJECTION, SOLUTION INTRAVENOUS ONCE
Status: COMPLETED | OUTPATIENT
Start: 2023-07-18 | End: 2023-07-18

## 2023-07-18 RX ORDER — TRIAMCINOLONE ACETONIDE 40 MG/ML
INJECTION, SUSPENSION INTRA-ARTICULAR; INTRAMUSCULAR AS NEEDED
Status: DISCONTINUED | OUTPATIENT
Start: 2023-07-18 | End: 2023-07-18 | Stop reason: HOSPADM

## 2023-07-18 RX ORDER — SODIUM CHLORIDE 0.9 % (FLUSH) 0.9 %
3 SYRINGE (ML) INJECTION EVERY 12 HOURS SCHEDULED
Status: DISCONTINUED | OUTPATIENT
Start: 2023-07-18 | End: 2023-07-18 | Stop reason: HOSPADM

## 2023-07-18 RX ORDER — ONDANSETRON 2 MG/ML
4 INJECTION INTRAMUSCULAR; INTRAVENOUS ONCE AS NEEDED
Status: DISCONTINUED | OUTPATIENT
Start: 2023-07-18 | End: 2023-07-18 | Stop reason: HOSPADM

## 2023-07-18 RX ORDER — ROSUVASTATIN CALCIUM 10 MG/1
10 TABLET, COATED ORAL NIGHTLY
Status: DISCONTINUED | OUTPATIENT
Start: 2023-07-18 | End: 2023-07-19 | Stop reason: HOSPADM

## 2023-07-18 RX ORDER — IPRATROPIUM BROMIDE AND ALBUTEROL SULFATE 2.5; .5 MG/3ML; MG/3ML
3 SOLUTION RESPIRATORY (INHALATION) ONCE AS NEEDED
Status: DISCONTINUED | OUTPATIENT
Start: 2023-07-18 | End: 2023-07-18 | Stop reason: HOSPADM

## 2023-07-18 RX ORDER — BUPROPION HYDROCHLORIDE 150 MG/1
300 TABLET ORAL EVERY MORNING
Status: DISCONTINUED | OUTPATIENT
Start: 2023-07-18 | End: 2023-07-19 | Stop reason: HOSPADM

## 2023-07-18 RX ORDER — DIAZEPAM 5 MG/1
TABLET ORAL
Status: COMPLETED
Start: 2023-07-18 | End: 2023-07-18

## 2023-07-18 RX ORDER — SODIUM CHLORIDE 9 MG/ML
40 INJECTION, SOLUTION INTRAVENOUS AS NEEDED
Status: DISCONTINUED | OUTPATIENT
Start: 2023-07-18 | End: 2023-07-18

## 2023-07-18 RX ORDER — NALOXONE HCL 0.4 MG/ML
0.4 VIAL (ML) INJECTION
Status: DISCONTINUED | OUTPATIENT
Start: 2023-07-18 | End: 2023-07-19 | Stop reason: HOSPADM

## 2023-07-18 RX ORDER — ACETAMINOPHEN 500 MG
1000 TABLET ORAL EVERY 6 HOURS
Status: DISCONTINUED | OUTPATIENT
Start: 2023-07-18 | End: 2023-07-19 | Stop reason: HOSPADM

## 2023-07-18 RX ORDER — PROMETHAZINE HYDROCHLORIDE 25 MG/1
25 TABLET ORAL ONCE AS NEEDED
Status: DISCONTINUED | OUTPATIENT
Start: 2023-07-18 | End: 2023-07-18 | Stop reason: HOSPADM

## 2023-07-18 RX ORDER — DROPERIDOL 2.5 MG/ML
0.62 INJECTION, SOLUTION INTRAMUSCULAR; INTRAVENOUS ONCE AS NEEDED
Status: DISCONTINUED | OUTPATIENT
Start: 2023-07-18 | End: 2023-07-18 | Stop reason: HOSPADM

## 2023-07-18 RX ORDER — CEFAZOLIN SODIUM 2 G/100ML
2000 INJECTION, SOLUTION INTRAVENOUS EVERY 8 HOURS
Status: DISCONTINUED | OUTPATIENT
Start: 2023-07-18 | End: 2023-07-19 | Stop reason: HOSPADM

## 2023-07-18 RX ORDER — ONDANSETRON 2 MG/ML
4 INJECTION INTRAMUSCULAR; INTRAVENOUS EVERY 6 HOURS PRN
Status: DISCONTINUED | OUTPATIENT
Start: 2023-07-18 | End: 2023-07-19 | Stop reason: HOSPADM

## 2023-07-18 RX ORDER — PROMETHAZINE HYDROCHLORIDE 12.5 MG/1
6.25 TABLET ORAL EVERY 6 HOURS PRN
Status: DISCONTINUED | OUTPATIENT
Start: 2023-07-18 | End: 2023-07-19 | Stop reason: HOSPADM

## 2023-07-18 RX ORDER — MAGNESIUM HYDROXIDE 1200 MG/15ML
LIQUID ORAL AS NEEDED
Status: DISCONTINUED | OUTPATIENT
Start: 2023-07-18 | End: 2023-07-18 | Stop reason: HOSPADM

## 2023-07-18 RX ORDER — PROMETHAZINE HYDROCHLORIDE 12.5 MG/1
6.25 SUPPOSITORY RECTAL EVERY 6 HOURS PRN
Status: DISCONTINUED | OUTPATIENT
Start: 2023-07-18 | End: 2023-07-19 | Stop reason: HOSPADM

## 2023-07-18 RX ORDER — FAMOTIDINE 20 MG/1
20 TABLET, FILM COATED ORAL ONCE
Status: COMPLETED | OUTPATIENT
Start: 2023-07-18 | End: 2023-07-18

## 2023-07-18 RX ORDER — MORPHINE SULFATE 2 MG/ML
2 INJECTION, SOLUTION INTRAMUSCULAR; INTRAVENOUS
Status: DISCONTINUED | OUTPATIENT
Start: 2023-07-18 | End: 2023-07-19 | Stop reason: HOSPADM

## 2023-07-18 RX ORDER — SODIUM CHLORIDE 9 MG/ML
50 INJECTION, SOLUTION INTRAVENOUS CONTINUOUS
Status: DISCONTINUED | OUTPATIENT
Start: 2023-07-18 | End: 2023-07-19 | Stop reason: HOSPADM

## 2023-07-18 RX ORDER — LIDOCAINE HYDROCHLORIDE 10 MG/ML
0.5 INJECTION, SOLUTION EPIDURAL; INFILTRATION; INTRACAUDAL; PERINEURAL ONCE AS NEEDED
Status: COMPLETED | OUTPATIENT
Start: 2023-07-18 | End: 2023-07-18

## 2023-07-18 RX ORDER — MEPERIDINE HYDROCHLORIDE 25 MG/ML
12.5 INJECTION INTRAMUSCULAR; INTRAVENOUS; SUBCUTANEOUS
Status: DISCONTINUED | OUTPATIENT
Start: 2023-07-18 | End: 2023-07-18 | Stop reason: HOSPADM

## 2023-07-18 RX ORDER — HYDROCODONE BITARTRATE AND ACETAMINOPHEN 5; 325 MG/1; MG/1
1 TABLET ORAL ONCE AS NEEDED
Status: DISCONTINUED | OUTPATIENT
Start: 2023-07-18 | End: 2023-07-18 | Stop reason: HOSPADM

## 2023-07-18 RX ORDER — NALOXONE HCL 0.4 MG/ML
0.4 VIAL (ML) INJECTION AS NEEDED
Status: DISCONTINUED | OUTPATIENT
Start: 2023-07-18 | End: 2023-07-18 | Stop reason: HOSPADM

## 2023-07-18 RX ORDER — FENTANYL CITRATE 50 UG/ML
INJECTION, SOLUTION INTRAMUSCULAR; INTRAVENOUS
Status: COMPLETED
Start: 2023-07-18 | End: 2023-07-18

## 2023-07-18 RX ORDER — SODIUM CHLORIDE 0.9 % (FLUSH) 0.9 %
3-10 SYRINGE (ML) INJECTION AS NEEDED
Status: DISCONTINUED | OUTPATIENT
Start: 2023-07-18 | End: 2023-07-18 | Stop reason: HOSPADM

## 2023-07-18 RX ORDER — FENTANYL CITRATE 50 UG/ML
50 INJECTION, SOLUTION INTRAMUSCULAR; INTRAVENOUS
Status: DISCONTINUED | OUTPATIENT
Start: 2023-07-18 | End: 2023-07-18 | Stop reason: HOSPADM

## 2023-07-18 RX ORDER — MIDAZOLAM HYDROCHLORIDE 1 MG/ML
1 INJECTION INTRAMUSCULAR; INTRAVENOUS
Status: DISCONTINUED | OUTPATIENT
Start: 2023-07-18 | End: 2023-07-18 | Stop reason: HOSPADM

## 2023-07-18 RX ORDER — SODIUM CHLORIDE 0.9 % (FLUSH) 0.9 %
10 SYRINGE (ML) INJECTION AS NEEDED
Status: DISCONTINUED | OUTPATIENT
Start: 2023-07-18 | End: 2023-07-18 | Stop reason: HOSPADM

## 2023-07-18 RX ORDER — OXYCODONE HYDROCHLORIDE 5 MG/1
5 TABLET ORAL EVERY 4 HOURS PRN
Status: DISCONTINUED | OUTPATIENT
Start: 2023-07-18 | End: 2023-07-19 | Stop reason: HOSPADM

## 2023-07-18 RX ORDER — ALBUTEROL SULFATE 90 UG/1
2 AEROSOL, METERED RESPIRATORY (INHALATION) EVERY 4 HOURS PRN
Status: DISCONTINUED | OUTPATIENT
Start: 2023-07-18 | End: 2023-07-18

## 2023-07-18 RX ORDER — HYDRALAZINE HYDROCHLORIDE 20 MG/ML
5 INJECTION INTRAMUSCULAR; INTRAVENOUS
Status: DISCONTINUED | OUTPATIENT
Start: 2023-07-18 | End: 2023-07-18 | Stop reason: HOSPADM

## 2023-07-18 RX ORDER — PROMETHAZINE HYDROCHLORIDE 25 MG/1
25 SUPPOSITORY RECTAL ONCE AS NEEDED
Status: DISCONTINUED | OUTPATIENT
Start: 2023-07-18 | End: 2023-07-18 | Stop reason: HOSPADM

## 2023-07-18 RX ORDER — ALBUTEROL SULFATE 2.5 MG/3ML
2.5 SOLUTION RESPIRATORY (INHALATION) EVERY 6 HOURS PRN
Status: DISCONTINUED | OUTPATIENT
Start: 2023-07-18 | End: 2023-07-19 | Stop reason: HOSPADM

## 2023-07-18 RX ORDER — SODIUM CHLORIDE, SODIUM LACTATE, POTASSIUM CHLORIDE, CALCIUM CHLORIDE 600; 310; 30; 20 MG/100ML; MG/100ML; MG/100ML; MG/100ML
9 INJECTION, SOLUTION INTRAVENOUS CONTINUOUS
Status: DISCONTINUED | OUTPATIENT
Start: 2023-07-18 | End: 2023-07-19 | Stop reason: HOSPADM

## 2023-07-18 RX ORDER — SODIUM CHLORIDE 0.9 % (FLUSH) 0.9 %
10 SYRINGE (ML) INJECTION EVERY 12 HOURS SCHEDULED
Status: DISCONTINUED | OUTPATIENT
Start: 2023-07-18 | End: 2023-07-18 | Stop reason: HOSPADM

## 2023-07-18 RX ORDER — FAMOTIDINE 10 MG/ML
20 INJECTION, SOLUTION INTRAVENOUS ONCE
Status: DISCONTINUED | OUTPATIENT
Start: 2023-07-18 | End: 2023-07-18

## 2023-07-18 RX ORDER — LABETALOL HYDROCHLORIDE 5 MG/ML
5 INJECTION, SOLUTION INTRAVENOUS
Status: DISCONTINUED | OUTPATIENT
Start: 2023-07-18 | End: 2023-07-18 | Stop reason: HOSPADM

## 2023-07-18 RX ORDER — OXYCODONE HYDROCHLORIDE 10 MG/1
10 TABLET ORAL EVERY 4 HOURS PRN
Status: DISCONTINUED | OUTPATIENT
Start: 2023-07-18 | End: 2023-07-19 | Stop reason: HOSPADM

## 2023-07-18 RX ORDER — OXYCODONE HYDROCHLORIDE 5 MG/1
5 TABLET ORAL EVERY 4 HOURS PRN
Status: DISCONTINUED | OUTPATIENT
Start: 2023-07-18 | End: 2023-07-18

## 2023-07-18 RX ORDER — LEVOTHYROXINE SODIUM 0.05 MG/1
50 TABLET ORAL
Status: DISCONTINUED | OUTPATIENT
Start: 2023-07-19 | End: 2023-07-19 | Stop reason: HOSPADM

## 2023-07-18 RX ORDER — SODIUM CHLORIDE 9 MG/ML
40 INJECTION, SOLUTION INTRAVENOUS AS NEEDED
Status: DISCONTINUED | OUTPATIENT
Start: 2023-07-18 | End: 2023-07-18 | Stop reason: HOSPADM

## 2023-07-18 RX ADMIN — LIDOCAINE HYDROCHLORIDE 0.5 ML: 10 INJECTION, SOLUTION EPIDURAL; INFILTRATION; INTRACAUDAL; PERINEURAL at 07:04

## 2023-07-18 RX ADMIN — FAMOTIDINE 20 MG: 20 TABLET, FILM COATED ORAL at 07:05

## 2023-07-18 RX ADMIN — FENTANYL CITRATE 50 MCG: 50 INJECTION, SOLUTION INTRAMUSCULAR; INTRAVENOUS at 12:09

## 2023-07-18 RX ADMIN — ACETAMINOPHEN 1000 MG: 500 TABLET ORAL at 15:07

## 2023-07-18 RX ADMIN — Medication 2000 MG: at 19:58

## 2023-07-18 RX ADMIN — OXYCODONE HYDROCHLORIDE 10 MG: 10 TABLET ORAL at 19:57

## 2023-07-18 RX ADMIN — SODIUM CHLORIDE, POTASSIUM CHLORIDE, SODIUM LACTATE AND CALCIUM CHLORIDE 9 ML/HR: 600; 310; 30; 20 INJECTION, SOLUTION INTRAVENOUS at 07:04

## 2023-07-18 RX ADMIN — OXYCODONE HYDROCHLORIDE 5 MG: 5 TABLET ORAL at 15:07

## 2023-07-18 RX ADMIN — ROSUVASTATIN CALCIUM 10 MG: 10 TABLET, COATED ORAL at 20:46

## 2023-07-18 RX ADMIN — HYDROMORPHONE HYDROCHLORIDE 0.5 MG: 1 INJECTION, SOLUTION INTRAMUSCULAR; INTRAVENOUS; SUBCUTANEOUS at 13:16

## 2023-07-18 RX ADMIN — HYDROMORPHONE HYDROCHLORIDE 0.5 MG: 1 INJECTION, SOLUTION INTRAMUSCULAR; INTRAVENOUS; SUBCUTANEOUS at 12:58

## 2023-07-18 RX ADMIN — DIAZEPAM 5 MG: 5 TABLET ORAL at 13:33

## 2023-07-18 RX ADMIN — ACETAMINOPHEN 1000 MG: 500 TABLET ORAL at 20:46

## 2023-07-18 RX ADMIN — SODIUM CHLORIDE 50 ML/HR: 9 INJECTION, SOLUTION INTRAVENOUS at 15:08

## 2023-07-18 NOTE — BRIEF OP NOTE
LOWER BODY LIFT  Progress Note    Maddi Betancur  7/18/2023    Pre-op Diagnosis:   Encounter for cosmetic surgery  Excess skin after massive weight loss       Post-Op Diagnosis Codes:   Same     Procedure/CPT® Codes:        Procedure(s):  LOWER BODY LIFT              Surgeon(s):  Bruno Chris MD Moore, Evan M, MD    Anesthesia: General    Staff:   Circulator: Fan Carlson RN  Scrub Person: Vivi Cody RN  Assistant: Rosa Childers PA  Assistant: Rosa Childers PA      Estimated Blood Loss: 100 mL    Urine Voided: 190 mL    Specimens:                None          Drains:   Closed/Suction Drain 1 Inferior;Lateral;Left Back 15 Fr. (Active)       Closed/Suction Drain 2 Inferior;Lateral;Right Back Bulb 15 Fr. (Active)       Closed/Suction Drain 3 Lateral LLQ Bulb 15 Fr. (Active)       Urethral Catheter Non-latex;Straight-tip;Silicone;Double-lumen 16 Fr. (Active)       Findings: see op note        Complications: none immediate     Assistant: Rosa Childers PA  was responsible for performing the following activities: Suturing and their skilled assistance was necessary for the success of this case.    Bruno Chris MD     Date: 7/18/2023  Time: 12:01 EDT

## 2023-07-18 NOTE — OP NOTE
DATE OF PROCEDURE: 07/18/23    PREOPERATIVE DIAGNOSIS:   1. Encounter for cosmetic surgery  2. Excess abdominal skin and back skin.   3. Hypertrophic scars on bilateral breasts     POSTOPERATIVE DIAGNOSIS:   1. Encounter for cosmetic surgery  2. Excess abdominal skin and back skin.  3. Hypertrophic scars on bilateral breasts     PROCEDURES PERFORMED:  1. Lower body lift   2. Injection with kenalog to hypertrophic scars on bilateral breasts      SURGEON: Bruno Chris MD; Addison Vallejo MD     ASSISTANT: Assistant: Rosa Childers PA   Circulator: Fan Carlson RN  Scrub Person: Vivi Cody RN  Assistant: Rosa Childers PA     Assistant: Rosa Childers PA  was responsible for performing the following activities: Suturing and their skilled assistance was necessary for the success of this case.    ANESTHESIA: General.    INDICATIONS: The patient is a 57-year-old female who presented to my office with complaint of breast ptosis and excess abdominal skin and fat after massive weight loss after gastric sleeve. She desired body contouring and the aforementioned procedures. The patient understood all of the risks and benefits of the procedure, including infection, need for future surgeries and elected to proceed. She also a previous breast reduction by another surgeon with resulting hypertrophic scars. She desired steroid injections today to flatten the scars.      FINDINGS:  1. Removal of 1.7 pounds from abdomen  2. Removal of 1.9 pounds from the back.      DESCRIPTION OF PROCEDURE: The patient was seen in preoperative holding and then marked in a standing position and taken to the operating room by anesthesia after informed consent was signed and on the chart. The patient was placed supine on the operating room table and general anesthesia was induced. Once verified, the case was then turned over to the surgical service. TAP blocks were performed. She was then placed prone for the back excision of the lower  body lift. After prepping and draping we began excising a large section of back skin after confirming our marks with tailor tacking. When then closed the skin over 2 TAMIKO drains with a 2-0 Vicryl, Insorb dermal stapler and 3-0 Strattafix. Prineo was placed over the incision. We then rotated back supine and reprepped and draped.      I then moved to the abdominoplasty portion of the case. I then after making the lower abdominal incision with a 10 blade scalpel we disected up to the xiphoid process after freeing up her umbilicus. I then performed the plication portion of the case with tightening of her rectus diastasis. I used a large looped 0 nylon in a running fashion both above and below the umbilicus.  I then flexed the table at her waist and performed progressive tension sutures with a 2-0 vicryl in multiple different rows. We then excised a large section of her abdominal skin. Bovie cautery was used for bleeding. We closed the incision after placing 1 15 Kyr[place] Joseph drain. The incision was closed first with 2-0 vicryl in Andrew's fascia. Then 3-0 monocryl and  A 3-0 Stratafix suture in a running fashion. The umbilicus was brought out through a new position on her abdominal skin and sutured in to place with 4-0 Monocryl and a 5-0 Monocyrl. Skin afix skin glue was placed on all incisions.  She also had an abdominal binder placed as well.      I then diluted 40mg kenalog with saline in a 1:2 ratio and injected her Hudson pattern breast reduction scars on bilateral breasts that were hypertrophic.     She was awakened, extubated and taken to PACU in stable condition. All sponge and instrument counts were correct. I, Dr. Bruno Chris, was present and scrubbed for the entire procedure.      SPECIMENS: Breast tissue.         ESTIMATED BLOOD LOSS: 50cc.     DRAINS: JPx3.     COMPLICATIONS: None immediate.       Bruno Chris MD  07/18/23  12:01 EDT

## 2023-07-18 NOTE — H&P
Pre-Op H&P  Maddi Betancur  [id number]  1965    Chief complaint: encounter for cosmetic surgery    HPI:    Patient is a 57 y.o.female who presents for an encounter for cosmetic surgery. She is scheduled for a lower body lift. She does not take blood thinners routinely. She denies any significant cosmetic history. She mentions a weight loss of 85 lbs over the past several years.     Review of Systems:  General ROS: negative for chills or fevers  Cardiovascular ROS: no chest pain or dyspnea on exertion  Respiratory ROS: no cough, shortness of breath, or wheezing    Allergies:   Allergies   Allergen Reactions    Prolia [Denosumab] Other (See Comments)     Severe joint pain    Worth Rash       Home Meds:    No current facility-administered medications on file prior to encounter.     Current Outpatient Medications on File Prior to Encounter   Medication Sig Dispense Refill    B Complex Vitamins (VITAMIN B COMPLEX PO) Take 1 tablet by mouth Daily.      buPROPion XL (WELLBUTRIN XL) 300 MG 24 hr tablet Take 1 tablet by mouth Every Morning. 90 tablet 3    Calcium Carbonate-Vitamin D (TGT CALCIUM DIETARY SUPPLEMENT PO) Take 600 mg by mouth Daily.      fluticasone (FLONASE) 50 MCG/ACT nasal spray 2 sprays into the nostril(s) as directed by provider Daily. Uses prn      Multiple Vitamins-Minerals (AIRBORNE PO) Take 1 tablet by mouth Daily.      raloxifene (EVISTA) 60 MG tablet TAKE 1 TABLET DAILY 90 tablet 3    rosuvastatin (CRESTOR) 10 MG tablet Take 1 tablet by mouth Every Night. To lower cholesterol and risk of stroke. 90 tablet 3    Synthroid 50 MCG tablet TAKE 1 TABLET DAILY ON AN  EMPTY STOMACH AND WAIT 30  MINUTES BEFORE EATING OR   TAKING OTHER MEDICATIONS 90 tablet 0    albuterol sulfate  (90 Base) MCG/ACT inhaler Inhale 2 puffs Every 4 (Four) Hours As Needed for Wheezing or Shortness of Air. 8 g 5    betamethasone valerate (VALISONE) 0.1 % cream Apply 1 application  topically to the appropriate area  as directed Daily. As needed for dry patchy areas      EPIPEN 2-DONNA 0.3 MG/0.3ML solution auto-injector injection Inject 0.3 mg into the appropriate muscle as directed by prescriber 1 (One) Time. As needed  0       PMH:   Past Medical History:   Diagnosis Date    Acid reflux     Acquired hypothyroidism 02/06/2021    Anemia     Arrhythmia     EXTRA HEART BEAT    Asthma     does not use inhaler and has weight loss    Atypical lobular hyperplasia (ALH) of breast     BMI 34.0-34.9,adult     Cervical cancer     stage 1 or less in early 20's (no chemo or radiation). All pap smears negative since    Chicken pox     6 weeks of age    Depression     Elevated cholesterol     Hyperlipidemia 2018    Keloid scar of skin 11/2019    Measles     9 weeks of age    Osteopenia 2021    Right kidney mass      PSH:    Past Surgical History:   Procedure Laterality Date    ANKLE LIGAMENT RECONSTRUCTION Bilateral 04/21/2022    10/2022    BILATERAL BREAST REDUCTION  11/04/2019    BUNIONECTOMY Left 11/26/2018    BUNIONECTOMY Right 2020    CLOSED MANIPULATION SHOULDER Right     COLONOSCOPY N/A 04/02/2021    Procedure: COLONOSCOPY with cold snare and cold biopsy polypectomies;  Surgeon: Tasha Vallejo MD;  Location: [facility] ENDOSCOPY;  Service: Gastroenterology;  Laterality: N/A;    DIAGNOSTIC LAPAROSCOPY      ENDOSCOPY N/A 08/06/2021    Procedure: ESOPHAGOGASTRODUODENOSCOPY WITH BIOPSY and polypectomy;  Surgeon: Tasha Vallejo MD;  Location: [facility] ENDOSCOPY;  Service: Gastroenterology;  Laterality: N/A;    EXCISION URETHRAL DIVERTICULUM FEMALE  08/04/2021    Repair    EXPLORATORY LAPAROTOMY      GANGLION CYST EXCISION      GYNECOLOGIC CRYOSURGERY      for cervical CA    HYSTERECTOMY      REDUCTION MAMMAPLASTY      RHINOPLASTY         Immunization History:  Influenza: UTD  Pneumococcal: UTD  Tetanus: UTD    Social History:   Tobacco:   Social History     Tobacco Use   Smoking Status Former    Packs/day: 1.00    Years: 15.00    Pack years:  "15.00    Types: Cigarettes    Quit date: 10/1/1998    Years since quittin.8   Smokeless Tobacco Never   Tobacco Comments    quit more than 20 years ago      Alcohol:     Social History     Substance and Sexual Activity   Alcohol Use No       Vitals:           /84 (BP Location: Right arm, Patient Position: Lying)   Pulse 68   Temp 97.2 °F (36.2 °C) (Temporal)   Resp 18   Ht 158.1 cm (62.25\")   Wt 57.2 kg (126 lb)   SpO2 96%   BMI 22.86 kg/m²     Physical Exam:  General Appearance:    Alert, cooperative, no distress, appears stated age   Head:    Normocephalic, without obvious abnormality, atraumatic   Lungs:     Clear to auscultation bilaterally, respirations unlabored    Heart:   Regular rate and rhythm, no murmur, rub or gallop    Abdomen:    Soft, nontender. No rigidity or guarding.    Breast Exam:    deferred   Genitalia:    deferred   Extremities:   No cyanosis or edema   Skin:   Skin color, texture, turgor normal, no rashes or lesions   Neurologic:   Grossly intact   Results Review  LABS:  Lab Results   Component Value Date    WBC 3.83 2023    HGB 12.9 2023    HCT 40.2 2023    MCV 94.8 2023     2023    NEUTROABS 2.44 2020    GLUCOSE 92 2023    BUN 12 2023    CREATININE 0.58 2023    EGFRIFNONA 84 2021    EGFRIFAFRI 102 2021     2023    K 4.2 2023     (H) 2023    CO2 26.0 2023    MG 2.1 2022    CALCIUM 9.4 2023    ALBUMIN 4.4 05/15/2023    AST 18 05/15/2023    ALT 12 05/15/2023    BILITOT 0.3 05/15/2023   A1c: 4.70%    RADIOLOGY:  No radiology results for the last 3 days     I reviewed the patient's new imaging results and agree with the interpretation.    Impression: encounter for cosmetic surgery    Plan: lower body lift and inject bilateral breast hypertrophic scars with kenalog.     Tian Bates PA-C   2023   06:47 EDT   "

## 2023-07-19 VITALS
RESPIRATION RATE: 18 BRPM | OXYGEN SATURATION: 94 % | HEART RATE: 71 BPM | WEIGHT: 126 LBS | SYSTOLIC BLOOD PRESSURE: 112 MMHG | HEIGHT: 62 IN | DIASTOLIC BLOOD PRESSURE: 70 MMHG | BODY MASS INDEX: 23.19 KG/M2 | TEMPERATURE: 99.5 F

## 2023-07-19 PROCEDURE — 25010000002 ONDANSETRON PER 1 MG: Performed by: PLASTIC SURGERY

## 2023-07-19 RX ADMIN — BUPROPION HYDROCHLORIDE 300 MG: 150 TABLET, EXTENDED RELEASE ORAL at 08:07

## 2023-07-19 RX ADMIN — ACETAMINOPHEN 1000 MG: 500 TABLET ORAL at 08:07

## 2023-07-19 RX ADMIN — ACETAMINOPHEN 1000 MG: 500 TABLET ORAL at 02:29

## 2023-07-19 RX ADMIN — Medication 2000 MG: at 09:08

## 2023-07-19 RX ADMIN — Medication 2000 MG: at 02:29

## 2023-07-19 RX ADMIN — OXYCODONE HYDROCHLORIDE 5 MG: 5 TABLET ORAL at 11:43

## 2023-07-19 RX ADMIN — LEVOTHYROXINE SODIUM 50 MCG: 0.05 TABLET ORAL at 06:18

## 2023-07-19 RX ADMIN — OXYCODONE HYDROCHLORIDE 10 MG: 10 TABLET ORAL at 04:00

## 2023-07-19 RX ADMIN — ONDANSETRON 4 MG: 2 INJECTION INTRAMUSCULAR; INTRAVENOUS at 09:48

## 2023-07-19 NOTE — PLAN OF CARE
Goal Outcome Evaluation:  Plan of Care Reviewed With: patient        Progress: improving  Outcome Evaluation: VSS. Pain controlled with oral pain meds. IVF and IV abx given. UOP-WNL. Pt slept well. Will continue to monitor. Anticipate d/c later today.

## 2023-07-19 NOTE — CASE MANAGEMENT/SOCIAL WORK
Case Management Discharge Note      Final Note: 'er met with patient and  at beside. Patient is actively discharging. No discharge needs. Plan is home with  transporting.         Selected Continued Care - Admitted Since 7/18/2023       Destination    No services have been selected for the patient.                Durable Medical Equipment    No services have been selected for the patient.                Dialysis/Infusion    No services have been selected for the patient.                Home Medical Care    No services have been selected for the patient.                Therapy    No services have been selected for the patient.                Community Resources    No services have been selected for the patient.                Community & DME    No services have been selected for the patient.                         Final Discharge Disposition Code: 01 - home or self-care

## 2023-07-19 NOTE — PROGRESS NOTES
LOS: 0 days   Patient Care Team:  Desirae Harris MD as PCP - General (Internal Medicine)  Ivett He APRN as Nurse Practitioner (Gynecologic Oncology)    Chief Complaint:  doing well. Pain controlled.     Subjective     Interval History:     Patient Complaints: none  Patient Denies:  significant pain   History taken from: patient        Objective     Vital Signs  Temp:  [97.2 °F (36.2 °C)-99.2 °F (37.3 °C)] 99.1 °F (37.3 °C)  Heart Rate:  [61-87] 68  Resp:  [9-16] 16  BP: ()/(53-82) 113/58    Physical Exam:  GEN: NAD, AAOx3   ABD: soft, flat.  skin flaps pink, viable. No hematomas. Incisions c/d/i. TAMIKO drains in place with serosanginous drainage.   Back: flat, no hematomas.      Results Review:     I reviewed the patient's new clinical results.  Lab Results   Component Value Date    WBC 3.83 07/11/2023    HGB 12.9 07/11/2023    HCT 40.2 07/11/2023    MCV 94.8 07/11/2023     07/11/2023     Lab Results   Component Value Date    GLUCOSE 92 07/11/2023    CALCIUM 9.4 07/11/2023     07/11/2023    K 4.2 07/11/2023    CO2 26.0 07/11/2023     (H) 07/11/2023    BUN 12 07/11/2023    CREATININE 0.58 07/11/2023    EGFRIFAFRI 102 02/02/2021    EGFRIFNONA 84 02/02/2021    BCR 20.7 07/11/2023    ANIONGAP 9.0 07/11/2023   No results found for: PTTNo results found for: INR, PROTIME      Assessment & Plan       S/P cosmetic plastic surgery      1. D/c home  2. F/u in 1 week in office  3. Leave dressing on until tomorrow. Can then take off and shower and back into binder and ABD pads  4. Drain care  5. Scripts already filled   6. No lifting  7. Arixtra started today for 5 days  8. Must walk and sleep bent at waist     Plan for disposition: home    Bruno Chris MD  07/19/23  07:12 EDT

## 2023-07-19 NOTE — PLAN OF CARE
Goal Outcome Evaluation:  Plan of Care Reviewed With: patient        Progress: improving          Tolerating diet. Ambulation to bathroom w/ x1 assist and bend at waist. IVF infusing. Adequate UOP after roy removal. Prns given for pain. Demetrio MOSCOSO for better pain control meds. IS given . Encouraged ambulation during night. [place] x3 w/ bloody - serosang drainage. Awaiting further orders.

## 2023-09-27 ENCOUNTER — OFFICE VISIT (OUTPATIENT)
Dept: ONCOLOGY | Facility: CLINIC | Age: 58
End: 2023-09-27
Payer: COMMERCIAL

## 2023-09-27 VITALS
SYSTOLIC BLOOD PRESSURE: 114 MMHG | HEART RATE: 76 BPM | OXYGEN SATURATION: 99 % | TEMPERATURE: 98.2 F | DIASTOLIC BLOOD PRESSURE: 54 MMHG

## 2023-09-27 DIAGNOSIS — N60.92 ATYPICAL LOBULAR HYPERPLASIA (ALH) OF LEFT BREAST: Chronic | ICD-10-CM

## 2023-09-27 DIAGNOSIS — Z91.89 AT HIGH RISK FOR BREAST CANCER: Primary | ICD-10-CM

## 2023-09-27 NOTE — PROGRESS NOTES
GYN ONCOLOGY HIGH RISK CLINIC    Maddi Betancur  [id number]  1965    Subjective   Chief Complaint: Follow-up (High risk breast/ALH)      HISTORY OF PRESENT ILLNESS:       Maddi Betancur is a 58 y.o. year old female here today for her high risk visit. Patient underwent bilateral breast reduction in 11/2019 after a significant weight loss through diet and exercise. Pathology from her reduction revealed atypical lobular hyperplasia (ALH). She has had negative genetic testing and found to have a lifetime risk of breast cancer up to 30.7% by CARLI/Stephanie. Since our last visit she has completed plastic surgery with Dr. Chris for removal of excess abdominal skin following weight loss. He is also doing injections to soften her keloid breast scars. She has been very pleased with her care and outcomes.     Patient performs regular home self breast exams, denies any new or concerning findings. She is on Evista for chemoprevention (started in 8/2020), doing well on medication. All breast imaging currently up-to-date, due to repeat MRI this fall.     The current medication list and allergy list were reviewed and reconciled.     Past Medical History, Past Surgical History, Social History, Family History have been reviewed and are without significant changes except as mentioned.      Health Maintenance:    Regular self breast exam: yes  Mammogram: screening 5/15/2023, diagnostic left + u/s 5/22/2023 negative. History of abnormal mammogram: no (ALH incidentally found upon path from reduction mammaplasty)  Breast MRI: 11/29/2022. Results: negative    Risk Asessment: Carli/Stephanie: 30.7%, h/o ALH    Review of Systems   Constitutional: Negative.    Gastrointestinal: Negative.    Genitourinary: Negative.    Psychiatric/Behavioral: Negative.           Objective   Physical Exam  Vital Signs: /54   Pulse 76   Temp 98.2 °F (36.8 °C) (Temporal)   SpO2 99%    Wt Readings from Last 3 Encounters:   07/18/23 0642 57.2  kg (126 lb)   07/11/23 0949 57.5 kg (126 lb 12.2 oz)   05/27/23 1008 56.2 kg (124 lb)       Vitals:    09/27/23 1006   PainSc: 0-No pain             General Appearance:  alert, cooperative, no apparent distress, appears stated age and normal weight   Neurologic/Psych: A&O x 3, gait steady, appropriate affect   Breasts:  Symmetrical, no masses, no lesions, no nipple discharge and scarring consistent with bilateral breast reduction.  Bilateral lower horizontal scars keloid, improved since last visit. No tenderness noted today.    Abdomen:   Soft, non-tender, non-distended and no organomegaly. Scarring consistent with surgical history, well healed.    Lymph nodes: No axillary adenopathy noted   Skin: No rashes, ulcers, or suspicious lesions noted              Assessment and Plan:    Diagnoses and all orders for this visit:    1. At high risk for breast cancer (Primary)  -     MRI Breast Bilateral Screening With & Without Contrast; Future    2. Atypical lobular hyperplasia (ALH) of left breast        Patient Education:  Reviewed screening schedule related to diagnosis as follows:  Monthly breast self-exam starting at age 18.  Clinical breast exam every 6 months.  Annual mammogram starting at age 40, or 10 years prior to the earliest diagnosis in the family.   Annual MRI starting at age 40, or 10 years prior to the earliest diagnosis in the family.   Consider chemoprevention for breast cancer risk reduction (tamoxifen/evista).     Discussed the purpose of high risk clinic in coordinating, scheduling, and planning screening interventions to include yearly mammograms, yearly screening breast MRI, Q6 month CBE, and monthly SBE. Colonoscopy and GYN screening per national guidelines.   Patient desires to move her annual exams to our office, due next in 3/2023. She does have a history of abnormal pap smears, s/p cryo and hysterectomy, will need periodic pap smears of the vaginal cuff. Colonoscopy UTD.   Reviewed technique for  SBE including concerning findings to report.  Continue every 6 month clinical breast exams. WNL today, repeat with annual in 6 months.   Discussed yearly screening mammogram. Results will be reported by breast imaging center and a copy of report will be sent to our office for review. Screening mammogram next in 5/2024.   Discussed screening breast MRI scheduling based on menstrual cycles, HRT. We also discussed the increased sensitivity of MRI screening and possibility of call- backs for additional imaging or biopsies to establish baseline. Repeat MRI due in late 11/2023, order placed today.   Discussed vitamin D as benefit to bone health and possible benefit to breast health. Recommended 1000 IU daily unless contraindicated  Discussed benefit of Vitamin E for fibrocystic breasts/breast pain  Discussed tamoxifen/evista use for chemoprevention including risk and benefits of use as well as side effects of medication. Patient initiated prescription Evista in 8/2020 and is happy with medication thus far, no side effects of note or complications, will continue.       Pain assessment was performed today as a part of patient’s care. For patients with pain related to surgery, gynecologic malignancy or cancer treatment, the plan is as noted in the assessment/plan.  For patients with pain not related to these issues, they are to seek any further needed care from a more appropriate provider, such as PCP.        Return to clinic in 6 months for Annual Exam and High Risk visit.      Electronically signed by ALLAN Pelayo on 09/27/23 at 10:23 EDT

## 2023-11-30 ENCOUNTER — HOSPITAL ENCOUNTER (OUTPATIENT)
Dept: MRI IMAGING | Facility: HOSPITAL | Age: 58
Discharge: HOME | End: 2023-11-30
Admitting: NURSE PRACTITIONER
Payer: COMMERCIAL

## 2023-11-30 DIAGNOSIS — Z91.89 AT HIGH RISK FOR BREAST CANCER: ICD-10-CM

## 2023-11-30 PROCEDURE — A9577 INJ MULTIHANCE: HCPCS | Performed by: NURSE PRACTITIONER

## 2023-11-30 PROCEDURE — 0 GADOBENATE DIMEGLUMINE 529 MG/ML SOLUTION: Performed by: NURSE PRACTITIONER

## 2023-11-30 PROCEDURE — 77049 MRI BREAST C-+ W/CAD BI: CPT

## 2023-11-30 RX ADMIN — GADOBENATE DIMEGLUMINE 11 ML: 529 INJECTION, SOLUTION INTRAVENOUS at 08:49

## 2023-12-06 ENCOUNTER — TELEPHONE (OUTPATIENT)
Dept: MRI IMAGING | Facility: HOSPITAL | Age: 58
End: 2023-12-06
Payer: COMMERCIAL

## 2024-03-22 ENCOUNTER — TELEPHONE (OUTPATIENT)
Dept: SURGERY | Facility: CLINIC | Age: 59
End: 2024-03-22
Payer: COMMERCIAL

## 2024-03-22 NOTE — TELEPHONE ENCOUNTER
Patient ready to schedule 3 year colonoscopy recall.  Last colonoscopy 4/2/2021.  Verified patient information.

## 2024-03-26 RX ORDER — BISACODYL 5 MG/1
TABLET, DELAYED RELEASE ORAL
Qty: 4 TABLET | Refills: 0 | Status: SHIPPED | OUTPATIENT
Start: 2024-03-26

## 2024-03-26 RX ORDER — POLYETHYLENE GLYCOL 3350 17 G/17G
238 POWDER, FOR SOLUTION ORAL DAILY
Qty: 238 G | Refills: 0 | Status: SHIPPED | OUTPATIENT
Start: 2024-03-26 | End: 2024-03-27

## 2024-03-26 NOTE — TELEPHONE ENCOUNTER
PRESCREENING FOR OPEN ACCESS SCHEDULING    Maddi Betancur, 1965  [id number]    03/26/24    If, the patient answers yes to any of the following questions the provider will be informed prior to scheduling open access for approval and documented in the chart.    [x]  Yes  [] No    1. Have you ever had a colonoscopy in the past?      When:        Where:       Polyps or other:     [x]  Yes  [] No    2. Family history of colon cancer?      Relation: maternal cousin      Age of onset:       Do you currently have any of the following?    []  Yes  [x] No  Rectal bleeding, if so, how long?     []  Yes  [x] No  Abdominal pain, if so, how long?    [x]  Yes  [] No  Constipation, if so, how long? Years     []  Yes  [x] No  Diarrhea, if so, how long?    []  Yes  [x] No  Weight loss, is so, how much?    [] Yes  [x] No  Small caliber stool, if so, how long?      Have you ever had any of the following conditions?    [] Yes  [x] No  Heart attack?      When?       Last cardiac workup?     Blood thinners?    [x] Yes  [] No   Lung problems, asthma or COPD? Asthma  [] Yes  [x] No  Oxygen required?       [] Yes  [x] No  Stroke?     [] Yes  [x] No  Have you ever had a reaction to anesthesia?

## 2024-03-26 NOTE — TELEPHONE ENCOUNTER
Pt would like to be scheduled at [facility] on 05/06 w/ Dr. Vallejo. Verified pharmacy/insurance. Thank you.

## 2024-04-02 ENCOUNTER — TRANSCRIBE ORDERS (OUTPATIENT)
Dept: ADMINISTRATIVE | Facility: HOSPITAL | Age: 59
End: 2024-04-02
Payer: COMMERCIAL

## 2024-04-02 DIAGNOSIS — Z12.31 SCREENING MAMMOGRAM FOR BREAST CANCER: Primary | ICD-10-CM

## 2024-04-09 ENCOUNTER — OFFICE VISIT (OUTPATIENT)
Dept: GYNECOLOGIC ONCOLOGY | Facility: CLINIC | Age: 59
End: 2024-04-09
Payer: COMMERCIAL

## 2024-04-09 ENCOUNTER — LAB (OUTPATIENT)
Dept: LAB | Facility: HOSPITAL | Age: 59
End: 2024-04-09
Payer: COMMERCIAL

## 2024-04-09 VITALS
BODY MASS INDEX: 23.28 KG/M2 | DIASTOLIC BLOOD PRESSURE: 55 MMHG | HEIGHT: 62 IN | WEIGHT: 126.5 LBS | RESPIRATION RATE: 18 BRPM | HEART RATE: 76 BPM | SYSTOLIC BLOOD PRESSURE: 124 MMHG | TEMPERATURE: 97.4 F | OXYGEN SATURATION: 94 %

## 2024-04-09 DIAGNOSIS — R23.2 HOT FLASHES: ICD-10-CM

## 2024-04-09 DIAGNOSIS — Z12.39 ENCOUNTER FOR BREAST CANCER SCREENING USING NON-MAMMOGRAM MODALITY: ICD-10-CM

## 2024-04-09 DIAGNOSIS — Z91.89 AT HIGH RISK FOR BREAST CANCER: ICD-10-CM

## 2024-04-09 DIAGNOSIS — Z86.39 HISTORY OF IRON DEFICIENCY: ICD-10-CM

## 2024-04-09 DIAGNOSIS — E03.9 ACQUIRED HYPOTHYROIDISM: Chronic | ICD-10-CM

## 2024-04-09 DIAGNOSIS — Z85.41 HISTORY OF CERVICAL CANCER: ICD-10-CM

## 2024-04-09 DIAGNOSIS — N60.92 ATYPICAL LOBULAR HYPERPLASIA (ALH) OF LEFT BREAST: Primary | Chronic | ICD-10-CM

## 2024-04-09 DIAGNOSIS — N60.92 ATYPICAL LOBULAR HYPERPLASIA (ALH) OF LEFT BREAST: Chronic | ICD-10-CM

## 2024-04-09 DIAGNOSIS — R63.5 UNEXPLAINED WEIGHT GAIN: ICD-10-CM

## 2024-04-09 DIAGNOSIS — Z01.419 ENCOUNTER FOR ANNUAL ROUTINE GYNECOLOGICAL EXAMINATION: Primary | ICD-10-CM

## 2024-04-09 DIAGNOSIS — Z01.419 ENCOUNTER FOR ANNUAL ROUTINE GYNECOLOGICAL EXAMINATION: ICD-10-CM

## 2024-04-09 LAB
ALBUMIN SERPL-MCNC: 4.4 G/DL (ref 3.5–5.2)
ALBUMIN/GLOB SERPL: 1.8 G/DL
ALP SERPL-CCNC: 79 U/L (ref 39–117)
ALT SERPL W P-5'-P-CCNC: 12 U/L (ref 1–33)
ANION GAP SERPL CALCULATED.3IONS-SCNC: 7 MMOL/L (ref 5–15)
AST SERPL-CCNC: 18 U/L (ref 1–32)
BASOPHILS # BLD AUTO: 0.03 10*3/MM3 (ref 0–0.2)
BASOPHILS NFR BLD AUTO: 0.8 % (ref 0–1.5)
BILIRUB SERPL-MCNC: 0.4 MG/DL (ref 0–1.2)
BUN SERPL-MCNC: 11 MG/DL (ref 6–20)
BUN/CREAT SERPL: 16.4 (ref 7–25)
CALCIUM SPEC-SCNC: 9.2 MG/DL (ref 8.6–10.5)
CHLORIDE SERPL-SCNC: 106 MMOL/L (ref 98–107)
CHOLEST SERPL-MCNC: 160 MG/DL (ref 0–200)
CO2 SERPL-SCNC: 28 MMOL/L (ref 22–29)
CREAT SERPL-MCNC: 0.67 MG/DL (ref 0.57–1)
DEPRECATED RDW RBC AUTO: 40.7 FL (ref 37–54)
EGFRCR SERPLBLD CKD-EPI 2021: 100.8 ML/MIN/1.73
EOSINOPHIL # BLD AUTO: 0.1 10*3/MM3 (ref 0–0.4)
EOSINOPHIL NFR BLD AUTO: 2.7 % (ref 0.3–6.2)
ERYTHROCYTE [DISTWIDTH] IN BLOOD BY AUTOMATED COUNT: 11.7 % (ref 12.3–15.4)
FERRITIN SERPL-MCNC: 12.43 NG/ML (ref 13–150)
GLOBULIN UR ELPH-MCNC: 2.5 GM/DL
GLUCOSE SERPL-MCNC: 99 MG/DL (ref 65–99)
HCT VFR BLD AUTO: 36 % (ref 34–46.6)
HDLC SERPL-MCNC: 74 MG/DL (ref 40–60)
HGB BLD-MCNC: 12.1 G/DL (ref 12–15.9)
IMM GRANULOCYTES # BLD AUTO: 0 10*3/MM3 (ref 0–0.05)
IMM GRANULOCYTES NFR BLD AUTO: 0 % (ref 0–0.5)
IRON 24H UR-MRATE: 67 MCG/DL (ref 37–145)
IRON SATN MFR SERPL: 15 % (ref 20–50)
LDLC SERPL CALC-MCNC: 74 MG/DL (ref 0–100)
LDLC/HDLC SERPL: 0.99 {RATIO}
LYMPHOCYTES # BLD AUTO: 1.19 10*3/MM3 (ref 0.7–3.1)
LYMPHOCYTES NFR BLD AUTO: 32.1 % (ref 19.6–45.3)
MCH RBC QN AUTO: 31.4 PG (ref 26.6–33)
MCHC RBC AUTO-ENTMCNC: 33.6 G/DL (ref 31.5–35.7)
MCV RBC AUTO: 93.5 FL (ref 79–97)
MONOCYTES # BLD AUTO: 0.23 10*3/MM3 (ref 0.1–0.9)
MONOCYTES NFR BLD AUTO: 6.2 % (ref 5–12)
NEUTROPHILS NFR BLD AUTO: 2.16 10*3/MM3 (ref 1.7–7)
NEUTROPHILS NFR BLD AUTO: 58.2 % (ref 42.7–76)
PLATELET # BLD AUTO: 207 10*3/MM3 (ref 140–450)
PMV BLD AUTO: 11.1 FL (ref 6–12)
POTASSIUM SERPL-SCNC: 4.4 MMOL/L (ref 3.5–5.2)
PROT SERPL-MCNC: 6.9 G/DL (ref 6–8.5)
RBC # BLD AUTO: 3.85 10*6/MM3 (ref 3.77–5.28)
SODIUM SERPL-SCNC: 141 MMOL/L (ref 136–145)
TIBC SERPL-MCNC: 462 MCG/DL (ref 298–536)
TRANSFERRIN SERPL-MCNC: 310 MG/DL (ref 200–360)
TRIGL SERPL-MCNC: 62 MG/DL (ref 0–150)
TSH SERPL DL<=0.05 MIU/L-ACNC: 3.35 UIU/ML (ref 0.27–4.2)
VLDLC SERPL-MCNC: 12 MG/DL (ref 5–40)
WBC NRBC COR # BLD AUTO: 3.71 10*3/MM3 (ref 3.4–10.8)

## 2024-04-09 PROCEDURE — 80061 LIPID PANEL: CPT

## 2024-04-09 PROCEDURE — 99396 PREV VISIT EST AGE 40-64: CPT | Performed by: NURSE PRACTITIONER

## 2024-04-09 PROCEDURE — 99214 OFFICE O/P EST MOD 30 MIN: CPT | Performed by: NURSE PRACTITIONER

## 2024-04-09 PROCEDURE — 84466 ASSAY OF TRANSFERRIN: CPT

## 2024-04-09 PROCEDURE — 99459 PELVIC EXAMINATION: CPT | Performed by: NURSE PRACTITIONER

## 2024-04-09 PROCEDURE — 36415 COLL VENOUS BLD VENIPUNCTURE: CPT

## 2024-04-09 PROCEDURE — 80050 GENERAL HEALTH PANEL: CPT

## 2024-04-09 PROCEDURE — 83540 ASSAY OF IRON: CPT

## 2024-04-09 PROCEDURE — 82728 ASSAY OF FERRITIN: CPT

## 2024-04-09 RX ORDER — RALOXIFENE HYDROCHLORIDE 60 MG/1
60 TABLET, FILM COATED ORAL DAILY
Qty: 90 TABLET | Refills: 3 | Status: SHIPPED | OUTPATIENT
Start: 2024-04-09

## 2024-04-09 NOTE — PROGRESS NOTES
GYN ONCOLOGY ANNUAL WELL WOMAN VISIT      Maddi Betancur  [id number]  1965      Subjective   Chief Complaint: No chief complaint on file.   Answers submitted by the patient for this visit:  Other (Submitted on 4/2/2024)  Please describe your symptoms.: Annual  Have you had these symptoms before?: Yes  How long have you been having these symptoms?: 1-4 days  Please list any medications you are currently taking for this condition.: n/a  Primary Reason for Visit (Submitted on 4/2/2024)  What is the primary reason for your visit?: Other       History of present illness:   Maddi Betancur is a 58 y.o. year old female who is here today for an annual exam and high risk follow up. Patient underwent bilateral breast reduction in 11/2019 after a 70 lb weight loss through diet and exercise. Pathology from her reduction revealed atypical lobular hyperplasia (ALH). She has had negative genetic testing and found to have a lifetime risk of breast cancer up to 30.7% by CARLI/Stephanie. She has been followed by our cancer risk management clinic since that time and is now having her annual exams here as well.     Today, she reports concern related to increasing weight.  States that in the past she has lost over 90 pounds via hard work, diet, and regular exercise.  She is status post excess skin removal by Dr. Chris.  Weight has been fluctuating for the past 6 months, but steadily increasing over the past 4 weeks.  Other changes she notes include bowel habit changes.  Constipation is more consistent now.  States that she takes a stool softener daily and laxatives as needed.  Had diarrhea yesterday, but constipated again today.  She is scheduled for her every 3 year colonoscopy next month.  Also c/o hot flashes--night sweats have become more consistent since about 1 month ago.  We reviewed her last labs on file which work from 11 months ago.  I inquired about mild iron deficiency, and she notes this is not a new problem and  she attributes to frequent blood donation (q8 weeks). She will occasionally get turned down due to low iron. PCP is Dr Desirae Harris. Otherwise, no other concerns or changes. Specifically, she denies vaginal bleeding or discharge, pelvic pain, changes in bladder function, new or concerning lesions, and breast problems. Well woman screenings currently UTD. She has a history of very early cervical cancer in her 20's (prior to having her children) that was treated conservatively to maintain fertility. She later underwent hysterectomy with ovarian preservation at age 45. She continues Evista for chemoprevention which was started in 2020. Tolerating well.     -Last pap 3/2023 showing ASCUS, negative HPV. Repeat recommended today in light of remote history of very early cervical cancer.   -Last colonoscopy 2021, repeat recommended 3 yrs (by Dr Tasha Vallejo @[facility]) for tubular adenoma x3. Scheduled 24.  -Last bilateral breast MRI 2023= birads-1, due 2024  -Last bilateral mammogram 2023= birads-2,  Currently scheduled 2024.   -On Evista for chemoprevention, started 2020  -DEXA followed closely by PCP. Reports that most recent DEXA showed improvement. She has a history of spontaneous fractures and takes Forteo injections as prescribed by her PCP for osteoporosis. Also continues calcium & vitamin D supplement. Noted allergy to prolia.   -Performs monthly self breast exams      Cancer History:   Oncology/Hematology History    No history exists.       Obstetric History:  OB History          4    Para   3    Term   3            AB   1    Living   3         SAB   1    IAB        Ectopic        Molar        Multiple        Live Births                   Menstrual History:     No LMP recorded. Patient has had a hysterectomy.          The current medication list and allergy list were reviewed and reconciled.     Past Medical History, Past Surgical History, Social History, Family History have  "been reviewed and are without significant changes except as mentioned.    Health Maintenance:  see EMR.        Review of Systems   Constitutional:  Positive for unexpected weight gain. Negative for appetite change, chills, fatigue and fever.   Respiratory: Negative.     Cardiovascular: Negative.    Gastrointestinal:  Positive for constipation.   Endocrine:        +night sweats    Genitourinary: Negative.    Neurological: Negative.    Hematological: Negative.    Psychiatric/Behavioral: Negative.           Objective   Physical Exam  Vital Signs: /55   Pulse 76   Temp 97.4 °F (36.3 °C) (Temporal)   Resp 18   Ht 158.1 cm (62.25\")   Wt 57.4 kg (126 lb 8 oz)   SpO2 94%   BMI 22.95 kg/m²   Vitals:    04/09/24 0846   PainSc: 0-No pain           General Appearance:  alert, cooperative, no apparent distress and appears stated age   Neurologic/Psych: A&O x 3, gait steady, appropriate affect   Lungs:   Clear to auscultation bilaterally; respirations regular, even, and unlabored bilaterally   Heart:  Regular rate and rhythm, no murmurs appreciated   Breasts:  Symmetrical, no masses, no lesions, no nipple discharge, fibrocystic changes bilaterally with no obvious masses, and scarring noted bilaterally consistent with surgical hx   Abdomen:   Soft, non-tender, non-distended, and no organomegaly   Lymph nodes: No cervical, supraclavicular, inguinal or axillary adenopathy noted   Extremities: Normal, atraumatic; no clubbing, cyanosis, or edema    Skin: No rashes, ulcers, or suspicious lesions noted   Pelvic: External Genitalia  without lesions or skin changes  Vagina  is pink, moist, without lesions.  and scant amt of normal appearing, white vaginal d/c in vaginal vault.  Vaginal Cuff  Female Vaginal Cuff: smooth, intact, without visible lesions, and pap obtained  Uterus  surgically absent  Ovaries  surgically absent bilaterally and without palpable masses or fullness  Parametria  smooth  Rectovaginal  Female " rectovaginal: deferred  deferred     ECOG score: 0             Result Review :    -Bilateral breast MRI 11-30-23  -bilateral breast mmg 5-15-23  PHQ-9 Total Score: 1    Procedure Note:         Diagnoses and all orders for this visit:    PLAN:  -PAP due today, collected during pelvic exam.  -Colonoscopy is due now, currently scheduled 5-6-24 with Dr Tasha Vallejo @[facility]. Notable that she has been having recent bowel habit changes and remote history of iron deficiency. Unsure of previous upper endoscopies.   -Bilateral screening mmg due next month. Order already placed and currently scheduled on 5-23-24.  -Bilateral breast screening MRI due after 11-30-24, order placed.    -DEXA managed by PCP, f/u as scheduled and continue current tx as prescribed  -Continue Evista for chemoprevention (started 8/2020). A 1 yr supply sent to pharmacy during visit.    -In light of c/o noted today (see hpi) and 11m since last lab check, we will collect routine annual labs at this time including iron studies and ferritin level. She is NOT fasting at this time. Discussed that we will make sure no other concerning reason for weight gain, and if neg she can f/u with pcp to discuss interventions for prevention of ongoing weight gain. Also, if labs unremarkable, I will plan to discuss options to manage hot flashes.  -I will be in touch with results, as above, once available and any additional recommendations at that time.       1. Encounter for annual routine gynecological examination (Primary)  -     LIQUID-BASED PAP SMEAR WITH HPV GENOTYPING REGARDLESS OF INTERPRETATION (TARIQ,COR,MAD); Future  -     CBC & Differential; Future  -     Comprehensive Metabolic Panel; Future  -     Lipid Panel; Future  -     TSH Rfx On Abnormal To Free T4; Future  -     Iron Profile; Future  -     Ferritin; Future  -     LIQUID-BASED PAP SMEAR WITH HPV GENOTYPING REGARDLESS OF INTERPRETATION (TARIQ,COR,MAD)    2. History of cervical cancer  -     LIQUID-BASED PAP  SMEAR WITH HPV GENOTYPING REGARDLESS OF INTERPRETATION (TARIQ,COR,MAD); Future  -     LIQUID-BASED PAP SMEAR WITH HPV GENOTYPING REGARDLESS OF INTERPRETATION (TARIQ,COR,MAD)    3. Atypical lobular hyperplasia (ALH) of left breast    4. At high risk for breast cancer  -     MRI Breast Bilateral Screening With & Without Contrast; Future    5. Encounter for breast cancer screening using non-mammogram modality  -     MRI Breast Bilateral Screening With & Without Contrast; Future    6. Unexplained weight gain  -     CBC & Differential; Future  -     Lipid Panel; Future  -     TSH Rfx On Abnormal To Free T4; Future    7. History of iron deficiency  -     Iron Profile; Future  -     Ferritin; Future    8. Hot flashes  -     CBC & Differential; Future    9. Acquired hypothyroidism  -     TSH Rfx On Abnormal To Free T4; Future       Pap was done today. If she does not receive the results of the Pap within 2 weeks  time, she was instructed to call to find out the results.       Reviewed screening schedule related to high risk for breast cancer as follows:  Monthly breast self-exam starting at age 18.  Clinical breast exam every 6 months.  Annual mammogram starting at age 40, or 10 years prior to the earliest diagnosis in the family.   Annual MRI starting at age 40, or 10 years prior to the earliest diagnosis in the family.   Consider chemoprevention for breast cancer risk reduction (tamoxifen/evista).      Discussed the purpose of high risk clinic in coordinating, scheduling, and planning screening interventions to include yearly mammograms, yearly screening breast MRI, Q6 month CBE, and monthly SBE. Colonoscopy and GYN screening per national guidelines.   Patient desired to move her annual exams to our office, completed today, repeat in 1 year. She does have a history of early cervical cancer, s/p cryo and hysterectomy, will continue periodic pap smears of the vaginal cuff. Colonoscopy UTD, repeat is scheduled for next month.     Reviewed technique for SBE including concerning findings to report.  Continue every 6 month clinical breast exams. CBE normal today.  Discussed yearly screening mammogram. Results will be reported by breast imaging center and a copy of report will be sent to our office for review.   Discussed screening breast MRI scheduling based on menstrual cycles, HRT. We also discussed the increased sensitivity of MRI screening and possibility of call- backs for additional imaging or biopsies to establish baseline.   Discussed vitamin D as benefit to bone health and possible benefit to breast health. Recommended 1000 IU daily unless contraindicated  Discussed benefit of Vitamin E for fibrocystic breasts/breast pain  Discussed tamoxifen/evista use for chemoprevention including risk and benefits of use as well as side effects of medication. Patient initiated prescription Evista in 8/2020 and is happy with medication thus far, no side effects of note or complications, continue.     Pain assessment was performed today as a part of patient’s care. For patients with pain related to surgery, gynecologic malignancy or cancer treatment, the plan is as noted in the assessment/plan.  For patients with pain not related to these issues, they are to seek any further needed care from a more appropriate provider, such as PCP.      Advance Care Planning   ACP discussion was held with the patient during this visit. Patient has an advance directive in EMR which is still valid.  Patient has an advance directive (not in EMR), copy requested.       Level of service justified based on 45 minutes spent in patient care on this date of service including, but not limited to: preparing to see the patient, obtaining and/or reviewing history, performing medically appropriate examination, ordering tests/medicine/procedures, independently interpreting results, documenting clinical information in EHR, and counseling/education of patient/family/caregiver.      Follow-up:     Return to clinic:  -f/u 6m (10/2024) for high risk visit  -f/u 1 yr (4/2025) for annual and high risk visit    Electronically signed by ALLAN Fontenot on 04/09/2024

## 2024-04-10 ENCOUNTER — TELEPHONE (OUTPATIENT)
Dept: GYNECOLOGIC ONCOLOGY | Facility: CLINIC | Age: 59
End: 2024-04-10
Payer: COMMERCIAL

## 2024-04-10 NOTE — TELEPHONE ENCOUNTER
----- Message from ALLAN Lindsey sent at 4/9/2024 10:18 PM EDT -----  Please call patient regarding lab results from today.  While there was no overt anemia, she is again noted to have iron deficiency.  Additionally, ferritin level or iron stores are also low-- previously WNL.  Please touch base with your PCP regarding iron deficiency and any workup indicated.  Until this has been completed, I do advise to hold on any future blood donations.  All other labs were normal. This includes: blood counts, cholesterol, thyroid, sugar, electrolytes, kidney & liver function.  Hot flashes are likely a side effect from Evista, but I encouraged her to complete 5 years of therapy (8/2025) if possible.  If she is interested in potentially starting another med to combat hot flashes let me know and I will send trial Rx to pharmacy.

## 2024-04-10 NOTE — TELEPHONE ENCOUNTER
Notified and verbalizes understanding. States she does not wish to have any more meds but will call if that changes. Understands hotflashes are side effect of medication.

## 2024-04-12 LAB — REF LAB TEST METHOD: NORMAL

## 2024-04-16 ENCOUNTER — TELEPHONE (OUTPATIENT)
Dept: GYNECOLOGIC ONCOLOGY | Facility: CLINIC | Age: 59
End: 2024-04-16
Payer: COMMERCIAL

## 2024-04-16 NOTE — TELEPHONE ENCOUNTER
----- Message from ALLAN Lindsey sent at 4/16/2024 12:49 AM EDT -----  Please call pt and let her know pap smear unchanged and again showing ASCUS with negative HPV. We will plan to repeat in 1 year. She needs follow up apts scheduled-- [facility] in 10/2024 and annual exam + high risk visit in 4/2024.

## 2024-04-18 ENCOUNTER — TELEPHONE (OUTPATIENT)
Dept: SURGERY | Facility: CLINIC | Age: 59
End: 2024-04-18
Payer: COMMERCIAL

## 2024-04-18 NOTE — TELEPHONE ENCOUNTER
ADVISED PATIENT SHE IS SCHEDULED FOR 5/6/24 FOR COLON. SHE SAID SHE HAS RECEIVED HER INSTRUCTIONS IN THE MAIL YET.

## 2024-05-03 ENCOUNTER — PREP FOR SURGERY (OUTPATIENT)
Dept: OTHER | Facility: HOSPITAL | Age: 59
End: 2024-05-03
Payer: COMMERCIAL

## 2024-05-03 DIAGNOSIS — Z12.11 COLON CANCER SCREENING: Primary | ICD-10-CM

## 2024-05-03 RX ORDER — SODIUM CHLORIDE 9 MG/ML
40 INJECTION, SOLUTION INTRAVENOUS AS NEEDED
Status: CANCELLED | OUTPATIENT
Start: 2024-05-03

## 2024-05-03 RX ORDER — SODIUM CHLORIDE 0.9 % (FLUSH) 0.9 %
10 SYRINGE (ML) INJECTION EVERY 12 HOURS SCHEDULED
Status: CANCELLED | OUTPATIENT
Start: 2024-05-03

## 2024-05-03 RX ORDER — SODIUM CHLORIDE 0.9 % (FLUSH) 0.9 %
10 SYRINGE (ML) INJECTION AS NEEDED
Status: CANCELLED | OUTPATIENT
Start: 2024-05-03

## 2024-05-03 RX ORDER — SODIUM CHLORIDE, SODIUM LACTATE, POTASSIUM CHLORIDE, CALCIUM CHLORIDE 600; 310; 30; 20 MG/100ML; MG/100ML; MG/100ML; MG/100ML
50 INJECTION, SOLUTION INTRAVENOUS CONTINUOUS
Status: CANCELLED | OUTPATIENT
Start: 2024-05-03

## 2024-05-06 ENCOUNTER — HOSPITAL ENCOUNTER (OUTPATIENT)
Facility: HOSPITAL | Age: 59
Setting detail: HOSPITAL OUTPATIENT SURGERY
Discharge: HOME | End: 2024-05-06
Attending: SURGERY | Admitting: SURGERY
Payer: COMMERCIAL

## 2024-05-06 ENCOUNTER — ANESTHESIA EVENT (OUTPATIENT)
Dept: GASTROENTEROLOGY | Facility: HOSPITAL | Age: 59
End: 2024-05-06
Payer: COMMERCIAL

## 2024-05-06 ENCOUNTER — ANESTHESIA (OUTPATIENT)
Dept: GASTROENTEROLOGY | Facility: HOSPITAL | Age: 59
End: 2024-05-06
Payer: COMMERCIAL

## 2024-05-06 VITALS
RESPIRATION RATE: 15 BRPM | SYSTOLIC BLOOD PRESSURE: 111 MMHG | DIASTOLIC BLOOD PRESSURE: 59 MMHG | TEMPERATURE: 97.6 F | WEIGHT: 126 LBS | HEIGHT: 62 IN | HEART RATE: 81 BPM | OXYGEN SATURATION: 95 % | BODY MASS INDEX: 23.19 KG/M2

## 2024-05-06 DIAGNOSIS — Z12.11 COLON CANCER SCREENING: ICD-10-CM

## 2024-05-06 PROCEDURE — 25010000002 MIDAZOLAM PER 1MG: Performed by: NURSE ANESTHETIST, CERTIFIED REGISTERED

## 2024-05-06 PROCEDURE — 45380 COLONOSCOPY AND BIOPSY: CPT | Performed by: SURGERY

## 2024-05-06 PROCEDURE — 25810000003 LACTATED RINGERS PER 1000 ML: Performed by: NURSE ANESTHETIST, CERTIFIED REGISTERED

## 2024-05-06 PROCEDURE — 25010000002 FENTANYL CITRATE (PF) 50 MCG/ML SOLUTION: Performed by: NURSE ANESTHETIST, CERTIFIED REGISTERED

## 2024-05-06 PROCEDURE — S0260 H&P FOR SURGERY: HCPCS | Performed by: SURGERY

## 2024-05-06 PROCEDURE — 25010000002 PROPOFOL 10 MG/ML EMULSION: Performed by: NURSE ANESTHETIST, CERTIFIED REGISTERED

## 2024-05-06 RX ORDER — PROPOFOL 10 MG/ML
VIAL (ML) INTRAVENOUS AS NEEDED
Status: DISCONTINUED | OUTPATIENT
Start: 2024-05-06 | End: 2024-05-06 | Stop reason: SURG

## 2024-05-06 RX ORDER — ACETAMINOPHEN 325 MG/1
650 TABLET ORAL ONCE
Status: COMPLETED | OUTPATIENT
Start: 2024-05-06 | End: 2024-05-06

## 2024-05-06 RX ORDER — KETAMINE HCL IN NACL, ISO-OSM 100MG/10ML
SYRINGE (ML) INJECTION AS NEEDED
Status: DISCONTINUED | OUTPATIENT
Start: 2024-05-06 | End: 2024-05-06 | Stop reason: SURG

## 2024-05-06 RX ORDER — MIDAZOLAM HYDROCHLORIDE 2 MG/2ML
INJECTION, SOLUTION INTRAMUSCULAR; INTRAVENOUS AS NEEDED
Status: DISCONTINUED | OUTPATIENT
Start: 2024-05-06 | End: 2024-05-06 | Stop reason: SURG

## 2024-05-06 RX ORDER — SODIUM CHLORIDE 0.9 % (FLUSH) 0.9 %
10 SYRINGE (ML) INJECTION AS NEEDED
Status: DISCONTINUED | OUTPATIENT
Start: 2024-05-06 | End: 2024-05-06 | Stop reason: HOSPADM

## 2024-05-06 RX ORDER — ONDANSETRON 2 MG/ML
4 INJECTION INTRAMUSCULAR; INTRAVENOUS ONCE AS NEEDED
Status: DISCONTINUED | OUTPATIENT
Start: 2024-05-06 | End: 2024-05-06 | Stop reason: HOSPADM

## 2024-05-06 RX ORDER — SODIUM CHLORIDE 9 MG/ML
40 INJECTION, SOLUTION INTRAVENOUS AS NEEDED
Status: DISCONTINUED | OUTPATIENT
Start: 2024-05-06 | End: 2024-05-06 | Stop reason: HOSPADM

## 2024-05-06 RX ORDER — SODIUM CHLORIDE, SODIUM LACTATE, POTASSIUM CHLORIDE, CALCIUM CHLORIDE 600; 310; 30; 20 MG/100ML; MG/100ML; MG/100ML; MG/100ML
INJECTION, SOLUTION INTRAVENOUS CONTINUOUS PRN
Status: DISCONTINUED | OUTPATIENT
Start: 2024-05-06 | End: 2024-05-06 | Stop reason: SURG

## 2024-05-06 RX ORDER — SODIUM CHLORIDE, SODIUM LACTATE, POTASSIUM CHLORIDE, CALCIUM CHLORIDE 600; 310; 30; 20 MG/100ML; MG/100ML; MG/100ML; MG/100ML
50 INJECTION, SOLUTION INTRAVENOUS CONTINUOUS
Status: DISCONTINUED | OUTPATIENT
Start: 2024-05-06 | End: 2024-05-06 | Stop reason: HOSPADM

## 2024-05-06 RX ORDER — FENTANYL CITRATE 50 UG/ML
INJECTION, SOLUTION INTRAMUSCULAR; INTRAVENOUS AS NEEDED
Status: DISCONTINUED | OUTPATIENT
Start: 2024-05-06 | End: 2024-05-06 | Stop reason: SURG

## 2024-05-06 RX ORDER — SODIUM CHLORIDE 0.9 % (FLUSH) 0.9 %
10 SYRINGE (ML) INJECTION EVERY 12 HOURS SCHEDULED
Status: DISCONTINUED | OUTPATIENT
Start: 2024-05-06 | End: 2024-05-06 | Stop reason: HOSPADM

## 2024-05-06 RX ADMIN — SODIUM CHLORIDE, POTASSIUM CHLORIDE, SODIUM LACTATE AND CALCIUM CHLORIDE: 600; 310; 30; 20 INJECTION, SOLUTION INTRAVENOUS at 10:01

## 2024-05-06 RX ADMIN — PROPOFOL 30 MG: 10 INJECTION, EMULSION INTRAVENOUS at 13:51

## 2024-05-06 RX ADMIN — PROPOFOL 30 MG: 10 INJECTION, EMULSION INTRAVENOUS at 14:03

## 2024-05-06 RX ADMIN — ACETAMINOPHEN 650 MG: 325 TABLET ORAL at 13:03

## 2024-05-06 RX ADMIN — PROPOFOL 30 MG: 10 INJECTION, EMULSION INTRAVENOUS at 13:53

## 2024-05-06 RX ADMIN — PROPOFOL 30 MG: 10 INJECTION, EMULSION INTRAVENOUS at 13:56

## 2024-05-06 RX ADMIN — PROPOFOL 30 MG: 10 INJECTION, EMULSION INTRAVENOUS at 14:00

## 2024-05-06 RX ADMIN — Medication 25 MG: at 13:47

## 2024-05-06 RX ADMIN — PROPOFOL 30 MG: 10 INJECTION, EMULSION INTRAVENOUS at 14:06

## 2024-05-06 RX ADMIN — PROPOFOL 50 MG: 10 INJECTION, EMULSION INTRAVENOUS at 13:42

## 2024-05-06 RX ADMIN — Medication 25 MG: at 13:37

## 2024-05-06 RX ADMIN — PROPOFOL 50 MG: 10 INJECTION, EMULSION INTRAVENOUS at 14:09

## 2024-05-06 RX ADMIN — MIDAZOLAM HYDROCHLORIDE 2 MG: 1 INJECTION, SOLUTION INTRAMUSCULAR; INTRAVENOUS at 13:37

## 2024-05-06 RX ADMIN — PROPOFOL 30 MG: 10 INJECTION, EMULSION INTRAVENOUS at 13:48

## 2024-05-06 RX ADMIN — PROPOFOL 30 MG: 10 INJECTION, EMULSION INTRAVENOUS at 13:45

## 2024-05-06 RX ADMIN — FENTANYL CITRATE 50 MCG: 50 INJECTION, SOLUTION INTRAMUSCULAR; INTRAVENOUS at 13:38

## 2024-05-06 NOTE — H&P
General Surgery H&P    Name:Maddi Betancur  Age: 58 y.o.  Gender: female  : 1965  MRN: [id number]  Visit Number: [id number]  Admit Date: 2024  Date of Service: 24    Patient Care Team:  Desirae Harris MD as PCP - General (Internal Medicine)      Chief complaint : colon cancer screening/surveillance      History of Present Illness:     Maddi Betancur is a 58 y.o. female patient who presents for surveillance colonoscopy. Her last colonoscopy was performed in , and several polyps were removed.  These proved to be tubular adenomas.  She has no current lower GI related complaints.    Patient Active Problem List   Diagnosis    Atopic rhinitis    Asthma    Depression    Mixed hyperlipidemia    Spina bifida occulta    Sciatica associated with disorder of lumbosacral spine    Atypical lobular hyperplasia (ALH) of left breast    Vitamin D deficiency    Osteoporosis of lumbar spine    Gastroesophageal reflux disease    Acquired hypothyroidism    S/P cosmetic plastic surgery    At high risk for breast cancer    Colon cancer screening         Past Medical History:   Diagnosis Date    Acid reflux     Acquired hypothyroidism 2021    Anemia     Arrhythmia     EXTRA HEART BEAT    Asthma     does not use inhaler and has weight loss    Atypical lobular hyperplasia (ALH) of breast     BMI 34.0-34.9,adult     Cervical cancer     stage 1 or less in early 20's (no chemo or radiation). All pap smears negative since    Chicken pox     6 weeks of age    Depression     Elevated cholesterol     Hyperlipidemia     Keloid scar of skin 2019    Measles     9 weeks of age    Osteopenia     Right kidney mass        Past Surgical History:   Procedure Laterality Date    ANKLE LIGAMENT RECONSTRUCTION Bilateral 2022    10/2022    BILATERAL BREAST REDUCTION  2019    BUNIONECTOMY Left 2018    BUNIONECTOMY Right     CLOSED MANIPULATION SHOULDER Right     COLONOSCOPY N/A 2021     Procedure: COLONOSCOPY with cold snare and cold biopsy polypectomies;  Surgeon: Tasha Vallejo MD;  Location: [facility] ENDOSCOPY;  Service: Gastroenterology;  Laterality: N/A;    DIAGNOSTIC LAPAROSCOPY      ENDOSCOPY N/A 2021    Procedure: ESOPHAGOGASTRODUODENOSCOPY WITH BIOPSY and polypectomy;  Surgeon: Tasha Vallejo MD;  Location: [facility] ENDOSCOPY;  Service: Gastroenterology;  Laterality: N/A;    EXCISION URETHRAL DIVERTICULUM FEMALE  2021    Repair    EXPLORATORY LAPAROTOMY      GANGLION CYST EXCISION      GYNECOLOGIC CRYOSURGERY      for cervical CA    HYSTERECTOMY      LOWER BODY LIFT N/A 2023    Procedure: LOWER BODY LIFT;  Surgeon: Bruno Chris MD;  Location: [facility] OR;  Service: Plastics;  Laterality: N/A;    REDUCTION MAMMAPLASTY      RHINOPLASTY         Family History   Problem Relation Age of Onset    Lung cancer Mother     Cervical cancer Mother     Lymphoma Mother         non-Hodgkin's    Hyperlipidemia Mother     Hypertension Mother     Cancer Mother     Heart disease Father     Hyperlipidemia Father     Breast cancer Maternal Aunt     Colon cancer Maternal Aunt         40s    Ovarian cancer Neg Hx        Social History     Socioeconomic History    Marital status:    Tobacco Use    Smoking status: Former     Current packs/day: 0.00     Average packs/day: 1 pack/day for 15.0 years (15.0 ttl pk-yrs)     Types: Cigarettes     Start date: 10/1/1983     Quit date: 10/1/1998     Years since quittin.6    Smokeless tobacco: Never    Tobacco comments:     quit more than 20 years ago   Vaping Use    Vaping status: Never Used   Substance and Sexual Activity    Alcohol use: No    Drug use: No    Sexual activity: Defer         Current Facility-Administered Medications:     lactated ringers infusion, 50 mL/hr, Intravenous, Continuous, Tasha Vallejo MD    Facility-Administered Medications Ordered in Other Encounters:     lactated ringers infusion, , Intravenous,  Continuous PRN, Anatoly Thornton, CRNA, New Bag at 05/06/24 1001    Medications Prior to Admission   Medication Sig Dispense Refill Last Dose    albuterol sulfate  (90 Base) MCG/ACT inhaler Inhale 2 puffs Every 4 (Four) Hours As Needed for Wheezing or Shortness of Air. 8 g 5 Past Month    B Complex Vitamins (VITAMIN B COMPLEX PO) Take 1 tablet by mouth Daily.   5/5/2024 at 0800    bisacodyl (Dulcolax) 5 MG EC tablet Use as directed in bowel prep instructions 4 tablet 0 5/5/2024 at 1800    buPROPion XL (WELLBUTRIN XL) 300 MG 24 hr tablet Take 1 tablet by mouth Every Morning. 90 tablet 3 5/5/2024 at 0800    Calcium Carbonate-Vitamin D (TGT CALCIUM DIETARY SUPPLEMENT PO) Take 600 mg by mouth Daily.   Past Month    fluticasone (FLONASE) 50 MCG/ACT nasal spray 2 sprays into the nostril(s) as directed by provider Daily. Uses prn   Past Month    Multiple Vitamins-Minerals (AIRBORNE PO) Take 1 tablet by mouth Daily.   5/5/2024 at 0800    raloxifene (EVISTA) 60 MG tablet Take 1 tablet by mouth Daily. 90 tablet 3 5/5/2024 at 0800    rosuvastatin (CRESTOR) 10 MG tablet Take 1 tablet by mouth Every Night. To lower cholesterol and risk of stroke. 90 tablet 3 5/5/2024 at 2100    Synthroid 50 MCG tablet TAKE 1 TABLET DAILY ON AN  EMPTY STOMACH AND WAIT 30  MINUTES BEFORE EATING OR   TAKING OTHER MEDICATIONS 90 tablet 0 5/5/2024 at 0800    betamethasone valerate (VALISONE) 0.1 % cream Apply 1 Application topically to the appropriate area as directed Daily. As needed for dry patchy areas   Unknown    EPIPEN 2-DONNA 0.3 MG/0.3ML solution auto-injector injection Inject 0.3 mL into the appropriate muscle as directed by prescriber 1 (One) Time. As needed  0 Unknown       Allergies   Allergen Reactions    Prolia [Denosumab] Other (See Comments)     Severe joint pain    Strawberry Rash       Review of Systems   Constitutional: Negative.    HENT: Negative.     Eyes: Negative.    Respiratory: Negative.     Cardiovascular: Negative.     Gastrointestinal: Negative.    Endocrine: Negative.    Genitourinary: Negative.    Musculoskeletal: Negative.    Skin: Negative.    Allergic/Immunologic: Negative.    Neurological: Negative.    Hematological: Negative.    Psychiatric/Behavioral: Negative.         OBJECTIVE:     Vital Signs  Temp:  [97.5 °F (36.4 °C)] 97.5 °F (36.4 °C)  Heart Rate:  [84] 84  Resp:  [18] 18  BP: (120)/(82) 120/82    No intake/output data recorded.  No intake/output data recorded.      Physical Exam:      General Appearance:    Alert, cooperative, in no acute distress   Head:    Normocephalic, without obvious abnormality, atraumatic   Eyes:            Lids and lashes normal, conjunctivae and sclerae normal, no icterus   Ears:    Ears appear intact with no abnormalities noted   Lungs:     Respirations regular, even and unlabored    Heart:    Regular rhythm and normal rate   Abdomen:     Soft, non-tender, non-distended, no guarding, no rebound   tenderness   Genitalia:    Deferred   Extremities:   Moves all extremities well, no edema, no cyanosis, no  redness   Pulses:   Pulses palpable and equal bilaterally   Skin:   No bleeding, bruising or rash   Neurologic:   AAOx3, no gross deficits         Results Review:   I have reviewed the entirety of the patient's clinical lab results.  I have also personally reviewed the patient's imaging      Lab Results (last 72 hours)       ** No results found for the last 72 hours. **                            ASSESSMENT/PLAN:      Colon cancer screening    We discussed colonoscopy for colon cancer screening purposes.  We discussed the indications for screening colonoscopy as well as the risks, benefits and alternatives to this procedure. Risks including but not limited to perforation, bleeding,need for blood transfusion or emergent surgery ,and missed neoplasm were reviewed in detail with the patient.   The patient was given an opportunity to ask questions.  The patient verbalized understanding of  these recommendations and the plan of care. The patient is willing to proceed with colonoscopy and has signed informed consent.  .    Tasha Vallejo MD  05/06/24  13:47 EDT

## 2024-05-08 LAB — REF LAB TEST METHOD: NORMAL

## 2024-05-23 ENCOUNTER — HOSPITAL ENCOUNTER (OUTPATIENT)
Dept: MAMMOGRAPHY | Facility: HOSPITAL | Age: 59
Discharge: HOME | End: 2024-05-23
Admitting: STUDENT IN AN ORGANIZED HEALTH CARE EDUCATION/TRAINING PROGRAM
Payer: COMMERCIAL

## 2024-05-23 DIAGNOSIS — Z12.31 SCREENING MAMMOGRAM FOR BREAST CANCER: ICD-10-CM

## 2024-05-23 PROCEDURE — 77067 SCR MAMMO BI INCL CAD: CPT

## 2024-05-23 PROCEDURE — 77063 BREAST TOMOSYNTHESIS BI: CPT

## 2024-05-23 NOTE — BRIEF OP NOTE
COLONOSCOPY     Patient Name:  Maddi Betancur  YOB: 1965    Date of Surgery:  5/6/2024      Pre-op Diagnosis:   Colon cancer screening [Z12.11]       Post-Op Diagnosis Codes:     * Colon cancer screening [Z12.11]    Procedure/CPT® Codes:      Procedure(s):  COLONOSCOPY WITH POLYPECTOMY X 2        Staff:  Surgeon(s):  Tasha Vallejo MD         Anesthesia: Monitored Anesthesia Care    Estimated Blood Loss: none    Implants:    Nothing was implanted during the procedure    Specimen:          Specimens       ID Source Type Tests Collected By Collected At [facility]?    A Large Intestine, Right / Ascending Colon Polyp TISSUE EXAM, [facility])   Melva Gonzalez, RN 5/6/24  2:11 PM No    Description: cold ascending colon polypectomy    B Large Intestine, Transverse Colon Polyp TISSUE EXAM, [facility] ([facility])   Melva Gonzalez, RN 5/6/24  2:14 PM No    Description: transverse colon polypectomy                  Complications: none                      Tasha Vallejo MD

## 2024-05-30 ENCOUNTER — TELEPHONE (OUTPATIENT)
Dept: SURGERY | Facility: CLINIC | Age: 59
End: 2024-05-30
Payer: COMMERCIAL

## 2024-10-15 ENCOUNTER — OFFICE VISIT (OUTPATIENT)
Dept: ONCOLOGY | Facility: CLINIC | Age: 59
End: 2024-10-15
Payer: COMMERCIAL

## 2024-10-15 VITALS
DIASTOLIC BLOOD PRESSURE: 83 MMHG | WEIGHT: 126 LBS | BODY MASS INDEX: 23.19 KG/M2 | TEMPERATURE: 98 F | HEART RATE: 73 BPM | SYSTOLIC BLOOD PRESSURE: 119 MMHG | RESPIRATION RATE: 12 BRPM | HEIGHT: 62 IN

## 2024-10-15 DIAGNOSIS — Z91.89 AT HIGH RISK FOR BREAST CANCER: Primary | ICD-10-CM

## 2024-10-15 DIAGNOSIS — N60.92 ATYPICAL LOBULAR HYPERPLASIA (ALH) OF LEFT BREAST: Chronic | ICD-10-CM

## 2024-10-15 RX ORDER — DIAZEPAM 5 MG
1 TABLET ORAL EVERY 8 HOURS PRN
COMMUNITY
End: 2024-10-15

## 2024-10-15 RX ORDER — OXYCODONE HYDROCHLORIDE 5 MG/1
1 TABLET ORAL EVERY 4 HOURS PRN
COMMUNITY
End: 2024-10-15

## 2024-10-15 RX ORDER — LEVOTHYROXINE SODIUM 75 UG/1
75 TABLET ORAL DAILY
COMMUNITY

## 2024-10-15 RX ORDER — PROMETHAZINE HYDROCHLORIDE 12.5 MG/1
1 TABLET ORAL EVERY 4 HOURS PRN
COMMUNITY
End: 2024-10-15

## 2024-10-15 NOTE — PROGRESS NOTES
High Risk Oncology Clinic Follow Up    Date of Encounter: 10/15/2024     Maddi Betancur  [id number]  1965    PCP: Desirae Harris MD    Reason for Visit:  High Risk for Cancer    Problem List:  1. High Risk for Breast Cancer  A. Mammogram:    .  24:  Scattered areas of fibroglandular density.  Bilateral mammoplasty.  BI-RADS 2  B. Breast MRI:  .  23 breast MRI:  Predominately adipose tissue.  No suspicious enhancement.  BI-RADS 1  C. Breast Biopsy:  . 2019:  Bilateral breast mammoplasty with atypical lobular hyperplasia in the left breast on pathology.    D.Stephanie Results:  30.7%  E. Genetic Testin CancerNext Panel negative  F. EGD/Colonoscopy/Cologuard:   2021 EGD:  reactive gastropathy, fundic gland polyp, reactive gastric mucosa.    2. 24 colonoscopy:  Right colon/transverse colon polyps: tubular adenoma.  Repeat in 5 years  2.  ALH left breast  A.  Started on Evista 2020  3. Cervical cancer Stage I (in 20's) s/p cryosurgery/chemical  A. Last Pap Smear:  24:  Epithelial cell abnormality. (ASC) Atypical squamous cells of undetermined significance.  Plans to repeat in 1 year    4. Asthma  5. HLP  6. GERD  7. Osteopenia/Osteoporosis  A. 22 DEXA followed closely by PCP: T-score of -1.8 Osteopenia. Completed Forteo injections.  She has a history of spontaneous fractures.  Continues calcium & vitamin D supplement.  Allergy to prolia.   8. Hypothyroidism    Pertinent surgical history:  Bilateral mammoplasty  Lower body lift  Hysterectomy with bilateral ovarian/fallopian tube preservation     History of Present Illness:     Maddi Betancur is a 59 y.o. year old female here today for follow up in the high risk oncology clinic. She has been following with GYN/ONC high risk and last seen by Maryann LEMUS 2024.  She underwent annual exam/pap smear at that time.  Pap with ASC of undetermined significance.  History of Cervical CA in her 20's s/p  cryosurgery.  Plans to follow up in 2025 with repeat PAP.  She was referred to our clinic due to an elevated Tyrer-Cuzick score of 30.7% on recent screening mammogram questionnaire.  She has a family history of breast cancer in her maternal aunt in her 50's.  Significant family history of cancer in her sisters, mother and maternal aunts/grandmother (see below).  Pt has undergone genetic testing utilizing CancerNext Panel in  and was negative.  Performing monthly self breast exams.  Negative for breast discharge, mass, unilateral pain, asymmetry or other recent changes.  Last mammogram in May 2024 with was negative.  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  Has received injections with Dr. Chris historically and plans to reach out to his office.  Breast MRI Scheduled for 24.  Evista started 2020 and tolerating well.  Occasional hot flash.      Pertinent Oncology Family History (Age of diagnosis):    Sisters:                                                #1 and #2  Cervical cancer  Mother:                                     Cervical, NHL, 70s; Lung cancer, 70s  Mat. Aunt:                                            #1 Breast cancer, 50; #2 Colon 40's  Mat. Grandmother:                              Lung cancer  Mat. 1st cousin once removed:          Colon cancer, 60s  Several maternal great aunts and uncles reported to have cancer (unspecified cancer types).     Reproductive:    Age of first menstrual period:  8    Age of first live birth:  28  Pre/Lauren/Postmenopause:  Post menopausal  Retains ovaries/uterus:  Retains tubes/ovaries  LMP:  2009 hysterectomy  HRT use:  No history      Review of Systems   Constitutional: Negative for weight loss.   Cardiovascular:  Negative for chest pain.   Respiratory:  Negative for shortness of breath.    Hematologic/Lymphatic: Negative for adenopathy.   Skin:  Negative for suspicious lesions.    Neurological:  Negative for headaches.       Allergies:  Allergies   Allergen Reactions    Prolia [Denosumab] Other (See Comments)     Severe joint pain    Strawberry Extract Unknown - Low Severity    Strawberry Rash         Current Outpatient Medications:     albuterol sulfate  (90 Base) MCG/ACT inhaler, Inhale 2 puffs Every 4 (Four) Hours As Needed for Wheezing or Shortness of Air., Disp: 8 g, Rfl: 5    B Complex Vitamins (VITAMIN B COMPLEX PO), Take 1 tablet by mouth Daily., Disp: , Rfl:     betamethasone valerate (VALISONE) 0.1 % cream, Apply 1 Application topically to the appropriate area as directed Daily. As needed for dry patchy areas, Disp: , Rfl:     buPROPion XL (WELLBUTRIN XL) 300 MG 24 hr tablet, Take 1 tablet by mouth Every Morning., Disp: 90 tablet, Rfl: 3    Calcium Carbonate-Vitamin D (TGT CALCIUM DIETARY SUPPLEMENT PO), Take 600 mg by mouth Daily., Disp: , Rfl:     Ferrous Gluconate (IRON 27 PO), Take  by mouth Every Other Day., Disp: , Rfl:     fluticasone (FLONASE) 50 MCG/ACT nasal spray, Administer 2 sprays into the nostril(s) as directed by provider Daily. Uses prn, Disp: , Rfl:     levothyroxine (SYNTHROID, LEVOTHROID) 75 MCG tablet, Take 1 tablet by mouth Daily., Disp: , Rfl:     Multiple Vitamins-Minerals (AIRBORNE PO), Take 1 tablet by mouth Daily., Disp: , Rfl:     NON FORMULARY, LIQUID BIOTIN/COLLAGEN, Disp: , Rfl:     raloxifene (EVISTA) 60 MG tablet, Take 1 tablet by mouth Daily., Disp: 90 tablet, Rfl: 3    rosuvastatin (CRESTOR) 10 MG tablet, Take 1 tablet by mouth Every Night. To lower cholesterol and risk of stroke., Disp: 90 tablet, Rfl: 3    EPIPEN 2-DONNA 0.3 MG/0.3ML solution auto-injector injection, Inject 0.3 mL into the appropriate muscle as directed by prescriber 1 (One) Time. As needed, Disp: , Rfl: 0    The current medication list and allergy list were reviewed and reconciled.     Social History     Socioeconomic History    Marital status:    Tobacco Use     Smoking status: Former     Current packs/day: 0.00     Average packs/day: 1 pack/day for 15.0 years (15.0 ttl pk-yrs)     Types: Cigarettes     Start date: 10/1/1983     Quit date: 10/1/1998     Years since quittin.0    Smokeless tobacco: Never    Tobacco comments:     quit more than 20 years ago   Vaping Use    Vaping status: Never Used   Substance and Sexual Activity    Alcohol use: Yes     Comment: SOCIAL 2-3 MONTHLY    Drug use: No    Sexual activity: Defer       Past Medical History:   Diagnosis Date    Acid reflux     Acquired hypothyroidism 2021    Anemia     Arrhythmia     EXTRA HEART BEAT    Asthma     does not use inhaler and has weight loss    Atypical lobular hyperplasia (ALH) of breast     BMI 34.0-34.9,adult     Cervical cancer     stage 1 or less in early 20's (no chemo or radiation). All pap smears negative since    Chicken pox     6 weeks of age    Depression     Elevated cholesterol     Hyperlipidemia 2018    Keloid scar of skin 2019    Measles     9 weeks of age    Osteopenia     Right kidney mass        Past Surgical History:   Procedure Laterality Date    ANKLE LIGAMENT RECONSTRUCTION Bilateral 2022    10/2022    BILATERAL BREAST REDUCTION  2019    BILATERAL BREAST REDUCTION      BUNIONECTOMY Left 2018    BUNIONECTOMY Right     CLOSED MANIPULATION SHOULDER Right     COLONOSCOPY N/A 2021    Procedure: COLONOSCOPY with cold snare and cold biopsy polypectomies;  Surgeon: Tasha Vallejo MD;  Location: [facility] ENDOSCOPY;  Service: Gastroenterology;  Laterality: N/A;    COLONOSCOPY N/A 2024    Procedure: COLONOSCOPY WITH POLYPECTOMY X 2;  Surgeon: Tasha Vallejo MD;  Location: [facility] ENDOSCOPY;  Service: Gastroenterology;  Laterality: N/A;    DIAGNOSTIC LAPAROSCOPY      ENDOSCOPY N/A 2021    Procedure: ESOPHAGOGASTRODUODENOSCOPY WITH BIOPSY and polypectomy;  Surgeon: Tasha Vallejo MD;  Location: [facility] ENDOSCOPY;  Service:  "Gastroenterology;  Laterality: N/A;    EXCISION URETHRAL DIVERTICULUM FEMALE  08/04/2021    Repair    EXPLORATORY LAPAROTOMY      GANGLION CYST EXCISION      GYNECOLOGIC CRYOSURGERY      for cervical CA    HYSTERECTOMY      LOWER BODY LIFT N/A 07/18/2023    Procedure: LOWER BODY LIFT;  Surgeon: Bruno Chris MD;  [facility];  Service: Plastics;  Laterality: N/A;    REDUCTION MAMMAPLASTY      RHINOPLASTY         Family History   Problem Relation Age of Onset    Lung cancer Mother         60's    Cervical cancer Mother         60\"s    Lymphoma Mother 60        non-Hodgkin's    Hyperlipidemia Mother     Hypertension Mother     Heart disease Father     Hyperlipidemia Father     Cervical cancer Sister     Cervical cancer Sister     Breast cancer Maternal Aunt         50's    Colon cancer Maternal Aunt         40s    Ovarian cancer Neg Hx        Past Medical History, Past Surgical History, Social History, Family History have been reviewed and are without significant changes except as mentioned.    Physical Exam:    /83   Pulse 73   Temp 98 °F (36.7 °C)   Resp 12   Ht 158.1 cm (62.25\")   Wt 57.2 kg (126 lb)   BMI 22.86 kg/m²   Pain Score    10/15/24 0818   PainSc: 0-No pain                    Physical Exam  Vitals and nursing note reviewed.   Constitutional:       Appearance: Normal appearance.   HENT:      Head: Normocephalic and atraumatic.   Cardiovascular:      Rate and Rhythm: Normal rate and regular rhythm.      Heart sounds: Normal heart sounds, S1 normal and S2 normal. No murmur heard.  Pulmonary:      Effort: Pulmonary effort is normal.      Breath sounds: Normal breath sounds.   Chest:   Breasts:     Right: No swelling, inverted nipple, mass, nipple discharge, skin change or tenderness.      Left: No swelling, inverted nipple, mass, nipple discharge, skin change or tenderness.      Comments: Bilateral breast mammoplasty.    Abdominal:      Palpations: Abdomen is soft.      Tenderness: " There is no abdominal tenderness.   Musculoskeletal:         General: Normal range of motion.      Cervical back: Neck supple.      Right lower leg: No edema.      Left lower leg: No edema.   Lymphadenopathy:      Cervical: No cervical adenopathy.      Upper Body:      Right upper body: No axillary adenopathy.      Left upper body: No axillary adenopathy.   Skin:     General: Skin is warm and dry.   Neurological:      General: No focal deficit present.      Mental Status: She is alert and oriented to person, place, and time.   Psychiatric:         Mood and Affect: Mood normal.         Behavior: Behavior normal. Behavior is cooperative.         Thought Content: Thought content normal.         Judgment: Judgment normal.          Lab Results   Component Value Date    WBC 3.71 04/09/2024    HGB 12.1 04/09/2024    HCT 36.0 04/09/2024    MCV 93.5 04/09/2024     04/09/2024        Lab Results   Component Value Date    GLUCOSE 99 04/09/2024    BUN 11 04/09/2024    CREATININE 0.67 04/09/2024     04/09/2024    K 4.4 04/09/2024     04/09/2024    CALCIUM 9.2 04/09/2024    PROTEINTOT 6.9 04/09/2024    ALBUMIN 4.4 04/09/2024    ALT 12 04/09/2024    AST 18 04/09/2024    ALKPHOS 79 04/09/2024    BILITOT 0.4 04/09/2024    GLOB 2.5 04/09/2024    AGRATIO 1.8 04/09/2024    BCR 16.4 04/09/2024    ANIONGAP 7.0 04/09/2024    EGFR 100.8 04/09/2024       Lab Results   Component Value Date    HGBA1C 4.70 (L) 07/11/2023       No results found.    Assessment and Plan:    Diagnoses and all orders for this visit:    1. At high risk for breast cancer (Primary)    2. Atypical lobular hyperplasia (ALH) of left breast      -Continue Monthly breast exam  -Annual Breast MRI 12/5/24  -Annual screening mammography.  May 2025  -Clinical breast exam every 6 months. Alternate between our clinic and [facility] every 6 months.  -Tyrer Cuzick score:  30.7% lifetime risk of developing breast cancer.  Average risk for women  is ~13%.  -Annual well woman/PAP with Maryann Luis APRN 25  -Genetic Testin CancerNext Panel negative  -Stable on Evista.  Plans for completion 2025  -Follow up with Dr. Chris for left breast/chest wall neuropathy    -Follow up in 12 months    I spent 33 minutes caring for Maddi.  This includes time spent by me in the following activities:  preparing for visit, reviewing tests, obtaining history, performing physical exam, education, ordering necessary medications/testing and documenting in the medical record.     Katelynn Romero, APRN  10/15/24

## 2024-12-05 ENCOUNTER — HOSPITAL ENCOUNTER (OUTPATIENT)
Facility: HOSPITAL | Age: 59
Discharge: HOME | End: 2024-12-05
Admitting: NURSE PRACTITIONER
Payer: COMMERCIAL

## 2024-12-05 DIAGNOSIS — Z12.39 ENCOUNTER FOR BREAST CANCER SCREENING USING NON-MAMMOGRAM MODALITY: ICD-10-CM

## 2024-12-05 DIAGNOSIS — Z91.89 AT HIGH RISK FOR BREAST CANCER: ICD-10-CM

## 2024-12-05 PROCEDURE — 25510000002 GADOBENATE DIMEGLUMINE 529 MG/ML SOLUTION: Performed by: NURSE PRACTITIONER

## 2024-12-05 PROCEDURE — C8908 MRI W/O FOL W/CONT, BREAST,: HCPCS

## 2024-12-05 PROCEDURE — C8937 CAD BREAST MRI: HCPCS

## 2024-12-05 PROCEDURE — A9577 INJ MULTIHANCE: HCPCS | Performed by: NURSE PRACTITIONER

## 2024-12-05 RX ADMIN — GADOBENATE DIMEGLUMINE 11 ML: 529 INJECTION, SOLUTION INTRAVENOUS at 08:59

## 2025-04-28 ENCOUNTER — OFFICE VISIT (OUTPATIENT)
Dept: GYNECOLOGIC ONCOLOGY | Facility: CLINIC | Age: 60
End: 2025-04-28
Payer: COMMERCIAL

## 2025-04-28 VITALS
BODY MASS INDEX: 23.85 KG/M2 | HEART RATE: 67 BPM | WEIGHT: 129.6 LBS | TEMPERATURE: 97.3 F | DIASTOLIC BLOOD PRESSURE: 63 MMHG | HEIGHT: 62 IN | OXYGEN SATURATION: 99 % | SYSTOLIC BLOOD PRESSURE: 112 MMHG | RESPIRATION RATE: 17 BRPM

## 2025-04-28 DIAGNOSIS — Z91.89 AT HIGH RISK FOR BREAST CANCER: ICD-10-CM

## 2025-04-28 DIAGNOSIS — Z85.41 HISTORY OF CERVICAL CANCER: ICD-10-CM

## 2025-04-28 DIAGNOSIS — Z01.419 WELL WOMAN EXAM WITH ROUTINE GYNECOLOGICAL EXAM: Primary | ICD-10-CM

## 2025-04-28 DIAGNOSIS — N60.92 ATYPICAL LOBULAR HYPERPLASIA (ALH) OF LEFT BREAST: Chronic | ICD-10-CM

## 2025-04-28 PROCEDURE — 99396 PREV VISIT EST AGE 40-64: CPT | Performed by: NURSE PRACTITIONER

## 2025-04-28 RX ORDER — RALOXIFENE HYDROCHLORIDE 60 MG/1
60 TABLET, FILM COATED ORAL DAILY
Qty: 120 TABLET | Refills: 0 | Status: SHIPPED | OUTPATIENT
Start: 2025-04-28

## 2025-04-28 RX ORDER — OLOPATADINE HYDROCHLORIDE 2 MG/ML
SOLUTION/ DROPS OPHTHALMIC
COMMUNITY
Start: 2025-03-24

## 2025-04-28 NOTE — PROGRESS NOTES
GYN ONCOLOGY ANNUAL WELL WOMAN VISIT      Maddi Betancur  [id number]  1965      Subjective   Chief Complaint: Annual Exam      Answers submitted by the patient for this visit:  Problem not listed (Submitted on 4/21/2025)  Chief Complaint: Other medical problem  Reason for appointment: pap smear and annual exam  anorexia: No  joint pain: No  headaches: Yes  joint swelling: Yes  vertigo: No  visual change: No  Onset: at an unknown time  Chronicity: recurrent  Medications tried: over the counter      History of present illness:    Maddi Betancur is a 59 y.o. year old female who is here today for an annual exam. She has a history of very early cervical cancer in her 20's (prior to having her children) that was treated conservatively to maintain fertility. She later underwent hysterectomy with ovarian preservation at age 45. This was due to ongoing, severe uterine problems. She is known to this office as well due to high risk breast cancer status and has previously been following here for her management.  Since last visit she established with ALLAN Pace at high risk office back in the fall.    During her last visit lab workup revealed iron deficiency without overt anemia.  She was advised to hold on donating blood and follow-up with PCP for workup. Pt is scheduled to see Dr Harris next month for her physical.     We also discussed bothersome hot flashes last visit. She continues evista for chemoprophylaxis due to high risk breast cancer. She will complete 5 year course in 8/2025. Today she updates that she does have hot flashes still at nighttime but does not want any specific treatment at this time-- wanting to ride it out.      Last mmg the pressure while completing exam caused her breast scar to become very swollen. She previously had received an injection from her plastic surgeon, Dr. Chris, that made it sit flat.  This was understandably frustrating.  Next mammogram due next month.    Her MAGUE is  "currently deployed. Her daughter and 4yo granddaughter have moved in with her & her  from [place].     Cancer History:   Oncology/Hematology History    No history exists.       Obstetric History:  OB History          4    Para   3    Term   3            AB   1    Living   3         SAB   1    IAB        Ectopic        Molar        Multiple        Live Births                   Menstrual History:     No LMP recorded. Patient has had a hysterectomy.          The current medication list and allergy list were reviewed and reconciled.     Past Medical History, Past Surgical History, Social History, Family History have been reviewed and are without significant changes except as mentioned.    Health Maintenance:  see EMR.  -Colonoscopy 2024 by Dr Tasha Vallejo, repeat 5 years   - Bilateral screening mammogram 24, BI-RADS 2  - Screening breast MRI 24, negative  -DEXA 2022 @[facility], osteopenic-- on evista (see above). She has a history of spontaneous fractures and recently completed Forteo injections.  Also continues calcium & vitamin D supplement. Noted allergy to prolia.   -Pap smear 24 ASCUS, negative HPV    Review of Systems   Constitutional: Negative.  Negative for chills, diaphoresis, fatigue and fever.   HENT:  Negative for congestion, sore throat and swollen glands.    Respiratory:  Negative for cough.    Cardiovascular:  Negative for chest pain.   Gastrointestinal: Negative.  Negative for abdominal pain and vomiting.   Genitourinary: Negative.  Negative for dysuria.   Musculoskeletal:  Positive for myalgias and neck pain.   Skin:  Negative for rash.   Neurological:  Negative for weakness and numbness.   Psychiatric/Behavioral: Negative.           Objective   Physical Exam  Vital Signs: /63   Pulse 67   Temp 97.3 °F (36.3 °C) (Temporal)   Resp 17   Ht 158.1 cm (62.24\")   Wt 58.8 kg (129 lb 9.6 oz)   SpO2 99%   BMI 23.52 kg/m²   Vitals:    " 04/28/25 0827   PainSc: 0-No pain           General Appearance:  alert, cooperative, no apparent distress, appears stated age, and normal weight   Neurologic/Psych: A&O x 3, gait steady, appropriate affect   Lungs:   Clear to auscultation bilaterally; respirations regular, even, and unlabored bilaterally   Heart:  Regular rate and rhythm, no murmurs appreciated   Breasts:  Symmetrical, no masses, no lesions, no nipple discharge, fibrocystic changes bilaterally with no obvious masses, and scarring noted bilaterally consistent with surgical hx    Abdomen:   Soft, non-tender, non-distended, and no organomegaly   Lymph nodes: No cervical, supraclavicular, inguinal or axillary adenopathy noted   Extremities: Normal, atraumatic; no clubbing, cyanosis, or edema    Skin: No rashes, ulcers, or suspicious lesions noted   Pelvic: External Genitalia  without lesions or skin changes  Vagina  is pink, moist, without lesions.   Vaginal Cuff  Female Vaginal Cuff: smooth, intact, without visible lesions, and pap obtained  Uterus  surgically absent and no palpable masses  Ovaries  without palpable masses or fullness  Rectovaginal  Female rectovaginal: deferred     ECOG score: 0                     Assessment and Plan:      -Continue evista as prescribed.  Will complete 5-year course in August.  Rx refilled for this quantity.    -CBE WNL today, repeat in 6m with Katelynn.     -pap smear performed today, will f/u on results once available     -Mammogram due next month, scheduled.    -Breast MRI due December.    -DEXA managed by PCP, f/u as scheduled and continue current tx as prescribed     -f/u with PCP and all specialists as scheduled     -repeat colonoscopy due 5/2029    Diagnoses and all orders for this visit:    1. Well woman exam with routine gynecological exam (Primary)  -     LIQUID-BASED PAP SMEAR WITH HPV GENOTYPING REGARDLESS OF INTERPRETATION ([facility]); Future    2. History of cervical cancer  -     LIQUID-BASED PAP  SMEAR WITH HPV GENOTYPING REGARDLESS OF INTERPRETATION ([facility]); Future      Pain assessment was performed today as a part of patient’s care. For patients with pain related to surgery, gynecologic malignancy or cancer treatment, the plan is as noted in the assessment/plan.  For patients with pain not related to these issues, they are to seek any further needed care from a more appropriate provider, such as PCP.    Follow-up:    Return to clinic in 1 year for Annual exam, ongoing cancer surveillance, and repeat pap smear.      Electronically signed by ALLAN Fontenot on 04/28/2025

## 2025-04-30 LAB — REF LAB TEST METHOD: NORMAL

## 2025-06-09 ENCOUNTER — HOSPITAL ENCOUNTER (OUTPATIENT)
Dept: MAMMOGRAPHY | Facility: HOSPITAL | Age: 60
Discharge: HOME | End: 2025-06-09
Admitting: NURSE PRACTITIONER
Payer: COMMERCIAL

## 2025-06-09 DIAGNOSIS — Z91.89 AT HIGH RISK FOR BREAST CANCER: ICD-10-CM

## 2025-06-09 DIAGNOSIS — N60.92 ATYPICAL LOBULAR HYPERPLASIA (ALH) OF LEFT BREAST: Chronic | ICD-10-CM

## 2025-06-09 PROCEDURE — 77063 BREAST TOMOSYNTHESIS BI: CPT

## 2025-06-09 PROCEDURE — 77067 SCR MAMMO BI INCL CAD: CPT

## (undated) DEVICE — Device

## (undated) DEVICE — GLV SURG BIOGEL LTX PF 7 1/2

## (undated) DEVICE — SYR LUER SLPTP 50ML

## (undated) DEVICE — SUT ETHIB 0/0 MO6 I8IN CX45D

## (undated) DEVICE — GLV SURG SENSICARE W/ALOE PF LF 6.5 STRL

## (undated) DEVICE — BNDR ABD PREMIUM/UNIV 10IN 27TO48IN

## (undated) DEVICE — DRAPE,TOP,102X53,STERILE: Brand: MEDLINE

## (undated) DEVICE — GLV SURG SENSICARE W/ALOE PF LF SZ6 STRL

## (undated) DEVICE — VLV SXN AIR/H2O ORCAPOD3 1P/U STRL

## (undated) DEVICE — INTENDED FOR TISSUE SEPARATION, AND OTHER PROCEDURES THAT REQUIRE A SHARP SURGICAL BLADE TO PUNCTURE OR CUT.: Brand: BARD-PARKER ® SAFETYLOCK CARBON RIB-BACK BLADES

## (undated) DEVICE — QUICK CATCH IN-LINE SUCTION POLYP TRAP IS USED FOR SUCTION RETRIEVAL OF ENDOSCOPICALLY REMOVED POLYPS.

## (undated) DEVICE — GLOVE,SURG,SENSICARE,ALOE,LF,PF,6: Brand: MEDLINE

## (undated) DEVICE — DRSNG SURESITE WNDW 4X4.5

## (undated) DEVICE — TOWEL,OR,DSP,ST,BLUE,STD,4/PK,20PK/CS: Brand: MEDLINE

## (undated) DEVICE — TRAP FLD MINIVAC MEGADYNE 100ML

## (undated) DEVICE — ANTIBACTERIAL UNDYED BRAIDED (POLYGLACTIN 910), SYNTHETIC ABSORBABLE SUTURE: Brand: COATED VICRYL

## (undated) DEVICE — CONMED SCOPE SAVER BITE BLOCK, 20X27 MM: Brand: SCOPE SAVER

## (undated) DEVICE — FRCP BX RADJAW4 NDL 2.8 240 STD OG

## (undated) DEVICE — SUT ETHLN 0 LP XLH 72IN D5854

## (undated) DEVICE — MEDI-VAC NON-CONDUCTIVE SUCTION TUBING: Brand: CARDINAL HEALTH

## (undated) DEVICE — SUT ETHLN 3/0 PC5 18IN 1893G

## (undated) DEVICE — LUBE JELLY PK/2.75GM STRL BX/144

## (undated) DEVICE — SUT MNCRYL 5/0 P3 18IN UD MCP493G

## (undated) DEVICE — DRSNG GZ PETROLTM XEROFORM CURAD 1X8IN STRL

## (undated) DEVICE — SUCTION CANISTER, 1500CC, RIGID: Brand: DEROYAL

## (undated) DEVICE — CVR HNDL LIGHT RIGID

## (undated) DEVICE — SINGLE-USE POLYPECTOMY SNARE: Brand: CAPTIVATOR II

## (undated) DEVICE — PROXIMATE RH ROTATING HEAD SKIN STAPLERS (35 WIDE) CONTAINS 35 STAINLESS STEEL STAPLES: Brand: PROXIMATE

## (undated) DEVICE — DRSNG SURESITE WNDW 2.38X2.75

## (undated) DEVICE — BLANKT WARM UNDER/BDY FUL/ACC A/ 90X206CM

## (undated) DEVICE — 3M™ STERI-DRAPE™ INCISE DRAPE 1050 (60CM X 45CM): Brand: STERI-DRAPE™

## (undated) DEVICE — INTENDED USE FOR SURGICAL MARKING ON INTACT SKIN, ALSO PROVIDES A PERMANENT METHOD OF IDENTIFYING OBJECTS IN THE OPERATING ROOM: Brand: WRITESITE® REGULAR TIP SKIN MARKER

## (undated) DEVICE — TOTAL TRAY, 16FR 10ML SIL FOLEY, URN: Brand: MEDLINE

## (undated) DEVICE — TBG PENCL TELESCP MEGADYNE SMOKE EVAC 10FT

## (undated) DEVICE — BIOPATCH™ ANTIMICROBIAL DRESSING WITH CHLORHEXIDINE GLUCONATE IS A HYDROPHILLIC POLYURETHANE ABSORPTIVE FOAM WITH CHLORHEXIDINE GLUCONATE (CHG) WHICH INHIBITS BACTERIAL GROWTH UNDER THE DRESSING. THE DRESSING IS INTENDED TO BE USED TO ABSORB EXUDATE, COVER A WOUND CAUSED BY VASCULAR AND NONVASCULAR PERCUTANEOUS MEDICAL DEVICES DURING SURGERY, AS WELL AS REDUCE LOCAL INFECTION AND COLONIZATION OF MICROORGANISMS.: Brand: BIOPATCH

## (undated) DEVICE — ELECTRD BLD EZ CLN MOD XLNG 2.75IN

## (undated) DEVICE — HDRST INTUB GENTLETOUCH SLOT 7IN RT

## (undated) DEVICE — SUT ETHLN 4/0 PS2 18IN 1667H

## (undated) DEVICE — SUT MNCRYL PLS ANTIB UD 3/0 PS2 27IN

## (undated) DEVICE — SHEET, DRAPE, SPLIT, STERILE: Brand: MEDLINE

## (undated) DEVICE — PK CHST BRST 10

## (undated) DEVICE — PATIENT RETURN ELECTRODE, SINGLE-USE, CONTACT QUALITY MONITORING, ADULT, WITH 9FT CORD, FOR PATIENTS WEIGING OVER 33LBS. (15KG): Brand: MEGADYNE

## (undated) DEVICE — SUT GUT PLN FAST ABS 5/0 PC1 18IN 1915G

## (undated) DEVICE — DRSNG PAD ABD 8X10IN STRL

## (undated) DEVICE — GOWN,NON-REINFORCED,SIRUS,SET IN SLV,XL: Brand: MEDLINE

## (undated) DEVICE — BANDAGE,GAUZE,BULKEE II,4.5"X4.1YD,STRL: Brand: MEDLINE

## (undated) DEVICE — YANKAUER,BULB TIP, NO VENT: Brand: ARGYLE